# Patient Record
Sex: FEMALE | Race: WHITE | NOT HISPANIC OR LATINO | URBAN - METROPOLITAN AREA
[De-identification: names, ages, dates, MRNs, and addresses within clinical notes are randomized per-mention and may not be internally consistent; named-entity substitution may affect disease eponyms.]

---

## 2020-01-21 ENCOUNTER — INPATIENT (INPATIENT)
Facility: HOSPITAL | Age: 85
LOS: 4 days | Discharge: HOME | End: 2020-01-26
Attending: STUDENT IN AN ORGANIZED HEALTH CARE EDUCATION/TRAINING PROGRAM | Admitting: STUDENT IN AN ORGANIZED HEALTH CARE EDUCATION/TRAINING PROGRAM
Payer: COMMERCIAL

## 2020-01-21 VITALS
DIASTOLIC BLOOD PRESSURE: 67 MMHG | RESPIRATION RATE: 20 BRPM | OXYGEN SATURATION: 98 % | HEART RATE: 68 BPM | TEMPERATURE: 99 F | SYSTOLIC BLOOD PRESSURE: 122 MMHG

## 2020-01-21 DIAGNOSIS — Z29.9 ENCOUNTER FOR PROPHYLACTIC MEASURES, UNSPECIFIED: ICD-10-CM

## 2020-01-21 DIAGNOSIS — R41.82 ALTERED MENTAL STATUS, UNSPECIFIED: ICD-10-CM

## 2020-01-21 DIAGNOSIS — Z90.49 ACQUIRED ABSENCE OF OTHER SPECIFIED PARTS OF DIGESTIVE TRACT: Chronic | ICD-10-CM

## 2020-01-21 DIAGNOSIS — J18.9 PNEUMONIA, UNSPECIFIED ORGANISM: ICD-10-CM

## 2020-01-21 DIAGNOSIS — N30.90 CYSTITIS, UNSPECIFIED WITHOUT HEMATURIA: ICD-10-CM

## 2020-01-21 DIAGNOSIS — E87.1 HYPO-OSMOLALITY AND HYPONATREMIA: ICD-10-CM

## 2020-01-21 DIAGNOSIS — Z02.9 ENCOUNTER FOR ADMINISTRATIVE EXAMINATIONS, UNSPECIFIED: ICD-10-CM

## 2020-01-21 LAB
ALBUMIN SERPL ELPH-MCNC: 3.6 G/DL — SIGNIFICANT CHANGE UP (ref 3.3–5)
ALP SERPL-CCNC: 96 U/L — SIGNIFICANT CHANGE UP (ref 40–120)
ALT FLD-CCNC: 20 U/L — SIGNIFICANT CHANGE UP (ref 4–33)
ANION GAP SERPL CALC-SCNC: 13 MMO/L — SIGNIFICANT CHANGE UP (ref 7–14)
ANION GAP SERPL CALC-SCNC: 7 MMO/L — SIGNIFICANT CHANGE UP (ref 7–14)
APPEARANCE UR: CLEAR — SIGNIFICANT CHANGE UP
APTT BLD: 28.7 SEC — SIGNIFICANT CHANGE UP (ref 27.5–36.3)
AST SERPL-CCNC: 49 U/L — HIGH (ref 4–32)
B PERT DNA SPEC QL NAA+PROBE: NOT DETECTED — SIGNIFICANT CHANGE UP
BACTERIA # UR AUTO: SIGNIFICANT CHANGE UP
BASE EXCESS BLDV CALC-SCNC: 4.8 MMOL/L — SIGNIFICANT CHANGE UP
BASOPHILS # BLD AUTO: 0.03 K/UL — SIGNIFICANT CHANGE UP (ref 0–0.2)
BASOPHILS NFR BLD AUTO: 0.3 % — SIGNIFICANT CHANGE UP (ref 0–2)
BILIRUB SERPL-MCNC: 0.6 MG/DL — SIGNIFICANT CHANGE UP (ref 0.2–1.2)
BILIRUB UR-MCNC: NEGATIVE — SIGNIFICANT CHANGE UP
BLOOD GAS VENOUS - CREATININE: 0.62 MG/DL — SIGNIFICANT CHANGE UP (ref 0.5–1.3)
BLOOD UR QL VISUAL: NEGATIVE — SIGNIFICANT CHANGE UP
BUN SERPL-MCNC: 10 MG/DL — SIGNIFICANT CHANGE UP (ref 7–23)
BUN SERPL-MCNC: 11 MG/DL — SIGNIFICANT CHANGE UP (ref 7–23)
C PNEUM DNA SPEC QL NAA+PROBE: NOT DETECTED — SIGNIFICANT CHANGE UP
CALCIUM SERPL-MCNC: 8.8 MG/DL — SIGNIFICANT CHANGE UP (ref 8.4–10.5)
CALCIUM SERPL-MCNC: 9.2 MG/DL — SIGNIFICANT CHANGE UP (ref 8.4–10.5)
CHLORIDE BLDV-SCNC: 98 MMOL/L — SIGNIFICANT CHANGE UP (ref 96–108)
CHLORIDE SERPL-SCNC: 91 MMOL/L — LOW (ref 98–107)
CHLORIDE SERPL-SCNC: 91 MMOL/L — LOW (ref 98–107)
CO2 SERPL-SCNC: 23 MMOL/L — SIGNIFICANT CHANGE UP (ref 22–31)
CO2 SERPL-SCNC: 27 MMOL/L — SIGNIFICANT CHANGE UP (ref 22–31)
COLOR SPEC: YELLOW — SIGNIFICANT CHANGE UP
CREAT SERPL-MCNC: 0.58 MG/DL — SIGNIFICANT CHANGE UP (ref 0.5–1.3)
CREAT SERPL-MCNC: 0.6 MG/DL — SIGNIFICANT CHANGE UP (ref 0.5–1.3)
EOSINOPHIL # BLD AUTO: 0.04 K/UL — SIGNIFICANT CHANGE UP (ref 0–0.5)
EOSINOPHIL NFR BLD AUTO: 0.4 % — SIGNIFICANT CHANGE UP (ref 0–6)
FLUAV H1 2009 PAND RNA SPEC QL NAA+PROBE: NOT DETECTED — SIGNIFICANT CHANGE UP
FLUAV H1 RNA SPEC QL NAA+PROBE: NOT DETECTED — SIGNIFICANT CHANGE UP
FLUAV H3 RNA SPEC QL NAA+PROBE: NOT DETECTED — SIGNIFICANT CHANGE UP
FLUAV SUBTYP SPEC NAA+PROBE: NOT DETECTED — SIGNIFICANT CHANGE UP
FLUBV RNA SPEC QL NAA+PROBE: NOT DETECTED — SIGNIFICANT CHANGE UP
GAS PNL BLDV: 126 MMOL/L — LOW (ref 136–146)
GLUCOSE BLDV-MCNC: 97 MG/DL — SIGNIFICANT CHANGE UP (ref 70–99)
GLUCOSE SERPL-MCNC: 163 MG/DL — HIGH (ref 70–99)
GLUCOSE SERPL-MCNC: 96 MG/DL — SIGNIFICANT CHANGE UP (ref 70–99)
GLUCOSE UR-MCNC: NEGATIVE — SIGNIFICANT CHANGE UP
HADV DNA SPEC QL NAA+PROBE: NOT DETECTED — SIGNIFICANT CHANGE UP
HBA1C BLD-MCNC: 5.4 % — SIGNIFICANT CHANGE UP (ref 4–5.6)
HCO3 BLDV-SCNC: 28 MMOL/L — HIGH (ref 20–27)
HCOV PNL SPEC NAA+PROBE: SIGNIFICANT CHANGE UP
HCT VFR BLD CALC: 41.2 % — SIGNIFICANT CHANGE UP (ref 34.5–45)
HCT VFR BLDV CALC: 42.7 % — SIGNIFICANT CHANGE UP (ref 34.5–45)
HGB BLD-MCNC: 13.8 G/DL — SIGNIFICANT CHANGE UP (ref 11.5–15.5)
HGB BLDV-MCNC: 13.9 G/DL — SIGNIFICANT CHANGE UP (ref 11.5–15.5)
HMPV RNA SPEC QL NAA+PROBE: NOT DETECTED — SIGNIFICANT CHANGE UP
HPIV1 RNA SPEC QL NAA+PROBE: NOT DETECTED — SIGNIFICANT CHANGE UP
HPIV2 RNA SPEC QL NAA+PROBE: NOT DETECTED — SIGNIFICANT CHANGE UP
HPIV3 RNA SPEC QL NAA+PROBE: NOT DETECTED — SIGNIFICANT CHANGE UP
HPIV4 RNA SPEC QL NAA+PROBE: NOT DETECTED — SIGNIFICANT CHANGE UP
HYALINE CASTS # UR AUTO: SIGNIFICANT CHANGE UP
IMM GRANULOCYTES NFR BLD AUTO: 0.4 % — SIGNIFICANT CHANGE UP (ref 0–1.5)
INR BLD: 0.97 — SIGNIFICANT CHANGE UP (ref 0.88–1.17)
KETONES UR-MCNC: NEGATIVE — SIGNIFICANT CHANGE UP
LACTATE BLDV-MCNC: 1 MMOL/L — SIGNIFICANT CHANGE UP (ref 0.5–2)
LEUKOCYTE ESTERASE UR-ACNC: SIGNIFICANT CHANGE UP
LYMPHOCYTES # BLD AUTO: 1.37 K/UL — SIGNIFICANT CHANGE UP (ref 1–3.3)
LYMPHOCYTES # BLD AUTO: 12.5 % — LOW (ref 13–44)
MCHC RBC-ENTMCNC: 28.9 PG — SIGNIFICANT CHANGE UP (ref 27–34)
MCHC RBC-ENTMCNC: 33.5 % — SIGNIFICANT CHANGE UP (ref 32–36)
MCV RBC AUTO: 86.2 FL — SIGNIFICANT CHANGE UP (ref 80–100)
MONOCYTES # BLD AUTO: 0.71 K/UL — SIGNIFICANT CHANGE UP (ref 0–0.9)
MONOCYTES NFR BLD AUTO: 6.5 % — SIGNIFICANT CHANGE UP (ref 2–14)
NEUTROPHILS # BLD AUTO: 8.77 K/UL — HIGH (ref 1.8–7.4)
NEUTROPHILS NFR BLD AUTO: 79.9 % — HIGH (ref 43–77)
NITRITE UR-MCNC: NEGATIVE — SIGNIFICANT CHANGE UP
NRBC # FLD: 0 K/UL — SIGNIFICANT CHANGE UP (ref 0–0)
OSMOLALITY SERPL: 277 MOSMO/KG — SIGNIFICANT CHANGE UP (ref 275–295)
OSMOLALITY UR: 381 MOSMO/KG — SIGNIFICANT CHANGE UP (ref 50–1200)
PCO2 BLDV: 48 MMHG — SIGNIFICANT CHANGE UP (ref 41–51)
PH BLDV: 7.41 PH — SIGNIFICANT CHANGE UP (ref 7.32–7.43)
PH UR: 7 — SIGNIFICANT CHANGE UP (ref 5–8)
PLATELET # BLD AUTO: 247 K/UL — SIGNIFICANT CHANGE UP (ref 150–400)
PMV BLD: 10.5 FL — SIGNIFICANT CHANGE UP (ref 7–13)
PO2 BLDV: 52 MMHG — HIGH (ref 35–40)
POTASSIUM BLDV-SCNC: 4.3 MMOL/L — SIGNIFICANT CHANGE UP (ref 3.4–4.5)
POTASSIUM SERPL-MCNC: 4.4 MMOL/L — SIGNIFICANT CHANGE UP (ref 3.5–5.3)
POTASSIUM SERPL-MCNC: SIGNIFICANT CHANGE UP MMOL/L (ref 3.5–5.3)
POTASSIUM SERPL-SCNC: 4.4 MMOL/L — SIGNIFICANT CHANGE UP (ref 3.5–5.3)
POTASSIUM SERPL-SCNC: SIGNIFICANT CHANGE UP MMOL/L (ref 3.5–5.3)
PROT SERPL-MCNC: 7.4 G/DL — SIGNIFICANT CHANGE UP (ref 6–8.3)
PROT UR-MCNC: 70 — SIGNIFICANT CHANGE UP
PROTHROM AB SERPL-ACNC: 11.1 SEC — SIGNIFICANT CHANGE UP (ref 9.8–13.1)
RBC # BLD: 4.78 M/UL — SIGNIFICANT CHANGE UP (ref 3.8–5.2)
RBC # FLD: 14.7 % — HIGH (ref 10.3–14.5)
RBC CASTS # UR COMP ASSIST: SIGNIFICANT CHANGE UP (ref 0–?)
RSV RNA SPEC QL NAA+PROBE: NOT DETECTED — SIGNIFICANT CHANGE UP
RV+EV RNA SPEC QL NAA+PROBE: NOT DETECTED — SIGNIFICANT CHANGE UP
SAO2 % BLDV: 85.2 % — HIGH (ref 60–85)
SODIUM SERPL-SCNC: 125 MMOL/L — LOW (ref 135–145)
SODIUM SERPL-SCNC: 127 MMOL/L — LOW (ref 135–145)
SODIUM UR-SCNC: 34 MMOL/L — SIGNIFICANT CHANGE UP
SP GR SPEC: 1.04 — SIGNIFICANT CHANGE UP (ref 1–1.04)
SQUAMOUS # UR AUTO: SIGNIFICANT CHANGE UP
TSH SERPL-MCNC: 1.46 UIU/ML — SIGNIFICANT CHANGE UP (ref 0.27–4.2)
UROBILINOGEN FLD QL: NORMAL — SIGNIFICANT CHANGE UP
WBC # BLD: 10.96 K/UL — HIGH (ref 3.8–10.5)
WBC # FLD AUTO: 10.96 K/UL — HIGH (ref 3.8–10.5)
WBC UR QL: >50 — HIGH (ref 0–?)

## 2020-01-21 PROCEDURE — 99223 1ST HOSP IP/OBS HIGH 75: CPT | Mod: GC

## 2020-01-21 PROCEDURE — 71045 X-RAY EXAM CHEST 1 VIEW: CPT | Mod: 26

## 2020-01-21 PROCEDURE — 70496 CT ANGIOGRAPHY HEAD: CPT | Mod: 26

## 2020-01-21 PROCEDURE — 70498 CT ANGIOGRAPHY NECK: CPT | Mod: 26

## 2020-01-21 RX ORDER — IPRATROPIUM/ALBUTEROL SULFATE 18-103MCG
3 AEROSOL WITH ADAPTER (GRAM) INHALATION EVERY 4 HOURS
Refills: 0 | Status: DISCONTINUED | OUTPATIENT
Start: 2020-01-21 | End: 2020-01-26

## 2020-01-21 RX ORDER — SODIUM CHLORIDE 9 MG/ML
1000 INJECTION, SOLUTION INTRAVENOUS
Refills: 0 | Status: DISCONTINUED | OUTPATIENT
Start: 2020-01-21 | End: 2020-01-21

## 2020-01-21 RX ORDER — ACETAMINOPHEN 500 MG
650 TABLET ORAL EVERY 6 HOURS
Refills: 0 | Status: DISCONTINUED | OUTPATIENT
Start: 2020-01-21 | End: 2020-01-26

## 2020-01-21 RX ORDER — AZITHROMYCIN 500 MG/1
500 TABLET, FILM COATED ORAL ONCE
Refills: 0 | Status: COMPLETED | OUTPATIENT
Start: 2020-01-21 | End: 2020-01-21

## 2020-01-21 RX ORDER — SODIUM CHLORIDE 9 MG/ML
1000 INJECTION INTRAMUSCULAR; INTRAVENOUS; SUBCUTANEOUS ONCE
Refills: 0 | Status: COMPLETED | OUTPATIENT
Start: 2020-01-21 | End: 2020-01-21

## 2020-01-21 RX ORDER — SODIUM CHLORIDE 9 MG/ML
1000 INJECTION INTRAMUSCULAR; INTRAVENOUS; SUBCUTANEOUS
Refills: 0 | Status: DISCONTINUED | OUTPATIENT
Start: 2020-01-21 | End: 2020-01-22

## 2020-01-21 RX ORDER — CEFTRIAXONE 500 MG/1
1000 INJECTION, POWDER, FOR SOLUTION INTRAMUSCULAR; INTRAVENOUS ONCE
Refills: 0 | Status: COMPLETED | OUTPATIENT
Start: 2020-01-21 | End: 2020-01-21

## 2020-01-21 RX ORDER — HEPARIN SODIUM 5000 [USP'U]/ML
5000 INJECTION INTRAVENOUS; SUBCUTANEOUS EVERY 8 HOURS
Refills: 0 | Status: DISCONTINUED | OUTPATIENT
Start: 2020-01-21 | End: 2020-01-26

## 2020-01-21 RX ADMIN — SODIUM CHLORIDE 1000 MILLILITER(S): 9 INJECTION INTRAMUSCULAR; INTRAVENOUS; SUBCUTANEOUS at 17:41

## 2020-01-21 RX ADMIN — CEFTRIAXONE 100 MILLIGRAM(S): 500 INJECTION, POWDER, FOR SOLUTION INTRAMUSCULAR; INTRAVENOUS at 19:58

## 2020-01-21 RX ADMIN — AZITHROMYCIN 255 MILLIGRAM(S): 500 TABLET, FILM COATED ORAL at 20:06

## 2020-01-21 NOTE — ED PROVIDER NOTE - PROGRESS NOTE DETAILS
phone contact number for daughter in law (allison) and son (chris) as they are traveling out of state/ returning [facility]: [phone] Davey Arenas PGY2  Spoke to son Jarred (who is HCP), who confirms that she is DNR/DNI Davey Arenas PGY2  cxr shows pna, sodium 125. home aide endorses patient drinking a lot of water yesterday. admitted to medicine

## 2020-01-21 NOTE — H&P ADULT - PROBLEM/PLAN-2
Telephone Encounter by Cheryl Pacheco RN at 06/06/17 10:25 AM     Author:  Cheryl Pacheco RN Service:  (none) Author Type:  Registered Nurse     Filed:  06/06/17 10:32 AM Encounter Date:  6/2/2017 Status:  Addendum     :  Cheryl Pacheco RN (Registered Nurse)            Patient was seen in the ER on 06/01 and also saw Dr. Watson on 06/02/17.  Patient states, \"I'm still dizzy, nauseated and weak and on my last day of antibiotics.\"  \"I'm only feeling slightly better\"  Dr. Watson increased her Amlodipine 5 mg one in the am and one in the pm and Metoprolol 25 mg because my blood pressure was 179/92 in the ER.\"  Advised patient to keep her appointment today with Dr. Abel.[VP1.1M]    3-way repeat back was completed on the information provided by the patient for verification and accuracy. Patient was in agreement and denied further questions or concerns.[VP1.2T]      Revision History        User Key Date/Time User Provider Type Action    > [N/A] 06/06/17 10:32 AM Cheryl Pacheco RN Registered Nurse Addend     VP1.2 06/06/17 10:31 AM Cheryl Pacheco RN Registered Nurse Sign     VP1.1 06/06/17 10:25 AM Cheryl Pacheco RN Registered Nurse     M - Manual, T - Template             DISPLAY PLAN FREE TEXT

## 2020-01-21 NOTE — H&P ADULT - NSHPLABSRESULTS_GEN_ALL_CORE
13.8   10.96 )-----------( 247      ( 2020 16:46 )             41.2         125<L>  |  91<L>  |  11  ----------------------------<  96  Test not performed SPECIMEN SEVERELY HEMOLYZED   |  27  |  0.58    Ca    9.2      2020 16:46    TPro  7.4  /  Alb  3.6  /  TBili  0.6  /  DBili  x   /  AST  49<H>  /  ALT  20  /  AlkPhos  96      PT/INR - ( 2020 16:46 )   PT: 11.1 SEC;   INR: 0.97          PTT - ( 2020 16:46 )  PTT:28.7 SEC      Urinalysis Basic - ( 2020 17:36 )    Color: YELLOW / Appearance: CLEAR / S.040 / pH: 7.0  Gluc: NEGATIVE / Ketone: NEGATIVE  / Bili: NEGATIVE / Urobili: NORMAL   Blood: NEGATIVE / Protein: 70 / Nitrite: NEGATIVE   Leuk Esterase: LARGE / RBC: 0-2 / WBC >50   Sq Epi: OCC / Non Sq Epi: x / Bacteria: FEW    < from: CT Angio Neck w/ IV Cont (20 @ 17:20) >      COMPARISON:  Brain CT 3/23/2016    FINDINGS: The exam is significantly degraded by motion artifact.  Neck CTA:    The visualized aorta and great vessels are unremarkable. The common carotid arteries appear patent.   The internal carotid arteries and external carotid arteries appear patent.   The visualized portions of the vertebral arteries are patent.    Brain CTA:    The distal internal carotid arteries are not completely visualized  The [place] of Blood is notwell seen.   The cerebral arteries are not well seen.   The vertebrobasilar junction and basilar artery appear patent.    All measurements are performed using standard NASCET criteria.    CT of the head demonstrates the ventricles and sulci to be prominent in size compatible with age-appropriate volume loss. Patchy areas of white matter hypodensity are compatible with moderate microvascular-type changes. No mass effect or hemorrhage is seen. No extra-axial collection is seen.    IMPRESSION: Limited exam due to motion artifact.    No significant stenosis of the extracranial carotid arteries or vertebral arteries identified.    Evaluation of the intracranial circulation is significantly limited.    < end of copied text >    < from: Xray Chest 1 View- PORTABLE-Urgent (20 @ 16:52) >    INTERPRETATION:  Hazyright mid/upper lung opacity compatible with pneumonia    < end of copied text >          RADIOLOGY & ADDITIONAL TESTS:  Results Reviewed:   Imaging Personally Reviewed:  Electrocardiogram Personally Reviewed:    COORDINATION OF CARE:  Care Discussed with Consultants/Other Providers [Y/N]:  Prior or Outpatient Records Reviewed [Y/N]: 13.8   10.96 )-----------( 247      ( 2020 16:46 )             41.2         125<L>  |  91<L>  |  11  ----------------------------<  96  Test not performed SPECIMEN SEVERELY HEMOLYZED   |  27  |  0.58    Ca    9.2      2020 16:46    TPro  7.4  /  Alb  3.6  /  TBili  0.6  /  DBili  x   /  AST  49<H>  /  ALT  20  /  AlkPhos  96      PT/INR - ( 2020 16:46 )   PT: 11.1 SEC;   INR: 0.97          PTT - ( 2020 16:46 )  PTT:28.7 SEC      Urinalysis Basic - ( 2020 17:36 )    Color: YELLOW / Appearance: CLEAR / S.040 / pH: 7.0  Gluc: NEGATIVE / Ketone: NEGATIVE  / Bili: NEGATIVE / Urobili: NORMAL   Blood: NEGATIVE / Protein: 70 / Nitrite: NEGATIVE   Leuk Esterase: LARGE / RBC: 0-2 / WBC >50   Sq Epi: OCC / Non Sq Epi: x / Bacteria: FEW    < from: CT Angio Neck w/ IV Cont (20 @ 17:20) >      COMPARISON:  Brain CT 3/23/2016    FINDINGS: The exam is significantly degraded by motion artifact.  Neck CTA:    The visualized aorta and great vessels are unremarkable. The common carotid arteries appear patent.   The internal carotid arteries and external carotid arteries appear patent.   The visualized portions of the vertebral arteries are patent.    Brain CTA:    The distal internal carotid arteries are not completely visualized  The [place] of Blood is notwell seen.   The cerebral arteries are not well seen.   The vertebrobasilar junction and basilar artery appear patent.    All measurements are performed using standard NASCET criteria.    CT of the head demonstrates the ventricles and sulci to be prominent in size compatible with age-appropriate volume loss. Patchy areas of white matter hypodensity are compatible with moderate microvascular-type changes. No mass effect or hemorrhage is seen. No extra-axial collection is seen.    IMPRESSION: Limited exam due to motion artifact.    No significant stenosis of the extracranial carotid arteries or vertebral arteries identified.    Evaluation of the intracranial circulation is significantly limited.    < end of copied text >    < from: Xray Chest 1 View- PORTABLE-Urgent (20 @ 16:52) >    INTERPRETATION:  Hazyright mid/upper lung opacity compatible with pneumonia    < end of copied text >          RADIOLOGY & ADDITIONAL TESTS:  Results Reviewed: yes  Imaging Personally Reviewed: yes   Electrocardiogram Personally Reviewed: yes; normal EKG, normal rate, normal intervals, no SHELLIE/STD or TWI     COORDINATION OF CARE:  Care Discussed with Consultants/Other Providers [ Y/N]:  Prior or Outpatient Records Reviewed [Y/N]: Y; no PCP

## 2020-01-21 NOTE — ED PROVIDER NOTE - PHYSICAL EXAMINATION
Get adequate rest  Increase fluid intake  Gargle with warm water and salt solution several times a day as needed for sore throat or postnasal drainage  May take Children's Tylenol every 6 hours as needed for pain or fever           VS:  unremarkable except LG temp, tachypnea    GEN - malaise, confused, slurred speech.  No resp distress  HEAD - NC/AT     ENT - PEERL, EOMI, mucous membranes   dry, no discharge      NECK: Neck supple, non-tender without lymphadenopathy, no masses, no JVD  PULM - CTA b/l,  symmetric breath sounds  COR -  normal heart sounds    ABD - , obese, NT, soft,  BACK - no CVA tenderness, nontender spine     EXTREMS - no edema, no deformity, warm and well perfused.  bilat calves atrophic   SKIN - no rash    or bruising    Mild redness bilat lower legs, no tenderness (h/o recurrent cellulitis)  NEUROLOGIC - alert, face symmetric, speech slurred, sensation nl, motor bilat UE normal strength, bilat LE can move toes, can not lift either leg off bed (chronic to some degree but worse).

## 2020-01-21 NOTE — H&P ADULT - NSHPSOCIALHISTORY_GEN_ALL_CORE
never smoke, drank or did illicit drugs  wheelchair bound after a fall 4 years ago  Lives w/ son and his family and has a HHA   Son Jarred is the HCP   DNR/DNI

## 2020-01-21 NOTE — ED PROVIDER NOTE - ATTENDING CONTRIBUTION TO CARE
89F pmhx none except wheelchair bound at baseline, AMS x 1 day, usu AAO x 3, today unable to make words, unable to interact.  Able to move arms and legs, nonsensical; no fever at home or cough; Family out of country, no collateral availabe, pt with an aide, opening eyes.  Legs appear atrophic distally c/w nonweight bearing, no edema/rash, throat looks OK.  LG temp here check Rectal temp, check FS.  Eval for ICH/stroke/mass, rx fluids, check str cath UA/Cx; afebrile rectally, concern for stroke given dysarthria and aphasia.  Moving both arms, both legs don't move well but this is chronic they don't appear infected (chronic problem).  Plan check labs, CT, neuro consult, admit 89F pmhx none except wheelchair bound at baseline, AMS x 1 day, usu AAO x 3, today unable to make words, unable to interact.  Able to move arms and legs, nonsensical; no fever at home or cough; Family out of country, no collateral availabe, pt with an aide, opening eyes.  Legs appear atrophic distally c/w nonweight bearing, no edema/rash, throat looks OK.  LG temp here check Rectal temp, check FS.  Eval for ICH/stroke/mass, rx fluids, check str cath UA/Cx; afebrile rectally, concern for stroke given dysarthria and aphasia.  Moving both arms, both legs don't move well but this is chronic they don't appear infected (chronic problem).  Plan check labs, CT, neuro consult, admit.  VS:  unremarkable except LG temp, tachypnea    GEN - malaise, confused, slurred speech.  No resp distress  HEAD - NC/AT     ENT - PEERL, EOMI, mucous membranes   dry, no discharge      NECK: Neck supple, non-tender without lymphadenopathy, no masses, no JVD  PULM - CTA b/l,  symmetric breath sounds  COR -  normal heart sounds    ABD - , obese, NT, soft,  BACK - no CVA tenderness, nontender spine     EXTREMS - no edema, no deformity, warm and well perfused.  bilat calves atrophic   SKIN - no rash    or bruising    Mild redness bilat lower legs, no tenderness (h/o recurrent cellulitis)  NEUROLOGIC - alert, face symmetric, speech slurred, sensation nl, motor bilat UE normal strength, bilat LE can move toes, can not lift either leg off bed (chronic to some degree but worse).

## 2020-01-21 NOTE — H&P ADULT - NSHPPHYSICALEXAM_GEN_ALL_CORE
Vital Signs Last 24 Hrs  T(C): 37.3 (21 Jan 2020 15:30), Max: 37.3 (21 Jan 2020 13:53)  T(F): 99.1 (21 Jan 2020 15:30), Max: 99.1 (21 Jan 2020 13:53)  HR: 72 (21 Jan 2020 15:30) (68 - 72)  BP: 131/66 (21 Jan 2020 15:30) (122/67 - 131/66)  BP(mean): --  RR: 20 (21 Jan 2020 15:30) (20 - 20)  SpO2: 98% (21 Jan 2020 15:30) (98% - 98%)    PHYSICAL EXAM:  GENERAL: NAD, well-groomed, well-developed  HEAD:  Atraumatic, Normocephalic  EYES: EOMI, PERRLA, conjunctiva and sclera clear  ENMT: No tonsillar erythema, exudates, or enlargement; Moist mucous membranes, Good dentition, No lesions  NECK: Supple, No JVD, Normal thyroid  CHEST/LUNG: Clear to ausculation bilaterally; No rales, rhonchi, wheezing, or rubs  HEART: Regular rate and rhythm; No murmurs, rubs, or gallops  ABDOMEN: Soft, Nontender, Nondistended; Bowel sounds present  EXTREMITIES:  2+ Peripheral Pulses, No clubbing, cyanosis, or edema  LYMPH: No lymphadenopathy noted  SKIN: No rashes or lesions  NERVOUS SYSTEM:  Alert & Oriented X3, Good concentration; Motor Strength 5/5 B/L upper and lower extremities; DTRs 2+ intact and symmetric Vital Signs Last 24 Hrs  T(C): 37.3 (21 Jan 2020 15:30), Max: 37.3 (21 Jan 2020 13:53)  T(F): 99.1 (21 Jan 2020 15:30), Max: 99.1 (21 Jan 2020 13:53)  HR: 72 (21 Jan 2020 15:30) (68 - 72)  BP: 131/66 (21 Jan 2020 15:30) (122/67 - 131/66)  BP(mean): --  RR: 20 (21 Jan 2020 15:30) (20 - 20)  SpO2: 98% (21 Jan 2020 15:30) (98% - 98%)    PHYSICAL EXAM:  GENERAL: altered and moving thrashing around   HEAD:  Atraumatic, Normocephalic  EYES: EOMI, PERRLA, conjunctiva and sclera clear  ENMT: had venti-mask on   CHEST/LUNG: Clear to ausculation anteriorly; no rhonchi or wheezing  HEART: Regular rate and rhythm; No murmurs, rubs, or gallops  ABDOMEN: Soft, possible ventral hernia?; no bowel sounds present  EXTREMITIES:  2+ Peripheral Pulses, No clubbing, cyanosis, or edema.  Atrophied lower legs   LYMPH: unable to have patient sit still for exam   SKIN: No rashes or lesions  NERVOUS SYSTEM:  Alert & Oriented X 0; unable to participate in neuro exam

## 2020-01-21 NOTE — H&P ADULT - PROBLEM SELECTOR PLAN 6
Transitions of Care Status:  1.  Name of PCP: no PCP  2.  PCP Contacted on Admission: [ ] Y    [ x] N    3.  PCP contacted at Discharge: [ ] Y    [ ] N    [ ] N/A  4.  Post-Discharge Appointment Date and Location:  5.  Summary of Handoff given to PCP: Transitions of Care Status:  1.  Name of PCP: no PCP  2.  PCP Contacted on Admission: [ ] Y    [ x] N    3.  PCP contacted at Discharge: [ ] Y    [ ] N    [ ] N/A  4.  Post-Discharge Appointment Date and Location:  5.  Summary of Handoff given to PCP:      Dia Rich MD  Internal Medicine, PGY-2  [phone]

## 2020-01-21 NOTE — ED PROVIDER NOTE - OBJECTIVE STATEMENT
89 yo female with unremarkable pmhx presenting with mental status changes over the past day. Per home aide, patient was noted to be communicating less since this morning, not fully responding verbally to commands, (her baseline is AOx3). Aide and family states that this is the first occurrence of these symptoms. Brought to ED for further evaluation. Patient is wheelchair bound at baseline.    Aide denies fevers, sick contacts, recent travel, cough, N/V      phone contact number for daughter in law (allison) and son (jarred) as they are traveling out of state/ returning tonight: [phone]  Son Jarred the HCP confirms patient is DNR DNI

## 2020-01-21 NOTE — H&P ADULT - PROBLEM SELECTOR PLAN 4
- likely in setting of poor PO intake   - received 1L NS bolus on arrival   - f/u repeat BMP  - f/u hyponatremia studies

## 2020-01-21 NOTE — H&P ADULT - PROBLEM SELECTOR PLAN 5
- DVT ppx: subq heparin  - Diet: NPO until mental status clears w/ maintenance fluids at 75cc/hr - DVT ppx: subq heparin  - Diet: NPO until mental status clears.  Ordered official S&S consult.

## 2020-01-21 NOTE — H&P ADULT - HISTORY OF PRESENT ILLNESS
89 yo F who hasn't seen a doctor in years and is on only baby ASA at the recommendation of her daughter in law is presenting from home w/ AMS x 1 day.  At baseline pt is wheelchair bound and AAO x 3; 4 years ago she had a fall and has been wheelchair bound since and has been living w/ her son and his family.  They have a HHA that noticed that pt was communicating less since this morning and not fully responding verbally to commands. Aide and family states that this is the first occurrence of these symptoms. Brought to ED for further evaluation.

## 2020-01-21 NOTE — ED ADULT NURSE NOTE - OBJECTIVE STATEMENT
Break Shift RN: Pt received to spot 20 presents with altered mental status that began last night. Pt a&ox3 at baseline, however currently is a&ox0. Pt skin intact, respirations even and unlabored, abd soft and non-distended, nontender to palpation, pt bedbound at baseline. Pt aide at bedside acting as historian. Pt arrives with 20g in rt arm placed by EMS. 2nd IV placed in left arm, labs drawn and sent, pt currently at CT scan. Will continue to monitor.

## 2020-01-21 NOTE — H&P ADULT - NSHPREVIEWOFSYSTEMS_GEN_ALL_CORE
CONSTITUTIONAL: No weakness, fevers or chills  EYES: No visual changes; No blurry vision  ENT:  No pain or stiffness; No vertigo or throat pain  RESPIRATORY: No cough, wheezing, hemoptysis; No shortness of breath  CARDIOVASCULAR: No chest pain or palpitations  GASTROINTESTINAL: No abdominal or epigastric pain. No nausea, vomiting, or hematemesis; No diarrhea or constipation. No melena or hematochezia.  GENITOURINARY: No dysuria, frequency or hematuria  NEUROLOGICAL: No numbness, No HA  SKIN: No itching, rashes  MSK: no joint pain, no muscle pain unable to obtain 2/2 mental status

## 2020-01-21 NOTE — H&P ADULT - PROBLEM SELECTOR PLAN 1
- likely 2/2 metabolic encephalopathy from infectious etiology; CXR w/ hazy RUL and RML opacity and U/A positive for UTI.  F/U urine legionella, urine culture and blood cultures; if possible can try to obtain a sputum culture when pt more stable   - f/u TSH, b12, folate, RPR screen   - CTA head and neck showed patent vessels but was limited from motion artifact

## 2020-01-21 NOTE — ED PROVIDER NOTE - CARE PLAN
Principal Discharge DX:	Altered mental status  Secondary Diagnosis:	Pneumonia  Secondary Diagnosis:	Hyponatremia

## 2020-01-21 NOTE — H&P ADULT - PROBLEM SELECTOR PLAN 2
- CXR w/ RUL and RML opacity likely a PNA.  No hospitalizations in the last 3 months  - c/w 500 IV azithro x 3 days; can d/c if urine legionella is negative  - c/w ceftriaxone x 5 - 7 days   - RVP negative

## 2020-01-21 NOTE — ED PROVIDER NOTE - CLINICAL SUMMARY MEDICAL DECISION MAKING FREE TEXT BOX
89 yo female with unremarkable pmhx presenting with AMS. will evaluate for neuro, infectious, metabolic etiology with cbc cmp urine studies, ct head, cxr, will give ivf and will reassess. likely admission for mental status changes

## 2020-01-21 NOTE — ED ADULT NURSE NOTE - NSIMPLEMENTINTERV_GEN_ALL_ED
Implemented All Fall Risk Interventions:  [place] to call system. Call bell, personal items and telephone within reach. Instruct patient to call for assistance. Room bathroom lighting operational. Non-slip footwear when patient is off stretcher. Physically safe environment: no spills, clutter or unnecessary equipment. Stretcher in lowest position, wheels locked, appropriate side rails in place. Provide visual cue, wrist band, yellow gown, etc. Monitor gait and stability. Monitor for mental status changes and reorient to person, place, and time. Review medications for side effects contributing to fall risk. Reinforce activity limits and safety measures with patient and family.

## 2020-01-21 NOTE — H&P ADULT - ASSESSMENT
89 yo F who hasn't seen a doctor in years and is on only baby ASA at the recommendation of her daughter in law presenting w/ AMS 2/2 metabolic encephalopathy likely from CAP and UTI

## 2020-01-21 NOTE — ED ADULT TRIAGE NOTE - CHIEF COMPLAINT QUOTE
Pt A+OX0 since last night.  Pt bedbound, normally A+OX3.  Refusing temp to EMS.  O2 2L NC in use.  O2 sat 92% RA.  #20g R FA.  Pt not following directions

## 2020-01-21 NOTE — H&P ADULT - ATTENDING COMMENTS
89 y/o female, NO PCP, on ASA at home for primary prevention, HX of Fall 4 years ago, wheelchair Bound, pt is A+O x 3 at baseline, pt lives with family and HHA, pt is admitted for Altered mental status x one day, No Fever, + UA, R/O UTI, Chest X ray possible Pneumonia, Hyponatremia , Na 125 improved to 127 with IVF NS x one Lit.  given by ER, RVP Neg., on O2 NC 2 Lit., TSH 1.46, pt is DNR/DNI, Lethargic , + Dehydration, NPO, DVT Prophylaxis: SQ Heparin , IV Zithromax, IV Ceftriaxone  Cultures, Vit B12, Folate, , F/U CBC, CMP, PT, PTT, INR, Cultures, Vit B12, Folate, Ferritin, Iron Studies, Speech and Swallow consult, Fall/aspiration precaution, IVF NS @ 75 cc/hr x 10 HR, FS Q 6 HR    , HgbA1c,   FS Q  6 HR,  IVF NS @ 75 cc/hr x 10  HR, Duoneb Q 4 HR PRN wheezing,     Case D/W HS, and Nursing,     Pt was seen by me    , Dr. JUANCARLOS Osorio on 1/21/2020. 89 y/o female, NO PCP, on ASA at home for primary prevention, HX of Fall 4 years ago, wheelchair Bound, pt is A+O x 3 at baseline, pt lives with family and HHA, pt is admitted for Altered mental status x one day, No Fever, + UA, R/O UTI, Chest X ray possible Pneumonia, Hyponatremia , Na 125 improved to 127 with IVF NS x one Lit.  given by ER, RVP Neg., on O2 NC 2 Lit., TSH 1.46, pt is DNR/DNI, Lethargic , + Dehydration, NPO, DVT Prophylaxis: SQ Heparin , IV Zithromax, IV Ceftriaxone  , F/U CBC, CMP, PT, PTT, INR, Cultures, Vit B12, Folate, Ferritin, Iron Studies, Speech and Swallow consult, Fall/aspiration precaution, IVF NS @ 75 cc/hr x 10 HR, FS Q 6 HR    , HgbA1c,   Duoneb Q 4 HR PRN wheezing,     Case D/W HS, and Nursing,     Pt was seen by me , Dr. JUANCARLOS Osorio on 1/21/2020.

## 2020-01-22 LAB
ALBUMIN SERPL ELPH-MCNC: 3 G/DL — LOW (ref 3.3–5)
ALP SERPL-CCNC: 90 U/L — SIGNIFICANT CHANGE UP (ref 40–120)
ALT FLD-CCNC: 14 U/L — SIGNIFICANT CHANGE UP (ref 4–33)
ANION GAP SERPL CALC-SCNC: 11 MMO/L — SIGNIFICANT CHANGE UP (ref 7–14)
AST SERPL-CCNC: 21 U/L — SIGNIFICANT CHANGE UP (ref 4–32)
BASOPHILS # BLD AUTO: 0.06 K/UL — SIGNIFICANT CHANGE UP (ref 0–0.2)
BASOPHILS NFR BLD AUTO: 0.8 % — SIGNIFICANT CHANGE UP (ref 0–2)
BILIRUB SERPL-MCNC: 0.5 MG/DL — SIGNIFICANT CHANGE UP (ref 0.2–1.2)
BUN SERPL-MCNC: 10 MG/DL — SIGNIFICANT CHANGE UP (ref 7–23)
CALCIUM SERPL-MCNC: 7.4 MG/DL — LOW (ref 8.4–10.5)
CHLORIDE SERPL-SCNC: 97 MMOL/L — LOW (ref 98–107)
CO2 SERPL-SCNC: 24 MMOL/L — SIGNIFICANT CHANGE UP (ref 22–31)
CREAT SERPL-MCNC: 0.71 MG/DL — SIGNIFICANT CHANGE UP (ref 0.5–1.3)
EOSINOPHIL # BLD AUTO: 0.12 K/UL — SIGNIFICANT CHANGE UP (ref 0–0.5)
EOSINOPHIL NFR BLD AUTO: 1.5 % — SIGNIFICANT CHANGE UP (ref 0–6)
FERRITIN SERPL-MCNC: 142 NG/ML — SIGNIFICANT CHANGE UP (ref 15–150)
GLUCOSE SERPL-MCNC: 84 MG/DL — SIGNIFICANT CHANGE UP (ref 70–99)
HCT VFR BLD CALC: 38.6 % — SIGNIFICANT CHANGE UP (ref 34.5–45)
HGB BLD-MCNC: 12.5 G/DL — SIGNIFICANT CHANGE UP (ref 11.5–15.5)
IMM GRANULOCYTES NFR BLD AUTO: 0.4 % — SIGNIFICANT CHANGE UP (ref 0–1.5)
IRON SATN MFR SERPL: 257 UG/DL — SIGNIFICANT CHANGE UP (ref 140–530)
IRON SATN MFR SERPL: 39 UG/DL — SIGNIFICANT CHANGE UP (ref 30–160)
L PNEUMO AG UR QL: NEGATIVE — SIGNIFICANT CHANGE UP
LYMPHOCYTES # BLD AUTO: 1.77 K/UL — SIGNIFICANT CHANGE UP (ref 1–3.3)
LYMPHOCYTES # BLD AUTO: 22.2 % — SIGNIFICANT CHANGE UP (ref 13–44)
MAGNESIUM SERPL-MCNC: 1.7 MG/DL — SIGNIFICANT CHANGE UP (ref 1.6–2.6)
MCHC RBC-ENTMCNC: 28.7 PG — SIGNIFICANT CHANGE UP (ref 27–34)
MCHC RBC-ENTMCNC: 32.4 % — SIGNIFICANT CHANGE UP (ref 32–36)
MCV RBC AUTO: 88.5 FL — SIGNIFICANT CHANGE UP (ref 80–100)
MONOCYTES # BLD AUTO: 0.84 K/UL — SIGNIFICANT CHANGE UP (ref 0–0.9)
MONOCYTES NFR BLD AUTO: 10.6 % — SIGNIFICANT CHANGE UP (ref 2–14)
NEUTROPHILS # BLD AUTO: 5.14 K/UL — SIGNIFICANT CHANGE UP (ref 1.8–7.4)
NEUTROPHILS NFR BLD AUTO: 64.5 % — SIGNIFICANT CHANGE UP (ref 43–77)
NRBC # FLD: 0 K/UL — SIGNIFICANT CHANGE UP (ref 0–0)
PHOSPHATE SERPL-MCNC: 4 MG/DL — SIGNIFICANT CHANGE UP (ref 2.5–4.5)
PLATELET # BLD AUTO: 202 K/UL — SIGNIFICANT CHANGE UP (ref 150–400)
PMV BLD: 10.4 FL — SIGNIFICANT CHANGE UP (ref 7–13)
POTASSIUM SERPL-MCNC: 4.6 MMOL/L — SIGNIFICANT CHANGE UP (ref 3.5–5.3)
POTASSIUM SERPL-SCNC: 4.6 MMOL/L — SIGNIFICANT CHANGE UP (ref 3.5–5.3)
PROT SERPL-MCNC: 6.1 G/DL — SIGNIFICANT CHANGE UP (ref 6–8.3)
RBC # BLD: 4.36 M/UL — SIGNIFICANT CHANGE UP (ref 3.8–5.2)
RBC # FLD: 15 % — HIGH (ref 10.3–14.5)
SODIUM SERPL-SCNC: 132 MMOL/L — LOW (ref 135–145)
SPECIMEN SOURCE: SIGNIFICANT CHANGE UP
UIBC SERPL-MCNC: 217.8 UG/DL — SIGNIFICANT CHANGE UP (ref 110–370)
WBC # BLD: 7.96 K/UL — SIGNIFICANT CHANGE UP (ref 3.8–10.5)
WBC # FLD AUTO: 7.96 K/UL — SIGNIFICANT CHANGE UP (ref 3.8–10.5)

## 2020-01-22 PROCEDURE — 99233 SBSQ HOSP IP/OBS HIGH 50: CPT | Mod: GC

## 2020-01-22 RX ORDER — SODIUM CHLORIDE 9 MG/ML
1000 INJECTION, SOLUTION INTRAVENOUS
Refills: 0 | Status: DISCONTINUED | OUTPATIENT
Start: 2020-01-22 | End: 2020-01-23

## 2020-01-22 RX ORDER — SODIUM CHLORIDE 9 MG/ML
1000 INJECTION INTRAMUSCULAR; INTRAVENOUS; SUBCUTANEOUS
Refills: 0 | Status: DISCONTINUED | OUTPATIENT
Start: 2020-01-22 | End: 2020-01-22

## 2020-01-22 RX ORDER — DEXTROSE 50 % IN WATER 50 %
12.5 SYRINGE (ML) INTRAVENOUS ONCE
Refills: 0 | Status: DISCONTINUED | OUTPATIENT
Start: 2020-01-22 | End: 2020-01-24

## 2020-01-22 RX ORDER — SODIUM CHLORIDE 9 MG/ML
1000 INJECTION, SOLUTION INTRAVENOUS
Refills: 0 | Status: DISCONTINUED | OUTPATIENT
Start: 2020-01-22 | End: 2020-01-24

## 2020-01-22 RX ORDER — CEFTRIAXONE 500 MG/1
1000 INJECTION, POWDER, FOR SOLUTION INTRAMUSCULAR; INTRAVENOUS EVERY 24 HOURS
Refills: 0 | Status: DISCONTINUED | OUTPATIENT
Start: 2020-01-22 | End: 2020-01-26

## 2020-01-22 RX ORDER — SODIUM CHLORIDE 9 MG/ML
1000 INJECTION, SOLUTION INTRAVENOUS
Refills: 0 | Status: DISCONTINUED | OUTPATIENT
Start: 2020-01-22 | End: 2020-01-22

## 2020-01-22 RX ORDER — GLUCAGON INJECTION, SOLUTION 0.5 MG/.1ML
1 INJECTION, SOLUTION SUBCUTANEOUS ONCE
Refills: 0 | Status: DISCONTINUED | OUTPATIENT
Start: 2020-01-22 | End: 2020-01-24

## 2020-01-22 RX ORDER — DEXTROSE 50 % IN WATER 50 %
50 SYRINGE (ML) INTRAVENOUS ONCE
Refills: 0 | Status: DISCONTINUED | OUTPATIENT
Start: 2020-01-22 | End: 2020-01-22

## 2020-01-22 RX ORDER — DEXTROSE 50 % IN WATER 50 %
15 SYRINGE (ML) INTRAVENOUS ONCE
Refills: 0 | Status: DISCONTINUED | OUTPATIENT
Start: 2020-01-22 | End: 2020-01-24

## 2020-01-22 RX ORDER — DEXTROSE 50 % IN WATER 50 %
25 SYRINGE (ML) INTRAVENOUS ONCE
Refills: 0 | Status: DISCONTINUED | OUTPATIENT
Start: 2020-01-22 | End: 2020-01-24

## 2020-01-22 RX ORDER — AZITHROMYCIN 500 MG/1
500 TABLET, FILM COATED ORAL EVERY 24 HOURS
Refills: 0 | Status: DISCONTINUED | OUTPATIENT
Start: 2020-01-22 | End: 2020-01-23

## 2020-01-22 RX ADMIN — SODIUM CHLORIDE 75 MILLILITER(S): 9 INJECTION INTRAMUSCULAR; INTRAVENOUS; SUBCUTANEOUS at 00:59

## 2020-01-22 RX ADMIN — HEPARIN SODIUM 5000 UNIT(S): 5000 INJECTION INTRAVENOUS; SUBCUTANEOUS at 06:44

## 2020-01-22 RX ADMIN — HEPARIN SODIUM 5000 UNIT(S): 5000 INJECTION INTRAVENOUS; SUBCUTANEOUS at 00:45

## 2020-01-22 RX ADMIN — HEPARIN SODIUM 5000 UNIT(S): 5000 INJECTION INTRAVENOUS; SUBCUTANEOUS at 13:18

## 2020-01-22 RX ADMIN — AZITHROMYCIN 255 MILLIGRAM(S): 500 TABLET, FILM COATED ORAL at 19:04

## 2020-01-22 RX ADMIN — CEFTRIAXONE 100 MILLIGRAM(S): 500 INJECTION, POWDER, FOR SOLUTION INTRAMUSCULAR; INTRAVENOUS at 19:03

## 2020-01-22 RX ADMIN — SODIUM CHLORIDE 75 MILLILITER(S): 9 INJECTION, SOLUTION INTRAVENOUS at 11:52

## 2020-01-22 RX ADMIN — HEPARIN SODIUM 5000 UNIT(S): 5000 INJECTION INTRAVENOUS; SUBCUTANEOUS at 21:47

## 2020-01-22 NOTE — PROGRESS NOTE ADULT - PROBLEM SELECTOR PLAN 6
Transitions of Care Status:  1.  Name of PCP: no PCP  2.  PCP Contacted on Admission: [ ] Y    [ x] N    3.  PCP contacted at Discharge: [ ] Y    [ ] N    [ ] N/A  4.  Post-Discharge Appointment Date and Location:  5.  Summary of Handoff given to PCP:

## 2020-01-22 NOTE — PROGRESS NOTE ADULT - ATTENDING COMMENTS
89 y/o female, NO PCP, on ASA at home for primary prevention (no clear indication), HX of Fall 4 years ago, wheelchair Bound, pt is A+O x 3 at baseline, lives with family and HHA, admitted for metabolic encephalopathy 2/2 CAP, ? UTI, and hyponatremia   Not responding appropriately on exam, baseline A&Ox4  Will consult ST when patient's mental status improves, for now resume IV D5NS  DNR/DNI, will need GOC  Continue IV Ceftriaxone and azithromycin, urine cx proteus 50-99K CFU, blood cx and legionella pending  Fall/aspiration precaution, PT consulted

## 2020-01-22 NOTE — PROGRESS NOTE ADULT - PROBLEM SELECTOR PLAN 1
- likely 2/2 metabolic encephalopathy from infectious etiology; CXR w/ hazy RUL and RML opacity and U/A positive for UTI  - CTA head and neck showed patent vessels but was limited from motion artifact  - f/u TSH, b12, folate, RPR screen   - f/u blood cx   - continue to monitor mental status

## 2020-01-22 NOTE — PROGRESS NOTE ADULT - PROBLEM SELECTOR PLAN 2
- CXR w/ RUL and RML opacity likely a PNA.  No hospitalizations in the last 3 months  - RVP negative   - c/w 500 IV azithro x 3 days; can d/c if urine legionella is negative  - c/w ceftriaxone x 5 - 7 days

## 2020-01-22 NOTE — PROGRESS NOTE ADULT - SUBJECTIVE AND OBJECTIVE BOX
Donna mSith MD   [facility] Medicine  Pager [phone]      PROGRESS NOTE:     Patient is a 89y old  Female who presents with a chief complaint of AMS (2020 19:46)      SUBJECTIVE / OVERNIGHT EVENTS:    No acute events overnight. Patient remains lethargic. Unable to perform ROS 2/2 to patient's mental status.    MEDICATIONS  (STANDING):  azithromycin  IVPB 500 milliGRAM(s) IV Intermittent every 24 hours  cefTRIAXone   IVPB 1000 milliGRAM(s) IV Intermittent every 24 hours  dextrose 5% + sodium chloride 0.9%. 1000 milliLiter(s) (75 mL/Hr) IV Continuous <Continuous>  dextrose 5%. 1000 milliLiter(s) (50 mL/Hr) IV Continuous <Continuous>  dextrose 50% Injectable 12.5 Gram(s) IV Push once  dextrose 50% Injectable 25 Gram(s) IV Push once  dextrose 50% Injectable 25 Gram(s) IV Push once  heparin  Injectable 5000 Unit(s) SubCutaneous every 8 hours    MEDICATIONS  (PRN):  acetaminophen  Suppository .. 650 milliGRAM(s) Rectal every 6 hours PRN Temp greater or equal to 38C (100.4F)  albuterol/ipratropium for Nebulization. 3 milliLiter(s) Nebulizer every 4 hours PRN Shortness of Breath and/or Wheezing  dextrose 40% Gel 15 Gram(s) Oral once PRN Blood Glucose LESS THAN 70 milliGRAM(s)/deciliter  glucagon  Injectable 1 milliGRAM(s) IntraMuscular once PRN Glucose LESS THAN 70 milligrams/deciliter      CAPILLARY BLOOD GLUCOSE      POCT Blood Glucose.: 84 mg/dL (2020 12:12)  POCT Blood Glucose.: 91 mg/dL (2020 06:33)  POCT Blood Glucose.: 69 mg/dL (2020 06:20)  POCT Blood Glucose.: 92 mg/dL (2020 01:46)  POCT Blood Glucose.: 95 mg/dL (2020 16:32)    I&O's Summary      PHYSICAL EXAM:  Vital Signs Last 24 Hrs  T(C): 36.6 (2020 13:43), Max: 37.9 (2020 17:10)  T(F): 97.8 (2020 13:43), Max: 100.2 (2020 17:10)  HR: 62 (2020 13:43) (61 - 84)  BP: 102/81 (2020 13:43) (102/81 - 153/76)  BP(mean): --  RR: 20 (2020 13:43) (18 - 22)  SpO2: 98% (2020 13:43) (98% - 100%)    CONSTITUTIONAL: acute distress, thrashing in bed   RESPIRATORY: Normal respiratory effort; lungs are clear to auscultation bilaterally  CARDIOVASCULAR: Regular rate and rhythm, normal S1 and S2, no murmur/rub/gallop; No lower extremity edema; Peripheral pulses are 2+ bilaterally  ABDOMEN: Nontender to palpation, normoactive bowel sounds, no rebound/guarding; No hepatosplenomegaly  MUSCLOSKELETAL: no clubbing or cyanosis of digits; no joint swelling or tenderness to palpation  PSYCH: lethargic, A+Ox0     LABS:                        12.5   7.96  )-----------( 202      ( 2020 06:20 )             38.6     22    132<L>  |  97<L>  |  10  ----------------------------<  84  4.6   |  24  |  0.71    Ca    7.4<L>      2020 06:20  Phos  4.0       Mg     1.7         TPro  6.1  /  Alb  3.0<L>  /  TBili  0.5  /  DBili  x   /  AST  21  /  ALT  14  /  AlkPhos  90  01-22    PT/INR - ( 2020 16:46 )   PT: 11.1 SEC;   INR: 0.97          PTT - ( 2020 16:46 )  PTT:28.7 SEC      Urinalysis Basic - ( 2020 17:36 )    Color: YELLOW / Appearance: CLEAR / S.040 / pH: 7.0  Gluc: NEGATIVE / Ketone: NEGATIVE  / Bili: NEGATIVE / Urobili: NORMAL   Blood: NEGATIVE / Protein: 70 / Nitrite: NEGATIVE   Leuk Esterase: LARGE / RBC: 0-2 / WBC >50   Sq Epi: OCC / Non Sq Epi: x / Bacteria: FEW        Culture - Urine (collected 2020 17:46)  Source: URINE MIDSTREAM  Preliminary Report (2020 11:39):    PM^Proteus mirabilis    COLONY COUNT: 50,000-99,000 CFU/mL        RADIOLOGY & ADDITIONAL TESTS:  Results Reviewed:   Imaging Personally Reviewed:  Electrocardiogram Personally Reviewed:    COORDINATION OF CARE:  Care Discussed with Consultants/Other Providers [Y/N]:  Prior or Outpatient Records Reviewed [Y/N]:

## 2020-01-22 NOTE — PROGRESS NOTE ADULT - ASSESSMENT
89 yo F with no significant PMHx admitted for AMS 2/2 metabolic encephalopathy likely from CAP and UTI.

## 2020-01-22 NOTE — PROGRESS NOTE ADULT - PROBLEM SELECTOR PLAN 4
- likely in setting of poor PO intake   - received 1L NS bolus on arrival   - urine osm 381, serum osm 277  - NA uptrending  - f/u repeat BMP

## 2020-01-23 ENCOUNTER — TRANSCRIPTION ENCOUNTER (OUTPATIENT)
Age: 85
End: 2020-01-23

## 2020-01-23 DIAGNOSIS — R47.1 DYSARTHRIA AND ANARTHRIA: ICD-10-CM

## 2020-01-23 LAB
-  AMIKACIN: SIGNIFICANT CHANGE UP
-  AMPICILLIN/SULBACTAM: SIGNIFICANT CHANGE UP
-  AMPICILLIN: SIGNIFICANT CHANGE UP
-  AZTREONAM: SIGNIFICANT CHANGE UP
-  CEFAZOLIN: SIGNIFICANT CHANGE UP
-  CEFEPIME: SIGNIFICANT CHANGE UP
-  CEFOXITIN: SIGNIFICANT CHANGE UP
-  CEFTAZIDIME: SIGNIFICANT CHANGE UP
-  CEFTRIAXONE: SIGNIFICANT CHANGE UP
-  ERTAPENEM: SIGNIFICANT CHANGE UP
-  GENTAMICIN: SIGNIFICANT CHANGE UP
-  LEVOFLOXACIN: SIGNIFICANT CHANGE UP
-  MEROPENEM: SIGNIFICANT CHANGE UP
-  NITROFURANTOIN: SIGNIFICANT CHANGE UP
-  PIPERACILLIN/TAZOBACTAM: SIGNIFICANT CHANGE UP
-  TOBRAMYCIN: SIGNIFICANT CHANGE UP
-  TRIMETHOPRIM/SULFAMETHOXAZOLE: SIGNIFICANT CHANGE UP
ANION GAP SERPL CALC-SCNC: 8 MMO/L — SIGNIFICANT CHANGE UP (ref 7–14)
BACTERIA UR CULT: SIGNIFICANT CHANGE UP
BASOPHILS # BLD AUTO: 0.09 K/UL — SIGNIFICANT CHANGE UP (ref 0–0.2)
BASOPHILS NFR BLD AUTO: 1.2 % — SIGNIFICANT CHANGE UP (ref 0–2)
BUN SERPL-MCNC: 11 MG/DL — SIGNIFICANT CHANGE UP (ref 7–23)
CALCIUM SERPL-MCNC: 9.1 MG/DL — SIGNIFICANT CHANGE UP (ref 8.4–10.5)
CHLORIDE SERPL-SCNC: 95 MMOL/L — LOW (ref 98–107)
CO2 SERPL-SCNC: 27 MMOL/L — SIGNIFICANT CHANGE UP (ref 22–31)
CREAT SERPL-MCNC: 0.76 MG/DL — SIGNIFICANT CHANGE UP (ref 0.5–1.3)
EOSINOPHIL # BLD AUTO: 0.41 K/UL — SIGNIFICANT CHANGE UP (ref 0–0.5)
EOSINOPHIL NFR BLD AUTO: 5.3 % — SIGNIFICANT CHANGE UP (ref 0–6)
GLUCOSE SERPL-MCNC: 97 MG/DL — SIGNIFICANT CHANGE UP (ref 70–99)
HCT VFR BLD CALC: 41.4 % — SIGNIFICANT CHANGE UP (ref 34.5–45)
HGB BLD-MCNC: 12.9 G/DL — SIGNIFICANT CHANGE UP (ref 11.5–15.5)
IMM GRANULOCYTES NFR BLD AUTO: 0.3 % — SIGNIFICANT CHANGE UP (ref 0–1.5)
LYMPHOCYTES # BLD AUTO: 2.12 K/UL — SIGNIFICANT CHANGE UP (ref 1–3.3)
LYMPHOCYTES # BLD AUTO: 27.5 % — SIGNIFICANT CHANGE UP (ref 13–44)
MAGNESIUM SERPL-MCNC: 1.9 MG/DL — SIGNIFICANT CHANGE UP (ref 1.6–2.6)
MCHC RBC-ENTMCNC: 28.4 PG — SIGNIFICANT CHANGE UP (ref 27–34)
MCHC RBC-ENTMCNC: 31.2 % — LOW (ref 32–36)
MCV RBC AUTO: 91.2 FL — SIGNIFICANT CHANGE UP (ref 80–100)
METHOD TYPE: SIGNIFICANT CHANGE UP
MONOCYTES # BLD AUTO: 0.74 K/UL — SIGNIFICANT CHANGE UP (ref 0–0.9)
MONOCYTES NFR BLD AUTO: 9.6 % — SIGNIFICANT CHANGE UP (ref 2–14)
NEUTROPHILS # BLD AUTO: 4.33 K/UL — SIGNIFICANT CHANGE UP (ref 1.8–7.4)
NEUTROPHILS NFR BLD AUTO: 56.1 % — SIGNIFICANT CHANGE UP (ref 43–77)
NRBC # FLD: 0 K/UL — SIGNIFICANT CHANGE UP (ref 0–0)
ORGANISM # SPEC MICROSCOPIC CNT: SIGNIFICANT CHANGE UP
ORGANISM # SPEC MICROSCOPIC CNT: SIGNIFICANT CHANGE UP
PHOSPHATE SERPL-MCNC: 3 MG/DL — SIGNIFICANT CHANGE UP (ref 2.5–4.5)
PLATELET # BLD AUTO: 211 K/UL — SIGNIFICANT CHANGE UP (ref 150–400)
PMV BLD: 10.7 FL — SIGNIFICANT CHANGE UP (ref 7–13)
POTASSIUM SERPL-MCNC: 4 MMOL/L — SIGNIFICANT CHANGE UP (ref 3.5–5.3)
POTASSIUM SERPL-SCNC: 4 MMOL/L — SIGNIFICANT CHANGE UP (ref 3.5–5.3)
RBC # BLD: 4.54 M/UL — SIGNIFICANT CHANGE UP (ref 3.8–5.2)
RBC # FLD: 15.1 % — HIGH (ref 10.3–14.5)
SODIUM SERPL-SCNC: 130 MMOL/L — LOW (ref 135–145)
WBC # BLD: 7.71 K/UL — SIGNIFICANT CHANGE UP (ref 3.8–10.5)
WBC # FLD AUTO: 7.71 K/UL — SIGNIFICANT CHANGE UP (ref 3.8–10.5)

## 2020-01-23 PROCEDURE — 99233 SBSQ HOSP IP/OBS HIGH 50: CPT | Mod: GC

## 2020-01-23 PROCEDURE — 71045 X-RAY EXAM CHEST 1 VIEW: CPT | Mod: 26

## 2020-01-23 RX ORDER — FUROSEMIDE 40 MG
20 TABLET ORAL ONCE
Refills: 0 | Status: COMPLETED | OUTPATIENT
Start: 2020-01-23 | End: 2020-01-23

## 2020-01-23 RX ORDER — HYDRALAZINE HCL 50 MG
10 TABLET ORAL ONCE
Refills: 0 | Status: COMPLETED | OUTPATIENT
Start: 2020-01-23 | End: 2020-01-23

## 2020-01-23 RX ADMIN — Medication 10 MILLIGRAM(S): at 22:34

## 2020-01-23 RX ADMIN — HEPARIN SODIUM 5000 UNIT(S): 5000 INJECTION INTRAVENOUS; SUBCUTANEOUS at 06:41

## 2020-01-23 RX ADMIN — HEPARIN SODIUM 5000 UNIT(S): 5000 INJECTION INTRAVENOUS; SUBCUTANEOUS at 13:08

## 2020-01-23 RX ADMIN — Medication 20 MILLIGRAM(S): at 18:06

## 2020-01-23 RX ADMIN — HEPARIN SODIUM 5000 UNIT(S): 5000 INJECTION INTRAVENOUS; SUBCUTANEOUS at 22:34

## 2020-01-23 RX ADMIN — CEFTRIAXONE 100 MILLIGRAM(S): 500 INJECTION, POWDER, FOR SOLUTION INTRAMUSCULAR; INTRAVENOUS at 19:01

## 2020-01-23 RX ADMIN — SODIUM CHLORIDE 75 MILLILITER(S): 9 INJECTION, SOLUTION INTRAVENOUS at 04:41

## 2020-01-23 NOTE — SWALLOW BEDSIDE ASSESSMENT ADULT - SWALLOW EVAL: DIAGNOSIS
1. Mild oral phase dysphagia for puree consistency, honey thick liquids and nectar thick liquids marked by reduced bolus manipulation, reduced anterior to posterior transport and delay in oral transit time 2. Moderate oral phase dysphagia for thin liquids marked by reduced labial seal with labial spillage, reduced bolus manipulation, and suspected posterior loss of bolus 3. Severe pharyngeal phase dysphagia across all consistencies presented marked by reduced laryngeal elevation upon digital palpation with evidence of cough response and throat clear subsequent deglutition indicative of laryngeal penetration/aspiration.

## 2020-01-23 NOTE — DISCHARGE NOTE PROVIDER - CARE PROVIDER_API CALL
Savanah Mercado)  Internal Medicine  [address] [place] [address]  Phone: [phone]  Fax: [phone]  Follow Up Time: 1 week

## 2020-01-23 NOTE — PROGRESS NOTE ADULT - PROBLEM SELECTOR PLAN 4
- likely in setting of poor PO intake   - received 1L NS bolus on arrival   - urine osm 381, serum osm 277  - NA uptrending  - f/u repeat BMP - likely in setting of poor PO intake   - received 1L NS bolus on arrival   - urine osm 381, serum osm 277 consistent with SIADH picture   - NA uptrending  - trend NA -pt displaying signs of CVA - including dysarthria and facial droop   -will undergo GOC conversation with patient/family to discuss role of further work-up (MRI) as well as medical management

## 2020-01-23 NOTE — PROGRESS NOTE ADULT - PROBLEM SELECTOR PLAN 5
- DVT ppx: subq heparin  - Diet: NPO until mental status improves, c/w D5+NS 75 cc/hr while pt NPO - DVT ppx: subq heparin  - Diet: NPO - pt failed S+S, will need to undergo GOC conversation - likely in setting of poor PO intake   - received 1L NS bolus on arrival   - urine osm 381, serum osm 277 consistent with SIADH picture   - NA uptrending  - trend NA

## 2020-01-23 NOTE — DISCHARGE NOTE PROVIDER - HOSPITAL COURSE
HPI    89 yo F who hasn't seen a doctor in years and is on only baby ASA at the recommendation of her daughter in law is presenting from home w/ AMS x 1 day.  At baseline pt is wheelchair bound and AAO x 3; 4 years ago she had a fall and has been wheelchair bound since and has been living w/ her son and his family.  They have a HHA that noticed that pt was communicating less since this morning and not fully responding verbally to commands. Aide and family states that this is the first occurrence of these symptoms. Brought to ED for further evaluation.         HOSPITAL COURSE    On presentation, the patient was hemodynamically stable. Labs were significant for hyponatremia and positive urinalysis. Imaging was significant for a RUL and RML opacity consistent with pneumonia. RVP and urine legionella were negative. CTA head and neck showed patent vessels but was limited from motion artifact Patient was started on ceftriaxone in order to provide coverage for both the pneumonia and UTI. Blood cultures remained negative. On arrival patient was A+Ox0 but after receiving antibiotics patient's mental status improved back to her baseline of A+Ox3. Patient's sodium improved. Patient displayed drooped mouth and dysarthric speech concerning for possible stroke. Patient failed her  speech and swallow evaluation. For which, goals of care conversation was initiated and it was decided that _____.                         Discharge plan discussed with patient. Patient expresses understanding and agreement. Patient is hemodynamically stable at the time of discharge. Patient should follow-up with their PCP within 1 week of discharge for further monitoring. HPI    89 yo F who hasn't seen a doctor in years and is on only baby ASA at the recommendation of her daughter in law is presenting from home w/ AMS x 1 day.  At baseline pt is wheelchair bound and AAO x 3; 4 years ago she had a fall and has been wheelchair bound since and has been living w/ her son and his family.  They have a HHA that noticed that pt was communicating less since this morning and not fully responding verbally to commands. Aide and family states that this is the first occurrence of these symptoms. Brought to ED for further evaluation.         HOSPITAL COURSE    On presentation, the patient was hemodynamically stable. Labs were significant for hyponatremia and positive urinalysis. Imaging was significant for a RUL and RML opacity consistent with pneumonia. RVP and urine legionella were negative. CTA head and neck showed patent vessels but was limited from motion artifact Patient was started on ceftriaxone in order to provide coverage for both the pneumonia and UTI. Blood cultures remained negative. On arrival patient was A+Ox0 but after receiving antibiotics patient's mental status improved back to her baseline of A+Ox3. Patient completed antibiotics for pneumonia and UTI. Patient's sodium improved. Of note, patient displayed drooped mouth and dysarthric speech concerning for possible stroke. It was recommended that the patient get an MRI brain, but based on discussion with patient/family, it was decided to defer further imaging at this time.             Discharge plan discussed with patient. Patient expresses understanding and agreement. Patient is hemodynamically stable at the time of discharge. Patient should follow-up with their PCP within 1 week of discharge for further monitoring.

## 2020-01-23 NOTE — PROGRESS NOTE ADULT - SUBJECTIVE AND OBJECTIVE BOX
Donna Smith MD   [facility] Medicine  Pager [phone]      PROGRESS NOTE:     Patient is a 89y old  Female who presents with a chief complaint of AMS (2020 15:14)      SUBJECTIVE / OVERNIGHT EVENTS:    No acute events overnight. Patient examined at bedside and reports feeling well. Currently A+Ox2. Patient with gargled speech, but denies pain.     Denies SOB, chest pain, nausea, vomiting, diarrhea, constipation.    MEDICATIONS  (STANDING):  azithromycin  IVPB 500 milliGRAM(s) IV Intermittent every 24 hours  cefTRIAXone   IVPB 1000 milliGRAM(s) IV Intermittent every 24 hours  dextrose 5% + sodium chloride 0.9%. 1000 milliLiter(s) (75 mL/Hr) IV Continuous <Continuous>  dextrose 5%. 1000 milliLiter(s) (50 mL/Hr) IV Continuous <Continuous>  dextrose 50% Injectable 12.5 Gram(s) IV Push once  dextrose 50% Injectable 25 Gram(s) IV Push once  dextrose 50% Injectable 25 Gram(s) IV Push once  heparin  Injectable 5000 Unit(s) SubCutaneous every 8 hours    MEDICATIONS  (PRN):  acetaminophen  Suppository .. 650 milliGRAM(s) Rectal every 6 hours PRN Temp greater or equal to 38C (100.4F)  albuterol/ipratropium for Nebulization. 3 milliLiter(s) Nebulizer every 4 hours PRN Shortness of Breath and/or Wheezing  dextrose 40% Gel 15 Gram(s) Oral once PRN Blood Glucose LESS THAN 70 milliGRAM(s)/deciliter  glucagon  Injectable 1 milliGRAM(s) IntraMuscular once PRN Glucose LESS THAN 70 milligrams/deciliter      CAPILLARY BLOOD GLUCOSE      POCT Blood Glucose.: 103 mg/dL (2020 07:02)  POCT Blood Glucose.: 109 mg/dL (2020 22:24)  POCT Blood Glucose.: 102 mg/dL (2020 18:07)  POCT Blood Glucose.: 84 mg/dL (2020 12:12)    I&O's Summary    2020 07:01  -  2020 07:00  --------------------------------------------------------  IN: 1200 mL / OUT: 0 mL / NET: 1200 mL        PHYSICAL EXAM:  Vital Signs Last 24 Hrs  T(C): 36.7 (2020 06:32), Max: 37.2 (2020 01:15)  T(F): 98 (2020 06:32), Max: 99 (2020 01:15)  HR: 67 (2020 06:32) (61 - 79)  BP: 162/67 (2020 06:32) (102/81 - 165/82)  BP(mean): --  RR: 20 (2020 06:32) (18 - 20)  SpO2: 97% (2020 06:32) (90% - 99%)    CONSTITUTIONAL: NAD, disheveled elderly female  RESPIRATORY: Normal respiratory effort; lungs are clear to auscultation bilaterally  CARDIOVASCULAR: Regular rate and rhythm, normal S1 and S2, no murmur/rub/gallop; No lower extremity edema; Peripheral pulses are 2+ bilaterally  ABDOMEN: Nontender to palpation, normoactive bowel sounds, no rebound/guarding; No hepatosplenomegaly  MUSCLOSKELETAL: no clubbing or cyanosis of digits; no joint swelling or tenderness to palpation  PSYCH: A+O x2, with gargled speech     LABS:                        12.5   7.96  )-----------( 202      ( 2020 06:20 )             38.6     22    132<L>  |  97<L>  |  10  ----------------------------<  84  4.6   |  24  |  0.71    Ca    7.4<L>      2020 06:20  Phos  4.0       Mg     1.7         TPro  6.1  /  Alb  3.0<L>  /  TBili  0.5  /  DBili  x   /  AST  21  /  ALT  14  /  AlkPhos  90  -22    PT/INR - ( 2020 16:46 )   PT: 11.1 SEC;   INR: 0.97          PTT - ( 2020 16:46 )  PTT:28.7 SEC      Urinalysis Basic - ( 2020 17:36 )    Color: YELLOW / Appearance: CLEAR / S.040 / pH: 7.0  Gluc: NEGATIVE / Ketone: NEGATIVE  / Bili: NEGATIVE / Urobili: NORMAL   Blood: NEGATIVE / Protein: 70 / Nitrite: NEGATIVE   Leuk Esterase: LARGE / RBC: 0-2 / WBC >50   Sq Epi: OCC / Non Sq Epi: x / Bacteria: FEW        Culture - Urine (collected 2020 17:46)  Source: URINE MIDSTREAM  Preliminary Report (2020 11:39):    PM^Proteus mirabilis    COLONY COUNT: 50,000-99,000 CFU/mL    Culture - Blood (collected 2020 16:59)  Source: BLOOD VENOUS  Preliminary Report (2020 16:59):    NO ORGANISMS ISOLATED    NO ORGANISMS ISOLATED AT 24 HOURS    Culture - Blood (collected 2020 16:59)  Source: BLOOD PERIPHERAL  Preliminary Report (2020 16:59):    NO ORGANISMS ISOLATED    NO ORGANISMS ISOLATED AT 24 HOURS        RADIOLOGY & ADDITIONAL TESTS:  Results Reviewed:   Imaging Personally Reviewed:  Electrocardiogram Personally Reviewed:    COORDINATION OF CARE:  Care Discussed with Consultants/Other Providers [Y/N]:  Prior or Outpatient Records Reviewed [Y/N]: Donna Smith MD   [facility] Medicine  Pager [phone]      PROGRESS NOTE:     Patient is a 89y old  Female who presents with a chief complaint of AMS (2020 15:14)      SUBJECTIVE / OVERNIGHT EVENTS:    No acute events overnight. Patient examined at bedside and reports feeling well. Currently A+Ox3. Patient with gargled speech, but denies pain.     Denies SOB, chest pain, nausea, vomiting, diarrhea, constipation.    MEDICATIONS  (STANDING):  azithromycin  IVPB 500 milliGRAM(s) IV Intermittent every 24 hours  cefTRIAXone   IVPB 1000 milliGRAM(s) IV Intermittent every 24 hours  dextrose 5% + sodium chloride 0.9%. 1000 milliLiter(s) (75 mL/Hr) IV Continuous <Continuous>  dextrose 5%. 1000 milliLiter(s) (50 mL/Hr) IV Continuous <Continuous>  dextrose 50% Injectable 12.5 Gram(s) IV Push once  dextrose 50% Injectable 25 Gram(s) IV Push once  dextrose 50% Injectable 25 Gram(s) IV Push once  heparin  Injectable 5000 Unit(s) SubCutaneous every 8 hours    MEDICATIONS  (PRN):  acetaminophen  Suppository .. 650 milliGRAM(s) Rectal every 6 hours PRN Temp greater or equal to 38C (100.4F)  albuterol/ipratropium for Nebulization. 3 milliLiter(s) Nebulizer every 4 hours PRN Shortness of Breath and/or Wheezing  dextrose 40% Gel 15 Gram(s) Oral once PRN Blood Glucose LESS THAN 70 milliGRAM(s)/deciliter  glucagon  Injectable 1 milliGRAM(s) IntraMuscular once PRN Glucose LESS THAN 70 milligrams/deciliter      CAPILLARY BLOOD GLUCOSE      POCT Blood Glucose.: 103 mg/dL (2020 07:02)  POCT Blood Glucose.: 109 mg/dL (2020 22:24)  POCT Blood Glucose.: 102 mg/dL (2020 18:07)  POCT Blood Glucose.: 84 mg/dL (2020 12:12)    I&O's Summary    2020 07:01  -  2020 07:00  --------------------------------------------------------  IN: 1200 mL / OUT: 0 mL / NET: 1200 mL        PHYSICAL EXAM:  Vital Signs Last 24 Hrs  T(C): 36.7 (2020 06:32), Max: 37.2 (2020 01:15)  T(F): 98 (2020 06:32), Max: 99 (2020 01:15)  HR: 67 (2020 06:32) (61 - 79)  BP: 162/67 (2020 06:32) (102/81 - 165/82)  BP(mean): --  RR: 20 (2020 06:32) (18 - 20)  SpO2: 97% (2020 06:32) (90% - 99%)    CONSTITUTIONAL: NAD, disheveled elderly female  RESPIRATORY: Normal respiratory effort; lungs are clear to auscultation bilaterally  CARDIOVASCULAR: Regular rate and rhythm, normal S1 and S2, no murmur/rub/gallop; No lower extremity edema; Peripheral pulses are 2+ bilaterally  ABDOMEN: Nontender to palpation, normoactive bowel sounds, no rebound/guarding; No hepatosplenomegaly  MUSCLOSKELETAL: no clubbing or cyanosis of digits; no joint swelling or tenderness to palpation  PSYCH: A+O x3, with dysarthric speech    LABS:                        12.5   7.96  )-----------( 202      ( 2020 06:20 )             38.6     22    132<L>  |  97<L>  |  10  ----------------------------<  84  4.6   |  24  |  0.71    Ca    7.4<L>      2020 06:20  Phos  4.0       Mg     1.7         TPro  6.1  /  Alb  3.0<L>  /  TBili  0.5  /  DBili  x   /  AST  21  /  ALT  14  /  AlkPhos  90  -22    PT/INR - ( 2020 16:46 )   PT: 11.1 SEC;   INR: 0.97          PTT - ( 2020 16:46 )  PTT:28.7 SEC      Urinalysis Basic - ( 2020 17:36 )    Color: YELLOW / Appearance: CLEAR / S.040 / pH: 7.0  Gluc: NEGATIVE / Ketone: NEGATIVE  / Bili: NEGATIVE / Urobili: NORMAL   Blood: NEGATIVE / Protein: 70 / Nitrite: NEGATIVE   Leuk Esterase: LARGE / RBC: 0-2 / WBC >50   Sq Epi: OCC / Non Sq Epi: x / Bacteria: FEW        Culture - Urine (collected 2020 17:46)  Source: URINE MIDSTREAM  Preliminary Report (2020 11:39):    PM^Proteus mirabilis    COLONY COUNT: 50,000-99,000 CFU/mL    Culture - Blood (collected 2020 16:59)  Source: BLOOD VENOUS  Preliminary Report (2020 16:59):    NO ORGANISMS ISOLATED    NO ORGANISMS ISOLATED AT 24 HOURS    Culture - Blood (collected 2020 16:59)  Source: BLOOD PERIPHERAL  Preliminary Report (2020 16:59):    NO ORGANISMS ISOLATED    NO ORGANISMS ISOLATED AT 24 HOURS        RADIOLOGY & ADDITIONAL TESTS:  Results Reviewed:   Imaging Personally Reviewed:  Electrocardiogram Personally Reviewed:    COORDINATION OF CARE:  Care Discussed with Consultants/Other Providers [Y/N]:  Prior or Outpatient Records Reviewed [Y/N]:

## 2020-01-23 NOTE — PROGRESS NOTE ADULT - PROBLEM SELECTOR PLAN 6
Transitions of Care Status:  1.  Name of PCP: no PCP  2.  PCP Contacted on Admission: [ ] Y    [ x] N    3.  PCP contacted at Discharge: [ ] Y    [ ] N    [ ] N/A  4.  Post-Discharge Appointment Date and Location:  5.  Summary of Handoff given to PCP: - DVT ppx: subq heparin  - Diet: NPO - pt failed S+S, will need to undergo GOC conversation

## 2020-01-23 NOTE — PROGRESS NOTE ADULT - PROBLEM SELECTOR PLAN 2
- CXR w/ RUL and RML opacity likely a PNA.  No hospitalizations in the last 3 months  - RVP negative   - c/w 500 IV azithro x 3 days; can d/c if urine legionella is negative  - c/w ceftriaxone x 5 - 7 days - CXR w/ RUL and RML opacity likely a PNA.  No hospitalizations in the last 3 months  - RVP negative   - urine legionella - neg  - c/w ceftriaxone for 5 days (1/21 -->)

## 2020-01-23 NOTE — PROGRESS NOTE ADULT - PROBLEM SELECTOR PLAN 3
- U/A positive for UTI  - urine culture: GNR  - c/w ceftriaxone  - f/u urine culture sensitivities - U/A positive for UTI  - urine culture: proteus   - s/p ceftriaxone for 3 day course

## 2020-01-23 NOTE — SWALLOW BEDSIDE ASSESSMENT ADULT - SWALLOW EVAL: RECOMMENDED DIET
1. Oral means of nutrition/hydration/medication contraindicated at this time 2. Consider short term non oral means of nutrition/ hydration/ medication at MD's discretion 3. Reconsult this service to re-assess for PO candidacy when patient's overall medical status improves

## 2020-01-23 NOTE — PROGRESS NOTE ADULT - ATTENDING COMMENTS
89 y/o female, NO PCP, on ASA at home for primary prevention (no clear indication), HX of Fall 4 years ago, wheelchair Bound, pt is A+O x 3 at baseline, lives with family and HHA, admitted for metabolic encephalopathy 2/2 CAP, UTI, hyponatremia and high suspicion for CVA  Baseline A&Ox4, today more alert, responding appropriately but slurred speech and facial droop, wheezing on exam, denies any pulmonary disease/smoking hx, previously with diastolic dysfunction, hyperdynamic LV EF 80-85%  Failed swallow evaluation, DC IVF for concern of pulmonary edema, CXR pending, TTE ordered, will give IV 20 lasix  DNR/DNI, will need GOC, planning to discuss with family this afternoon about obtaining MRI/stroke w/u/nutritional status  Continue IV Ceftriaxone Day 3/5, azithromycin DC, urine cx proteus 50-99K CFU, sensitivities reviewed, blood cx NTD  Fall/aspiration precaution, PT consulted 89 y/o female, NO PCP, on ASA at home for primary prevention (no clear indication), HX of Fall 4 years ago, wheelchair Bound, pt is A+O x 3 at baseline, lives with family and HHA, admitted for metabolic encephalopathy 2/2 CAP, UTI, hyponatremia and high suspicion for CVA  Baseline A&Ox4, today more alert, responding appropriately but slurred speech and facial droop, wheezing on exam, denies any pulmonary disease/smoking hx, previously with diastolic dysfunction, hyperdynamic LV EF 80-85%  Failed swallow evaluation, will discuss with family about pleasure feeds, palliative measures, DC IVF for concern of pulmonary edema, CXR pending, TTE ordered, will give IV 20 lasix  DNR/DNI, will need GOC, planning to discuss with family this afternoon about obtaining MRI/stroke w/u, CT angio of head/neck was limited exam due to motion artifact, no significant stenosis of the extracranial carotid arteries or vertebral arteries identified.  Continue IV Ceftriaxone Day 3/5, azithromycin DC, urine cx proteus 50-99K CFU, sensitivities reviewed, blood cx NTD  Fall/aspiration precaution, PT consulted

## 2020-01-23 NOTE — DISCHARGE NOTE PROVIDER - NSDCCPCAREPLAN_GEN_ALL_CORE_FT
PRINCIPAL DISCHARGE DIAGNOSIS  Diagnosis: Altered mental status  Assessment and Plan of Treatment: You were admitted to the hospital due to a change in your mental status. You were found to have a urinary tract infection as well as pneumonia that was likely causing your change in mental status. You were treated with antibiotics and your mental status improved.      SECONDARY DISCHARGE DIAGNOSES  Diagnosis: Hyponatremia  Assessment and Plan of Treatment:     Diagnosis: Pneumonia  Assessment and Plan of Treatment: PRINCIPAL DISCHARGE DIAGNOSIS  Diagnosis: Altered mental status  Assessment and Plan of Treatment: You were admitted to the hospital due to a change in your mental status. You were found to have a urinary tract infection as well as pneumonia that was likely causing your change in mental status. You were treated with antibiotics and your mental status improved.      SECONDARY DISCHARGE DIAGNOSES  Diagnosis: Pneumonia  Assessment and Plan of Treatment: PRINCIPAL DISCHARGE DIAGNOSIS  Diagnosis: Altered mental status  Assessment and Plan of Treatment: You were admitted to the hospital due to a change in your mental status. You were found to have a urinary tract infection as well as pneumonia that was likely causing your change in mental status. You were treated with antibiotics and your mental status improved. There was concern that you had a stroke given new onset facial droop and you were having difficulty speaking. However, after discussion with you and your family it was decided to defer a brain MRI at this time.      SECONDARY DISCHARGE DIAGNOSES  Diagnosis: Pneumonia  Assessment and Plan of Treatment: You had a chest x-ray which showed pneumonia for which you were treated with antibiotics and your mental status improved.    Diagnosis: Cystitis  Assessment and Plan of Treatment: You were found to have a urinary tract infection for which you were treated with antibiotics and your mental status improved.

## 2020-01-23 NOTE — PROGRESS NOTE ADULT - PROBLEM SELECTOR PLAN 1
- likely 2/2 metabolic encephalopathy from infectious etiology; CXR w/ hazy RUL and RML opacity and U/A positive for UTI  - CTA head and neck showed patent vessels but was limited from motion artifact  - f/u TSH, b12, folate, RPR screen   - f/u blood cx   - continue to monitor mental status - likely 2/2 metabolic encephalopathy from infectious etiology; CXR w/ hazy RUL and RML opacity and U/A positive for UTI  - CTA head and neck showed patent vessels but was limited from motion artifact  - blood cx: NGTD   - mental status now improved

## 2020-01-24 DIAGNOSIS — I63.9 CEREBRAL INFARCTION, UNSPECIFIED: ICD-10-CM

## 2020-01-24 DIAGNOSIS — Z51.5 ENCOUNTER FOR PALLIATIVE CARE: ICD-10-CM

## 2020-01-24 DIAGNOSIS — Z71.89 OTHER SPECIFIED COUNSELING: ICD-10-CM

## 2020-01-24 DIAGNOSIS — R41.82 ALTERED MENTAL STATUS, UNSPECIFIED: ICD-10-CM

## 2020-01-24 DIAGNOSIS — J69.0 PNEUMONITIS DUE TO INHALATION OF FOOD AND VOMIT: ICD-10-CM

## 2020-01-24 DIAGNOSIS — R13.10 DYSPHAGIA, UNSPECIFIED: ICD-10-CM

## 2020-01-24 DIAGNOSIS — R13.19 OTHER DYSPHAGIA: ICD-10-CM

## 2020-01-24 LAB
ANION GAP SERPL CALC-SCNC: 13 MMO/L — SIGNIFICANT CHANGE UP (ref 7–14)
BUN SERPL-MCNC: 10 MG/DL — SIGNIFICANT CHANGE UP (ref 7–23)
CALCIUM SERPL-MCNC: 9.4 MG/DL — SIGNIFICANT CHANGE UP (ref 8.4–10.5)
CHLORIDE SERPL-SCNC: 97 MMOL/L — LOW (ref 98–107)
CO2 SERPL-SCNC: 28 MMOL/L — SIGNIFICANT CHANGE UP (ref 22–31)
CREAT SERPL-MCNC: 0.65 MG/DL — SIGNIFICANT CHANGE UP (ref 0.5–1.3)
GLUCOSE SERPL-MCNC: 101 MG/DL — HIGH (ref 70–99)
HCT VFR BLD CALC: 44 % — SIGNIFICANT CHANGE UP (ref 34.5–45)
HGB BLD-MCNC: 14.2 G/DL — SIGNIFICANT CHANGE UP (ref 11.5–15.5)
MAGNESIUM SERPL-MCNC: 1.7 MG/DL — SIGNIFICANT CHANGE UP (ref 1.6–2.6)
MCHC RBC-ENTMCNC: 28.2 PG — SIGNIFICANT CHANGE UP (ref 27–34)
MCHC RBC-ENTMCNC: 32.3 % — SIGNIFICANT CHANGE UP (ref 32–36)
MCV RBC AUTO: 87.3 FL — SIGNIFICANT CHANGE UP (ref 80–100)
NRBC # FLD: 0 K/UL — SIGNIFICANT CHANGE UP (ref 0–0)
PHOSPHATE SERPL-MCNC: 2.9 MG/DL — SIGNIFICANT CHANGE UP (ref 2.5–4.5)
PLATELET # BLD AUTO: 226 K/UL — SIGNIFICANT CHANGE UP (ref 150–400)
PMV BLD: 10.2 FL — SIGNIFICANT CHANGE UP (ref 7–13)
POTASSIUM SERPL-MCNC: 4 MMOL/L — SIGNIFICANT CHANGE UP (ref 3.5–5.3)
POTASSIUM SERPL-SCNC: 4 MMOL/L — SIGNIFICANT CHANGE UP (ref 3.5–5.3)
RBC # BLD: 5.04 M/UL — SIGNIFICANT CHANGE UP (ref 3.8–5.2)
RBC # FLD: 15 % — HIGH (ref 10.3–14.5)
SODIUM SERPL-SCNC: 138 MMOL/L — SIGNIFICANT CHANGE UP (ref 135–145)
WBC # BLD: 8.39 K/UL — SIGNIFICANT CHANGE UP (ref 3.8–10.5)
WBC # FLD AUTO: 8.39 K/UL — SIGNIFICANT CHANGE UP (ref 3.8–10.5)

## 2020-01-24 PROCEDURE — 99233 SBSQ HOSP IP/OBS HIGH 50: CPT | Mod: GC

## 2020-01-24 PROCEDURE — 99223 1ST HOSP IP/OBS HIGH 75: CPT

## 2020-01-24 RX ADMIN — HEPARIN SODIUM 5000 UNIT(S): 5000 INJECTION INTRAVENOUS; SUBCUTANEOUS at 06:00

## 2020-01-24 RX ADMIN — CEFTRIAXONE 100 MILLIGRAM(S): 500 INJECTION, POWDER, FOR SOLUTION INTRAMUSCULAR; INTRAVENOUS at 19:05

## 2020-01-24 RX ADMIN — HEPARIN SODIUM 5000 UNIT(S): 5000 INJECTION INTRAVENOUS; SUBCUTANEOUS at 13:02

## 2020-01-24 RX ADMIN — HEPARIN SODIUM 5000 UNIT(S): 5000 INJECTION INTRAVENOUS; SUBCUTANEOUS at 22:40

## 2020-01-24 NOTE — PROGRESS NOTE ADULT - PROBLEM SELECTOR PLAN 2
- CXR w/ RUL and RML opacity likely a PNA.  No hospitalizations in the last 3 months  - RVP negative   - urine legionella - neg  - c/w ceftriaxone for 5 days (1/21 -->) likely 2/2 to CVA vs underlying dementia  -failed S+S eval   -palliative c/s to assist with GOC discussion - f/u recommendations  -pending repeat S+S eval - pt displaying signs of CVA - including dysarthria and facial droop   - CTA head and neck showed patent vessels but was limited from motion artifact  - MRI pending for CVA work-up

## 2020-01-24 NOTE — PROGRESS NOTE ADULT - PROBLEM SELECTOR PLAN 7
Transitions of Care Status:  1.  Name of PCP: no PCP  2.  PCP Contacted on Admission: [ ] Y    [ x] N    3.  PCP contacted at Discharge: [ ] Y    [ ] N    [ ] N/A  4.  Post-Discharge Appointment Date and Location:  5.  Summary of Handoff given to PCP: - DVT ppx: subq heparin  - Diet: NPO - pt failed S+S, will need to undergo GOC conversation - DVT ppx: subq heparin  - Diet: Mechanical soft   -GOC: palliative c/s to assist with GOC discussion - f/u recommendations

## 2020-01-24 NOTE — CONSULT NOTE ADULT - SUBJECTIVE AND OBJECTIVE BOX
HPI:  89yo F with poor medical follow-up, who came in for AMS. On evaluation, patient found to have dysarthria and dysphagia. She is being evaluated and managed for a stroke. The patient however has been non-compliant with workup in the hospital.    Palliative consulted for GOC discussion.    =================================================================================================  1/24/2020    - Chart reviewed. Patient seen and examined. The patient is dysarthric, but is awake, alert, and oriented x4  - I asked the patient permission to meet with her which she gave. I introduced myself and introduced Palliative Care. I asked her what she knew about her condition. She shrugged her shoulders. I asked if she had heard anything about a "stroke". She admitted that "they" have been talking about a stroke. I asked her what she knew about it, but she said she didn't know. I tried to share information w/ her about the workup but she said she did not want to talk about it ("I'm not in the mood")  - I also brought up that her Sp/Sw eval recs to advance her diet. She seemed indifferent.   - I asked her if she had any friends or family. She said she has 3 children and lives with one of them (Jarred Sanchez). I asked her who Rolanda Costello is (listed as emergency contact) and she said that Rolanda is Jarred's wife. The patient wants Jarred to be the primary point person/ surrogate. I told the patient I was going to reach out to Jarred.  - The patient did not want to talk to me further, so I asked what is important to her right now and what would she want us to do. She said she just wanted a couple of days to rest. I asked if there was anything else I could do and she said "no"  - I tried calling Rolanda and Jarred using the numbers listed, but there was no response.     =================================================================================================  ----- PERTINENT PMH/ SXH/ FHX  No pertinent past medical history    S/P cholecystectomy  No significant past surgical history    FAMILY HISTORY:  No pertinent family history in first degree relatives      ----- SOCIAL HISTORY:   Significant other/partner: Yes [ ]  No [X ] Children:  Yes [X ]  No [ ] Holiness/Spirituality:  Substance hx: Yes[ ]  No [ X]   Tobacco hx:  Yes [ ] No [X ]   Alcohol hx: Yes [ ] No [X ]   Home Opioid hx:  [ ] I-Stop Reference No:  Living Situation: [ X]Home  [ ]Long term care  [ ]Rehab [ ]Other    ----- ADVANCE DIRECTIVES:    DNR  Yes  MOLST  [ ]  Living Will  [ ]   DECISION MAKER(s):  [ ] Health Care Proxy(s)  [X ] Surrogate(s)  [ ] Guardian           Name(s): Phone Number(s):  JARRED SANCHEZ @ [phone], [phone]      ----- BASELINE (I)ADL(s) (prior to admission):  Imperial: [ ]Total  [ ] Moderate [ ]Dependent  Palliative Performance Status Version 2:               =================================================================================================  -----MEDICATIONS AND ALLERGIES:  Allergies    No Known Allergies    Intolerances    MEDICATIONS  (STANDING):  cefTRIAXone   IVPB 1000 milliGRAM(s) IV Intermittent every 24 hours  dextrose 5%. 1000 milliLiter(s) (50 mL/Hr) IV Continuous <Continuous>  dextrose 50% Injectable 12.5 Gram(s) IV Push once  dextrose 50% Injectable 25 Gram(s) IV Push once  dextrose 50% Injectable 25 Gram(s) IV Push once  heparin  Injectable 5000 Unit(s) SubCutaneous every 8 hours    MEDICATIONS  (PRN):  acetaminophen  Suppository .. 650 milliGRAM(s) Rectal every 6 hours PRN Temp greater or equal to 38C (100.4F)  albuterol/ipratropium for Nebulization. 3 milliLiter(s) Nebulizer every 4 hours PRN Shortness of Breath and/or Wheezing  dextrose 40% Gel 15 Gram(s) Oral once PRN Blood Glucose LESS THAN 70 milliGRAM(s)/deciliter  glucagon  Injectable 1 milliGRAM(s) IntraMuscular once PRN Glucose LESS THAN 70 milligrams/deciliter        =================================================================================================  ----- SYMPTOM ASSESSMENT: [ ]Unable to obtain due to poor mentation   Source if other than patient:  [ ]Family   [ ]Team     Pain (Impact on QOL):    Location -   Severity -        Minimal acceptable level (0-10 scale):  Quality:   Onset:   Duration:                 Aggravating factors -  Relieving factors -  Radiation -    PAIN AD Score:     Dyspnea:  Yes [ ] No [ ] - [ ]Mild [ ]Moderate [ ]Severe  Anxiety:    Yes [ ] No [ ] - [ ]Mild [ ]Moderate [ ]Severe  Fatigue:    Yes [ ] No [ ] - [ ]Mild [ ]Moderate [ ]Severe  Nausea:    Yes [ ] No [ ] - [ ]Mild [ ]Moderate [ ]Severe                         Loss of appetite: Yes [ ] No [ ] - [ ]Mild [ ]Moderate [ ]Severe             Constipation:  Yes [ ] No [ ] - [ ]Mild [ ]Moderate [ ]Severe  Grief:  Yes [ ] No [ ]     Other Symptoms:  [X ]All other review of systems negative       =================================================================================================  ----- PHYSICAL EXAM:  Vital Signs Last 24 Hrs  T(C): 36.8 (24 Jan 2020 09:52), Max: 36.9 (24 Jan 2020 05:57)  T(F): 98.3 (24 Jan 2020 09:52), Max: 98.5 (24 Jan 2020 05:57)  HR: 73 (24 Jan 2020 09:52) (67 - 84)  BP: 173/85 (24 Jan 2020 09:52) (168/74 - 195/70)  BP(mean): --  RR: 16 (24 Jan 2020 09:52) (16 - 20)  SpO2: 94% (24 Jan 2020 09:52) (90% - 96%) I&O's Summary    23 Jan 2020 07:01  -  24 Jan 2020 07:00  --------------------------------------------------------  IN: 350 mL / OUT: 0 mL / NET: 350 mL    GEN: Awake, alert, NAD  HEENT: +Dysarthric speech  PULM: Comfortable work of breathing, clear BS  CV: RRR, normal S1 and S2, no murmurs, Regular pulse  ABD: Soft, non-tender, +BS  EXT: No edema  PSYCH: Appropriate mood and affect  NEURO: +Dysarthria, +Word finding, No tremors  SKIN: No rashes or lesions on exposed skin, No jaundice      =================================================================================================  ----- LABS:                        14.2   8.39  )-----------( 226      ( 24 Jan 2020 13:05 )             44.0   01-24    138  |  97<L>  |  10  ----------------------------<  101<H>  4.0   |  28  |  0.65    Ca    9.4      24 Jan 2020 13:05  Phos  2.9     01-24  Mg     1.7     01-24          -----RADIOLOGY & ADDITIONAL STUDIES:    PROTEIN CALORIE MALNUTRITION PRESENT: [ ] Yes [ ] No  [ ] PPSV2 < or = to 30% [ ] significant weight loss  [ ] poor nutritional intake [ ] catabolic state [ ] anasarca     Albumin, Serum: 3.0 g/dL (01-22-20 @ 06:20)      REFERRALS:   [ ]Chaplaincy  [ ] Hospice  [ ]Child Life  [ ]Social Work  [ ]Case management [ ]Holistic Therapy   Goals of Care Discussion Document:

## 2020-01-24 NOTE — PROGRESS NOTE ADULT - PROBLEM SELECTOR PLAN 1
- likely 2/2 metabolic encephalopathy from infectious etiology; CXR w/ hazy RUL and RML opacity and U/A positive for UTI  - CTA head and neck showed patent vessels but was limited from motion artifact  - blood cx: NGTD   - mental status now improved but pt displaying signs of possible CVA - likely 2/2 metabolic encephalopathy from infectious etiology vs underlying dementia  - CXR w/ hazy RUL and RML opacity and U/A positive for UTI  - CTA head and neck showed patent vessels but was limited from motion artifact  - blood cx: NGTD   - mental status now improved but pt displaying signs of possible CVA

## 2020-01-24 NOTE — PROGRESS NOTE ADULT - PROBLEM SELECTOR PLAN 3
- U/A positive for UTI  - urine culture: proteus   - s/p ceftriaxone for 3 day course - pt displaying signs of CVA - including dysarthria and facial droop   - CTA head and neck showed patent vessels but was limited from motion artifact  - MRI pending for CVA work-up - CXR w/ RUL and RML opacity likely a PNA.  No hospitalizations in the last 3 months  - RVP negative   - urine legionella - neg  - c/w ceftriaxone for 5 days (1/21 -->)

## 2020-01-24 NOTE — PROGRESS NOTE ADULT - ATTENDING COMMENTS
89 y/o female, NO PCP, on ASA at home for primary prevention (no clear indication), HX of Fall 4 years ago, wheelchair Bound, pt is A+O x 3 at baseline, lives with family and HHA, admitted for metabolic encephalopathy 2/2 CAP, UTI, hyponatremia and ? CVA  Baseline A&Ox4, improved pulmonary exam  Passed swallow evaluation, palliative consulted  DNR/DNI, will need GOC, planning to discuss with family this afternoon about obtaining MRI/stroke w/u, CT angio of head/neck was limited exam due to motion artifact, no significant stenosis of the extracranial carotid arteries or vertebral arteries identified.  Continue IV Ceftriaxone Day 4/5, urine cx proteus 50-99K CFU, sensitivities reviewed, blood cx NTD  s/p 20 IV lasix, euvolemic on my exam today, F/U TTE, TTE 3/2016 with diastolic dysfunction, hyperdynamic LV EF 80-85%  Fall/aspiration precaution, PT consulted

## 2020-01-24 NOTE — SWALLOW BEDSIDE ASSESSMENT ADULT - COMMENTS
Patient is a "89 yo F with no significant PMHx admitted for AMS 2/2 metabolic encephalopathy likely from CAP and UTI". CT Angio of Brain reported "Limited exam due to motion artifact. No significant stenosis of the extracranial carotid arteries or vertebral arteries identified. Evaluation of the intracranial circulation is significantly limited".     Patient was seen upright at bedside with HHA present. Patient was alert/awake with some vocalizations/verbalizations noted. Patient unable to follow simple directions. HHA reports patient consuming chopped/ regular solids and thin liquids prior to hospitalization.
Medicine Note 1/24/2020 - 89 yo F with no significant PMHx admitted for AMS 2/2 metabolic encephalopathy likely from CAP and UTI, now pending CVA work-up.    Of Note: Patient was seen for a Clinical Swallow Eval on 1/23/2020 (See Consult).     Clinical Swallow Re-Evaluation ordered. Patient seen at bedside, alert and oriented x2. Patient is able to follow simple commands and express simple needs. Patient's speech is dysarthric/garble speech output impacting on speech intelligibility. Patient's HHA is present and has been caring for patient for four years. Patient's baseline diet at home is Regular with Thin Liquids per HHA report.  Patient does not wear dentures as per HHA.

## 2020-01-24 NOTE — CONSULT NOTE ADULT - PROBLEM SELECTOR RECOMMENDATION 6
.  # Code: DNR, DNI  # HCP: Jarred Gill is surrogate @ [phone], [phone]    > Patient is DNR, DNI. Surrogate named as Jarred Gill (son)  > Patient is aware workup is to r/o a stroke. Workup pending. Consider repeat CT if patient does not want to lie still for MRI.   > Tried to reach out to Jarred Gill, w/ no response  > At this point, the patient seems to be at her baseline mental status. She is wheelchair-bound at baseline. As per Sp/Sw, patient is on [facility] soft diet + aspiration precautions. She is DNR, DNI.   > Aside from speaking with patient and family about an MRI, let us know how Palliative Medicine can be helpful to this patient's care.

## 2020-01-24 NOTE — PROGRESS NOTE ADULT - SUBJECTIVE AND OBJECTIVE BOX
Donna Smith MD   [facility] Medicine  Pager [phone]      PROGRESS NOTE:     Patient is a 89y old  Female who presents with a chief complaint of AMS (23 Jan 2020 14:12)      SUBJECTIVE / OVERNIGHT EVENTS:    Pt received 10 mg IV hydralazine overnight for BP elevated to 195/70. Patient examined at bedside with no acute complaints. Pt reports feeling well.     Denies SOB, chest pain, nausea, vomiting, diarrhea, constipation.    MEDICATIONS  (STANDING):  cefTRIAXone   IVPB 1000 milliGRAM(s) IV Intermittent every 24 hours  dextrose 5%. 1000 milliLiter(s) (50 mL/Hr) IV Continuous <Continuous>  dextrose 50% Injectable 12.5 Gram(s) IV Push once  dextrose 50% Injectable 25 Gram(s) IV Push once  dextrose 50% Injectable 25 Gram(s) IV Push once  heparin  Injectable 5000 Unit(s) SubCutaneous every 8 hours    MEDICATIONS  (PRN):  acetaminophen  Suppository .. 650 milliGRAM(s) Rectal every 6 hours PRN Temp greater or equal to 38C (100.4F)  albuterol/ipratropium for Nebulization. 3 milliLiter(s) Nebulizer every 4 hours PRN Shortness of Breath and/or Wheezing  dextrose 40% Gel 15 Gram(s) Oral once PRN Blood Glucose LESS THAN 70 milliGRAM(s)/deciliter  glucagon  Injectable 1 milliGRAM(s) IntraMuscular once PRN Glucose LESS THAN 70 milligrams/deciliter      CAPILLARY BLOOD GLUCOSE      POCT Blood Glucose.: 98 mg/dL (24 Jan 2020 05:50)  POCT Blood Glucose.: 95 mg/dL (23 Jan 2020 23:33)  POCT Blood Glucose.: 99 mg/dL (23 Jan 2020 17:27)  POCT Blood Glucose.: 113 mg/dL (23 Jan 2020 12:19)    I&O's Summary    23 Jan 2020 07:01  -  24 Jan 2020 07:00  --------------------------------------------------------  IN: 350 mL / OUT: 0 mL / NET: 350 mL        PHYSICAL EXAM:  Vital Signs Last 24 Hrs  T(C): 36.9 (24 Jan 2020 05:57), Max: 36.9 (24 Jan 2020 05:57)  T(F): 98.5 (24 Jan 2020 05:57), Max: 98.5 (24 Jan 2020 05:57)  HR: 84 (24 Jan 2020 05:57) (64 - 84)  BP: 179/80 (24 Jan 2020 05:57) (162/72 - 195/70)  BP(mean): --  RR: 16 (24 Jan 2020 05:57) (16 - 20)  SpO2: 93% (24 Jan 2020 05:57) (90% - 99%)    CONSTITUTIONAL: NAD, disheveled elderly female  RESPIRATORY: Normal respiratory effort; lungs are clear to auscultation bilaterally  CARDIOVASCULAR: Regular rate and rhythm, normal S1 and S2, no murmur/rub/gallop; No lower extremity edema; Peripheral pulses are 2+ bilaterally  ABDOMEN: Nontender to palpation, normoactive bowel sounds, no rebound/guarding; No hepatosplenomegaly  MUSCLOSKELETAL: no clubbing or cyanosis of digits; no joint swelling or tenderness to palpation  PSYCH: A+O x3, with dysarthric speech    LABS:                        12.9   7.71  )-----------( 211      ( 23 Jan 2020 07:30 )             41.4     01-23    130<L>  |  95<L>  |  11  ----------------------------<  97  4.0   |  27  |  0.76    Ca    9.1      23 Jan 2020 07:30  Phos  3.0     01-23  Mg     1.9     01-23                Culture - Urine (collected 21 Jan 2020 17:46)  Source: URINE MIDSTREAM  Final Report (23 Jan 2020 10:53):    COLONY COUNT: 50,000-99,000 CFU/mL  Organism: Proteus mirabilis (23 Jan 2020 10:53)  Organism: Proteus mirabilis (23 Jan 2020 10:53)    Culture - Blood (collected 21 Jan 2020 16:59)  Source: BLOOD VENOUS  Preliminary Report (23 Jan 2020 16:59):    NO ORGANISMS ISOLATED    NO ORGANISMS ISOLATED AT 48 HRS.    Culture - Blood (collected 21 Jan 2020 16:59)  Source: BLOOD PERIPHERAL  Preliminary Report (23 Jan 2020 16:59):    NO ORGANISMS ISOLATED    NO ORGANISMS ISOLATED AT 48 HRS.        RADIOLOGY & ADDITIONAL TESTS:  Results Reviewed:   Imaging Personally Reviewed:  Electrocardiogram Personally Reviewed:    COORDINATION OF CARE:  Care Discussed with Consultants/Other Providers [Y/N]:  Prior or Outpatient Records Reviewed [Y/N]:

## 2020-01-24 NOTE — CONSULT NOTE ADULT - ASSESSMENT
89yo F with poor medical follow-up, who came in for AMS. On evaluation, patient found to have dysarthria and dysphagia. She is being evaluated and managed for a stroke. The patient however has been non-compliant with workup in the hospital.    Palliative consulted for GOC discussion.

## 2020-01-24 NOTE — PROGRESS NOTE ADULT - ASSESSMENT
89 yo F with no significant PMHx admitted for AMS 2/2 metabolic encephalopathy likely from CAP and UTI, now pending CVA work-up.

## 2020-01-24 NOTE — PROGRESS NOTE ADULT - PROBLEM SELECTOR PLAN 4
- pt displaying signs of CVA - including dysarthria and facial droop   - CTA head and neck showed patent vessels but was limited from motion artifact  - MRI pending for CVA work-up - CXR w/ RUL and RML opacity likely a PNA.  No hospitalizations in the last 3 months  - RVP negative   - urine legionella - neg  - c/w ceftriaxone for 5 days (1/21 -->) - U/A positive for UTI  - urine culture: proteus   - s/p ceftriaxone for 3 day course

## 2020-01-24 NOTE — CONSULT NOTE ADULT - PROBLEM SELECTOR RECOMMENDATION 7
Thank you for allowing us to participate in your patient's care. We will follow up next week. Please page [phone] for any q's or c's.     Gagan Lara  Palliative Medicine

## 2020-01-24 NOTE — PROGRESS NOTE ADULT - PROBLEM SELECTOR PLAN 6
- DVT ppx: subq heparin  - Diet: NPO - pt failed S+S, will need to undergo GOC conversation - likely in setting of poor PO intake   - received 1L NS bolus on arrival   - urine osm 381, serum osm 277 consistent with SIADH picture   - NA uptrending  - trend NA

## 2020-01-24 NOTE — SWALLOW BEDSIDE ASSESSMENT ADULT - SWALLOW EVAL: DIAGNOSIS
Patient present with OroPharyngeal Stage Dysphagia characterized by adequate oral containment, slow/prolonged mashing/gumming for solid consistency, slow bolus manipulation and slow transfer for solid consistency; adequate bolus manipulation and transfer for puree. There is laryngeal elevation upon palpation, initiation of the pharyngeal swallow,  Patient exhibited coughing post swallow for solid consistency only which may have been exacerbated by the texture/consistency and oral stage deficit component. There were no overt signs of impaired airway protection for puree/nectar thick liquids/thin liquids via cup sip self administered. Patient present with OroPharyngeal Stage Dysphagia characterized by adequate oral containment with no anterior spillage via cup sip self administered, slow/prolonged mashing/gumming for solid consistency, slow bolus manipulation and slow transfer for solid consistency; adequate bolus manipulation and transfer for puree. There is laryngeal elevation upon palpation, initiation of the pharyngeal swallow,  Patient exhibited coughing post swallow for solid consistency only which may have been exacerbated by the texture/consistency and oral stage deficit component. There were no overt signs of impaired airway protection for puree/nectar thick liquids/thin liquids via cup sip self administered.

## 2020-01-24 NOTE — SWALLOW BEDSIDE ASSESSMENT ADULT - ADDITIONAL RECOMMENDATIONS
Oral Care to reduce colonization of bacteria in oral cavity
Monitor for PO tolerance/intake. Monitor for any evolving/progressive neurological changes given stroke work-up.

## 2020-01-24 NOTE — PROGRESS NOTE ADULT - PROBLEM SELECTOR PLAN 5
- likely in setting of poor PO intake   - received 1L NS bolus on arrival   - urine osm 381, serum osm 277 consistent with SIADH picture   - NA uptrending  - trend NA - U/A positive for UTI  - urine culture: proteus   - s/p ceftriaxone for 3 day course likely 2/2 to CVA vs underlying dementia  -failed S+S eval   -pt passed repeat S+S eval 1/24 - c/w mechanical soft diet

## 2020-01-25 PROCEDURE — 99232 SBSQ HOSP IP/OBS MODERATE 35: CPT | Mod: GC

## 2020-01-25 RX ADMIN — CEFTRIAXONE 100 MILLIGRAM(S): 500 INJECTION, POWDER, FOR SOLUTION INTRAMUSCULAR; INTRAVENOUS at 19:58

## 2020-01-25 RX ADMIN — HEPARIN SODIUM 5000 UNIT(S): 5000 INJECTION INTRAVENOUS; SUBCUTANEOUS at 14:26

## 2020-01-25 RX ADMIN — HEPARIN SODIUM 5000 UNIT(S): 5000 INJECTION INTRAVENOUS; SUBCUTANEOUS at 21:51

## 2020-01-25 NOTE — PROGRESS NOTE ADULT - PROBLEM SELECTOR PLAN 1
Likely 2/2 metabolic encephalopathy from infectious etiology vs neurological event, CVA. Now improved back to baseline AAOx3.  - CXR w/ hazy RUL and RML opacity and U/A positive for UTI  - CTA head and neck showed patent vessels but was limited from motion artifact  - blood cx: NGTD

## 2020-01-25 NOTE — PROGRESS NOTE ADULT - ATTENDING COMMENTS
89 y/o female, NO PCP, on ASA at home for primary prevention (no clear indication), HX of Fall 4 years ago, wheelchair Bound, pt is A+O x 3 at baseline, lives with family and HHA, admitted for metabolic encephalopathy 2/2 CAP, UTI, hyponatremia and ? CVA  Baseline A&Ox4, mental status improved.  Passed swallow evaluation, palliative consult appreciated.  DNR/DNI, patient refusing MRI, CT angio of head/neck was limited exam due to motion artifact, no significant stenosis of the extracranial carotid arteries or vertebral arteries identified  Continue IV Ceftriaxone Day 5/5, urine cx proteus 50-99K CFU, sensitivities reviewed, blood cx NTD  s/p 20 IV lasix, euvolemic on my exam today, F/U TTE, TTE 3/2016 with diastolic dysfunction, hyperdynamic LV EF 80-85%  Fall/aspiration precaution, no restorative function, bed-bound at baseline.     I spent 40 minutes coordinating this patient's discharge, reviewed medications.

## 2020-01-25 NOTE — PROGRESS NOTE ADULT - PROBLEM SELECTOR PLAN 2
Pt displaying signs of CVA including dysarthria and facial droop with some improvement of dysarthria. CTA head and neck showed patent vessels but was limited from motion artifact  - Pt refused MRI for CVA workup.

## 2020-01-25 NOTE — PROGRESS NOTE ADULT - PROBLEM SELECTOR PLAN 6
Likely in setting of poor PO intake. Urine osm 381, serum osm 277 consistent with SIADH picture. Na uptrending  - trend Na

## 2020-01-25 NOTE — PROGRESS NOTE ADULT - SUBJECTIVE AND OBJECTIVE BOX
Kelsey Haro MD  PGY3, Internal Medicine  Care Model B  [id number], [place] [id number]    Patient is a 89y old  Female who presents with a chief complaint of AMS (24 Jan 2020 14:49)     INTERVAL HPI/OVERNIGHT EVENTS:  Pt refusing AM labs and VS. No other events overnight. No complaints.    MEDICATIONS  (STANDING):  cefTRIAXone   IVPB 1000 milliGRAM(s) IV Intermittent every 24 hours  heparin  Injectable 5000 Unit(s) SubCutaneous every 8 hours    MEDICATIONS  (PRN):  acetaminophen  Suppository .. 650 milliGRAM(s) Rectal every 6 hours PRN Temp greater or equal to 38C (100.4F)  albuterol/ipratropium for Nebulization. 3 milliLiter(s) Nebulizer every 4 hours PRN Shortness of Breath and/or Wheezing    Allergies:  No Known Allergies    Intolerances:      REVIEW OF SYSTEMS:  Constitutional: Denies fever, weight loss  Resp: Denies SOB, cough  CV: Denies chest pain, palpitations  GI: Denies abdominal pain, N/V/D/C  : Denies dysuria, increased frequency  Neuro: Denies HA, weakness, numbness  Skin: Denies rashes, lesions  Lymph Nodes: Denies enlarged glands  MSK: Denies joint pain, swelling    VITAL SIGNS:  T(C): 36.3 (01-25-20 @ 01:03), Max: 36.8 (01-24-20 @ 15:29)  HR: 70 (01-25-20 @ 01:03) (70 - 94)  BP: 176/69 (01-25-20 @ 01:03) (149/65 - 176/69)  RR: 18 (01-25-20 @ 01:03) (17 - 18)  SpO2: 94% (01-25-20 @ 01:03) (94% - 95%)    PHYSICAL EXAM:  General: NAD, well-groomed, well-developed  Eyes: Conjunctiva and sclera clear  ENMT: Moist mucous membranes  Neck: Supple  Chest: Clear to auscultation bilaterally; no rales, rhonchi, or wheezing  Heart: Regular rate and rhythm; no murmurs, rubs, or gallops  Abd: +BS, soft, nontender, nondistended  Nervous System: AAOX3, some dysarthria  Psych: Appropriate affect and mood  Ext:  no LE edema    LABS:                        14.2   8.39  )-----------( 226      ( 24 Jan 2020 13:05 )             44.0     24 Jan 2020 13:05    138    |  97     |  10     ----------------------------<  101    4.0     |  28     |  0.65     Ca    9.4        24 Jan 2020 13:05  Phos  2.9       24 Jan 2020 13:05  Mg     1.7       24 Jan 2020 13:05      CAPILLARY BLOOD GLUCOSE  POCT Blood Glucose.: 119 mg/dL (24 Jan 2020 17:58)  POCT Blood Glucose.: 103 mg/dL (24 Jan 2020 12:35)    BLOOD CULTURE  01-21 @ 17:46 --  --  Proteus mirabilis  01-21 @ 16:59 --    NO ORGANISMS ISOLATED  NO ORGANISMS ISOLATED AT 72 HRS.  --    RADIOLOGY & ADDITIONAL TESTS:    Imaging Personally Reviewed:  [ ] YES     Consultant(s) Notes Reviewed:  Speech and Swallow, Palliative    Care Discussed with Consultants/Other Providers:

## 2020-01-25 NOTE — PROGRESS NOTE ADULT - PROBLEM SELECTOR PLAN 5
Likely 2/2 to CVA vs underlying dementia. Failed initial S+S eval but passed repeat eval.   - C/w mechanical soft diet

## 2020-01-25 NOTE — PROGRESS NOTE ADULT - PROBLEM SELECTOR PLAN 3
- CXR w/ RUL and RML opacity likely a PNA.  No hospitalizations in the last 3 months. RVP, urine legionella negative  - c/w ceftriaxone for 5 days (1/21 -->)

## 2020-01-26 ENCOUNTER — TRANSCRIPTION ENCOUNTER (OUTPATIENT)
Age: 85
End: 2020-01-26

## 2020-01-26 VITALS — OXYGEN SATURATION: 95 % | RESPIRATION RATE: 18 BRPM | HEART RATE: 68 BPM | TEMPERATURE: 98 F

## 2020-01-26 LAB
BACTERIA BLD CULT: SIGNIFICANT CHANGE UP
BACTERIA BLD CULT: SIGNIFICANT CHANGE UP

## 2020-01-26 PROCEDURE — 99239 HOSP IP/OBS DSCHRG MGMT >30: CPT | Mod: GC

## 2020-01-26 RX ADMIN — HEPARIN SODIUM 5000 UNIT(S): 5000 INJECTION INTRAVENOUS; SUBCUTANEOUS at 05:26

## 2020-01-26 RX ADMIN — HEPARIN SODIUM 5000 UNIT(S): 5000 INJECTION INTRAVENOUS; SUBCUTANEOUS at 14:35

## 2020-01-26 NOTE — PROGRESS NOTE ADULT - SUBJECTIVE AND OBJECTIVE BOX
Donna Smith MD   [facility] Medicine  Pager [phone]      PROGRESS NOTE:     Patient is a 89y old  Female who presents with a chief complaint of AMS (25 Jan 2020 10:49)      SUBJECTIVE / OVERNIGHT EVENTS:    No acute events overnight. Patient examined at bedside with no acute complaints.     Denies SOB, chest pain, nausea, vomiting, diarrhea, constipation.    MEDICATIONS  (STANDING):  cefTRIAXone   IVPB 1000 milliGRAM(s) IV Intermittent every 24 hours  heparin  Injectable 5000 Unit(s) SubCutaneous every 8 hours    MEDICATIONS  (PRN):  acetaminophen  Suppository .. 650 milliGRAM(s) Rectal every 6 hours PRN Temp greater or equal to 38C (100.4F)  albuterol/ipratropium for Nebulization. 3 milliLiter(s) Nebulizer every 4 hours PRN Shortness of Breath and/or Wheezing      CAPILLARY BLOOD GLUCOSE        I&O's Summary    25 Jan 2020 07:01  -  26 Jan 2020 07:00  --------------------------------------------------------  IN: 450 mL / OUT: 0 mL / NET: 450 mL        PHYSICAL EXAM:  Vital Signs Last 24 Hrs  T(C): 36.9 (26 Jan 2020 05:18), Max: 37.5 (25 Jan 2020 16:00)  T(F): 98.5 (26 Jan 2020 05:18), Max: 99.5 (25 Jan 2020 16:00)  HR: 68 (26 Jan 2020 05:18) (68 - 72)  BP: 159/78 (25 Jan 2020 16:00) (159/78 - 159/78)  BP(mean): --  RR: 18 (26 Jan 2020 05:18) (18 - 18)  SpO2: 95% (26 Jan 2020 05:18) (94% - 95%)    CONSTITUTIONAL: NAD, disheveled elderly female  RESPIRATORY: Normal respiratory effort; lungs are clear to auscultation bilaterally  CARDIOVASCULAR: Regular rate and rhythm, normal S1 and S2, no murmur/rub/gallop; No lower extremity edema; Peripheral pulses are 2+ bilaterally  ABDOMEN: Nontender to palpation, normoactive bowel sounds, no rebound/guarding; No hepatosplenomegaly  MUSCLOSKELETAL: no clubbing or cyanosis of digits; no joint swelling or tenderness to palpation  PSYCH: A+O x3, with dysarthric speech, R facial droop    LABS:                        14.2   8.39  )-----------( 226      ( 24 Jan 2020 13:05 )             44.0     01-24    138  |  97<L>  |  10  ----------------------------<  101<H>  4.0   |  28  |  0.65    Ca    9.4      24 Jan 2020 13:05  Phos  2.9     01-24  Mg     1.7     01-24                  RADIOLOGY & ADDITIONAL TESTS:  Results Reviewed:   Imaging Personally Reviewed:  Electrocardiogram Personally Reviewed:    COORDINATION OF CARE:  Care Discussed with Consultants/Other Providers [Y/N]:  Prior or Outpatient Records Reviewed [Y/N]:

## 2020-01-26 NOTE — DISCHARGE NOTE NURSING/CASE MANAGEMENT/SOCIAL WORK - NSDCPNINST_GEN_ALL_CORE
pt Aox3, uncooperative at times, refusing vitals to be taken. pt denies pain. No signs of acute distress noted. pt is complete care, set up with meals. incontinent of urine/ stool basa ointment applied. IV removed. copy of discharge instruction provided. private aide with patient

## 2020-01-26 NOTE — PROGRESS NOTE ADULT - ATTENDING COMMENTS
89 y/o female, NO PCP, on ASA at home for primary prevention (no clear indication), HX of Fall 4 years ago, wheelchair Bound, pt is A+O x 3 at baseline, lives with family and HHA, admitted for metabolic encephalopathy 2/2 CAP, UTI, hyponatremia and ? CVA  Baseline A&Ox4, mental status improved.  Passed swallow evaluation, palliative consult appreciated.  DNR/DNI, patient refusing MRI, CT angio of head/neck was limited exam due to motion artifact, no significant stenosis of the extracranial carotid arteries or vertebral arteries identified  s/p 5 days of IV Ceftriaxone, urine cx proteus 50-99K CFU, sensitivities reviewed, blood cx NTD  s/p 20 IV lasix, euvolemic  today, F/U TTE, can be performed as OP, TTE 3/2016 with diastolic dysfunction, hyperdynamic LV EF 80-85%  Fall/aspiration precaution, no restorative function, bed-bound at baseline.     I spent 40 minutes coordinating this patient's discharge, reviewed medications.

## 2020-01-26 NOTE — PROGRESS NOTE ADULT - ASSESSMENT
89 yo F with no significant PMHx admitted for AMS 2/2 metabolic encephalopathy likely from CAP and UTI, now pending CVA work-up.

## 2020-01-26 NOTE — DISCHARGE NOTE NURSING/CASE MANAGEMENT/SOCIAL WORK - PATIENT PORTAL LINK FT
You can access the FollowMyHealth Patient Portal offered by [facility] by registering at the following website: [email or web address]. By joining [facility]’s FollowMyHealth portal, you will also be able to view your health information using other applications (apps) compatible with our system.

## 2020-02-06 ENCOUNTER — APPOINTMENT (OUTPATIENT)
Dept: CARE COORDINATION | Facility: HOME HEALTH | Age: 85
End: 2020-02-06
Payer: COMMERCIAL

## 2020-02-06 VITALS
HEART RATE: 54 BPM | TEMPERATURE: 96 F | OXYGEN SATURATION: 95 % | SYSTOLIC BLOOD PRESSURE: 140 MMHG | RESPIRATION RATE: 18 BRPM | DIASTOLIC BLOOD PRESSURE: 60 MMHG

## 2020-02-06 DIAGNOSIS — R32 UNSPECIFIED URINARY INCONTINENCE: ICD-10-CM

## 2020-02-06 DIAGNOSIS — Z71.89 OTHER SPECIFIED COUNSELING: ICD-10-CM

## 2020-02-06 DIAGNOSIS — K59.00 CONSTIPATION, UNSPECIFIED: ICD-10-CM

## 2020-02-06 DIAGNOSIS — H91.90 UNSPECIFIED HEARING LOSS, UNSPECIFIED EAR: ICD-10-CM

## 2020-02-06 DIAGNOSIS — K59.00 CONSTIPATION, UNSPECIFIED: Chronic | ICD-10-CM

## 2020-02-06 PROCEDURE — 99349 HOME/RES VST EST MOD MDM 40: CPT

## 2020-02-07 PROBLEM — H91.90 HEARING LOSS: Status: ACTIVE | Noted: 2020-02-07

## 2020-02-07 PROBLEM — R32 URINARY INCONTINENCE: Status: ACTIVE | Noted: 2020-02-06

## 2020-02-07 PROBLEM — K59.00 CONSTIPATION: Status: ACTIVE | Noted: 2020-02-06

## 2020-02-07 PROBLEM — Z71.89 GOALS OF CARE, COUNSELING/DISCUSSION: Status: ACTIVE | Noted: 2020-02-06

## 2020-02-07 NOTE — HISTORY OF PRESENT ILLNESS
[Family Member] : family member [FreeTextEntry1] : "Follow up hospitalzation fro UTI, CVA/PNA" Pt is A&O x 2-3, homebound ^ limited endurance and weakness. c/o constipation since d/c from [facility] approx 10 days ago. Pt denies pain, N/V/D, reported with good appetite, + flatus, is more sedentary. wheelchair bound.  Pt is incontinent of bladder, recently treated for UTI during hospital admission. Denies dysuria, maloder, change in urine color/ discharge.  Also c/o worsening hearing loss with hx impacted cerumen, denies ringing, burning, drainage, pain.  [de-identified] : 89 yo F who hasn't seen a doctor in years and is on only baby ASA at the recommendation of her daughter in law is presenting from home w/ AMS x 1 day. At baseline pt is wheelchair bound and AAO x 3; 4 years ago she had a fall and has been wheelchair bound since and has been living w/ her son and his family. They have a HHA that noticed that pt was communicating less since this morning and not fully responding verbally to commands. Aide and family states that this is the first occurrence of these symptoms. Brought to ED for further evaluation. \par HOSPITAL COURSE\par On presentation, the patient was hemodynamically stable. Labs were significant for hyponatremia and positive urinalysis. Imaging was significant for a RUL and RML opacity consistent with pneumonia. RVP and urine legionella were negative. CTA head and neck showed patent vessels but was limited from motion artifact Patient was started on ceftriaxone in order to provide coverage for both the pneumonia and UTI. Blood cultures remained negative. On arrival patient was A+Ox0 but after receiving antibiotics patient's mental status improved back to her baseline of A+Ox3. Patient completed antibiotics for pneumonia and UTI. Patient's sodium improved. Of note, patient displayed drooped mouth and dysarthric speech concerning for possible stroke. It was recommended that the patient get an MRI brain, but based on discussion with patient/family, it was decided to defer further imaging at this time. \par Discharge plan discussed with patient. Patient expresses understanding and agreement. Patient is hemodynamically stable at the time of discharge. Patient should follow-up with their PCP within 1 week of discharge for further monitoring.\par

## 2020-02-07 NOTE — PHYSICAL EXAM
[No Acute Distress] : no acute distress [PERRL] : pupils equal round and reactive to light [No Respiratory Distress] : no respiratory distress  [Supple] : supple [Clear to Auscultation] : lungs were clear to auscultation bilaterally [No Accessory Muscle Use] : no accessory muscle use [Normal S1, S2] : normal S1 and S2 [Regular Rhythm] : with a regular rhythm [Non Tender] : non-tender [Soft] : abdomen soft [Non-distended] : non-distended [Normal Bowel Sounds] : normal bowel sounds [No Focal Deficits] : no focal deficits [de-identified] : 2/6 murmur [de-identified] : A&O x 2

## 2020-02-13 ENCOUNTER — NON-APPOINTMENT (OUTPATIENT)
Age: 85
End: 2020-02-13

## 2020-02-28 ENCOUNTER — NON-APPOINTMENT (OUTPATIENT)
Age: 85
End: 2020-02-28

## 2020-10-23 ENCOUNTER — INPATIENT (INPATIENT)
Facility: HOSPITAL | Age: 85
LOS: 7 days | Discharge: HOSPICE | End: 2020-10-31
Attending: HOSPITALIST | Admitting: HOSPITALIST
Payer: COMMERCIAL

## 2020-10-23 VITALS — HEIGHT: 65 IN

## 2020-10-23 DIAGNOSIS — Z90.49 ACQUIRED ABSENCE OF OTHER SPECIFIED PARTS OF DIGESTIVE TRACT: Chronic | ICD-10-CM

## 2020-10-23 LAB
ALBUMIN SERPL ELPH-MCNC: 3.5 G/DL — SIGNIFICANT CHANGE UP (ref 3.3–5)
ALP SERPL-CCNC: 105 U/L — SIGNIFICANT CHANGE UP (ref 40–120)
ALT FLD-CCNC: 18 U/L — SIGNIFICANT CHANGE UP (ref 4–33)
ANION GAP SERPL CALC-SCNC: 5 MMO/L — LOW (ref 7–14)
ANION GAP SERPL CALC-SCNC: 7 MMO/L — SIGNIFICANT CHANGE UP (ref 7–14)
APPEARANCE UR: CLEAR — SIGNIFICANT CHANGE UP
APTT BLD: 30 SEC — SIGNIFICANT CHANGE UP (ref 27–36.3)
AST SERPL-CCNC: 21 U/L — SIGNIFICANT CHANGE UP (ref 4–32)
BACTERIA # UR AUTO: HIGH
BASE EXCESS BLDA CALC-SCNC: 4.9 MMOL/L — SIGNIFICANT CHANGE UP
BASE EXCESS BLDA CALC-SCNC: 5.2 MMOL/L — SIGNIFICANT CHANGE UP
BASE EXCESS BLDV CALC-SCNC: 4.7 MMOL/L — SIGNIFICANT CHANGE UP
BASOPHILS # BLD AUTO: 0.03 K/UL — SIGNIFICANT CHANGE UP (ref 0–0.2)
BASOPHILS NFR BLD AUTO: 0.2 % — SIGNIFICANT CHANGE UP (ref 0–2)
BILIRUB SERPL-MCNC: 0.6 MG/DL — SIGNIFICANT CHANGE UP (ref 0.2–1.2)
BILIRUB UR-MCNC: NEGATIVE — SIGNIFICANT CHANGE UP
BLOOD GAS ARTERIAL - FIO2: 21 — SIGNIFICANT CHANGE UP
BLOOD GAS ARTERIAL - FIO2: 40 — SIGNIFICANT CHANGE UP
BLOOD GAS VENOUS - CREATININE: 0.81 MG/DL — SIGNIFICANT CHANGE UP (ref 0.5–1.3)
BLOOD GAS VENOUS - FIO2: 21 — SIGNIFICANT CHANGE UP
BLOOD UR QL VISUAL: HIGH
BUN SERPL-MCNC: 21 MG/DL — SIGNIFICANT CHANGE UP (ref 7–23)
BUN SERPL-MCNC: 21 MG/DL — SIGNIFICANT CHANGE UP (ref 7–23)
CALCIUM SERPL-MCNC: 8.8 MG/DL — SIGNIFICANT CHANGE UP (ref 8.4–10.5)
CALCIUM SERPL-MCNC: 9.5 MG/DL — SIGNIFICANT CHANGE UP (ref 8.4–10.5)
CHLORIDE BLDA-SCNC: 86 MMOL/L — LOW (ref 96–108)
CHLORIDE BLDV-SCNC: 85 MMOL/L — LOW (ref 96–108)
CHLORIDE SERPL-SCNC: 85 MMOL/L — LOW (ref 98–107)
CHLORIDE SERPL-SCNC: 85 MMOL/L — LOW (ref 98–107)
CHLORIDE UR-SCNC: 78 MMOL/L — SIGNIFICANT CHANGE UP
CO2 SERPL-SCNC: 30 MMOL/L — SIGNIFICANT CHANGE UP (ref 22–31)
CO2 SERPL-SCNC: 30 MMOL/L — SIGNIFICANT CHANGE UP (ref 22–31)
COLOR SPEC: SIGNIFICANT CHANGE UP
CREAT ?TM UR-MCNC: 18.7 MG/DL — SIGNIFICANT CHANGE UP
CREAT SERPL-MCNC: 0.74 MG/DL — SIGNIFICANT CHANGE UP (ref 0.5–1.3)
CREAT SERPL-MCNC: 0.75 MG/DL — SIGNIFICANT CHANGE UP (ref 0.5–1.3)
EOSINOPHIL # BLD AUTO: 0.01 K/UL — SIGNIFICANT CHANGE UP (ref 0–0.5)
EOSINOPHIL NFR BLD AUTO: 0.1 % — SIGNIFICANT CHANGE UP (ref 0–6)
GAS PNL BLDV: 120 MMOL/L — CRITICAL LOW (ref 136–146)
GLUCOSE BLDA-MCNC: 93 MG/DL — SIGNIFICANT CHANGE UP (ref 70–99)
GLUCOSE BLDA-MCNC: 95 MG/DL — SIGNIFICANT CHANGE UP (ref 70–99)
GLUCOSE BLDV-MCNC: 90 MG/DL — SIGNIFICANT CHANGE UP (ref 70–99)
GLUCOSE SERPL-MCNC: 88 MG/DL — SIGNIFICANT CHANGE UP (ref 70–99)
GLUCOSE SERPL-MCNC: 92 MG/DL — SIGNIFICANT CHANGE UP (ref 70–99)
GLUCOSE UR-MCNC: 30 — SIGNIFICANT CHANGE UP
HCO3 BLDA-SCNC: 27 MMOL/L — HIGH (ref 22–26)
HCO3 BLDA-SCNC: 28 MMOL/L — HIGH (ref 22–26)
HCO3 BLDV-SCNC: 26 MMOL/L — SIGNIFICANT CHANGE UP (ref 20–27)
HCT VFR BLD CALC: 37.6 % — SIGNIFICANT CHANGE UP (ref 34.5–45)
HCT VFR BLDA CALC: 39 % — SIGNIFICANT CHANGE UP (ref 34.5–46.5)
HCT VFR BLDA CALC: 42.5 % — SIGNIFICANT CHANGE UP (ref 34.5–46.5)
HCT VFR BLDV CALC: 40.1 % — SIGNIFICANT CHANGE UP (ref 34.5–45)
HGB BLD-MCNC: 12.4 G/DL — SIGNIFICANT CHANGE UP (ref 11.5–15.5)
HGB BLDA-MCNC: 12.7 G/DL — SIGNIFICANT CHANGE UP (ref 11.5–15.5)
HGB BLDA-MCNC: 13.8 G/DL — SIGNIFICANT CHANGE UP (ref 11.5–15.5)
HGB BLDV-MCNC: 13 G/DL — SIGNIFICANT CHANGE UP (ref 11.5–15.5)
HYALINE CASTS # UR AUTO: NEGATIVE — SIGNIFICANT CHANGE UP
IMM GRANULOCYTES NFR BLD AUTO: 0.5 % — SIGNIFICANT CHANGE UP (ref 0–1.5)
INR BLD: 1.15 — SIGNIFICANT CHANGE UP (ref 0.88–1.16)
KETONES UR-MCNC: NEGATIVE — SIGNIFICANT CHANGE UP
LACTATE BLDA-SCNC: 0.9 MMOL/L — SIGNIFICANT CHANGE UP (ref 0.5–2)
LACTATE BLDV-MCNC: 1 MMOL/L — SIGNIFICANT CHANGE UP (ref 0.5–2)
LEUKOCYTE ESTERASE UR-ACNC: SIGNIFICANT CHANGE UP
LYMPHOCYTES # BLD AUTO: 0.55 K/UL — LOW (ref 1–3.3)
LYMPHOCYTES # BLD AUTO: 4.5 % — LOW (ref 13–44)
MCHC RBC-ENTMCNC: 28.9 PG — SIGNIFICANT CHANGE UP (ref 27–34)
MCHC RBC-ENTMCNC: 33 % — SIGNIFICANT CHANGE UP (ref 32–36)
MCV RBC AUTO: 87.6 FL — SIGNIFICANT CHANGE UP (ref 80–100)
MONOCYTES # BLD AUTO: 0.68 K/UL — SIGNIFICANT CHANGE UP (ref 0–0.9)
MONOCYTES NFR BLD AUTO: 5.6 % — SIGNIFICANT CHANGE UP (ref 2–14)
NEUTROPHILS # BLD AUTO: 10.82 K/UL — HIGH (ref 1.8–7.4)
NEUTROPHILS NFR BLD AUTO: 89.1 % — HIGH (ref 43–77)
NITRITE UR-MCNC: NEGATIVE — SIGNIFICANT CHANGE UP
NRBC # FLD: 0 K/UL — SIGNIFICANT CHANGE UP (ref 0–0)
NT-PROBNP SERPL-SCNC: SIGNIFICANT CHANGE UP PG/ML
OSMOLALITY SERPL: 264 MOSMO/KG — LOW (ref 275–295)
PCO2 BLDA: 66 MMHG — HIGH (ref 32–48)
PCO2 BLDA: 72 MMHG — CRITICAL HIGH (ref 32–48)
PCO2 BLDV: 70 MMHG — HIGH (ref 41–51)
PH BLDA: 7.26 PH — LOW (ref 7.35–7.45)
PH BLDA: 7.3 PH — LOW (ref 7.35–7.45)
PH BLDV: 7.27 PH — LOW (ref 7.32–7.43)
PH UR: 6.5 — SIGNIFICANT CHANGE UP (ref 5–8)
PLATELET # BLD AUTO: 227 K/UL — SIGNIFICANT CHANGE UP (ref 150–400)
PMV BLD: 8.9 FL — SIGNIFICANT CHANGE UP (ref 7–13)
PO2 BLDA: 107 MMHG — SIGNIFICANT CHANGE UP (ref 83–108)
PO2 BLDA: 116 MMHG — HIGH (ref 83–108)
PO2 BLDV: 49 MMHG — HIGH (ref 35–40)
POTASSIUM BLDA-SCNC: 4.7 MMOL/L — HIGH (ref 3.4–4.5)
POTASSIUM BLDA-SCNC: 4.8 MMOL/L — HIGH (ref 3.4–4.5)
POTASSIUM BLDV-SCNC: 5.6 MMOL/L — HIGH (ref 3.4–4.5)
POTASSIUM SERPL-MCNC: 5.5 MMOL/L — HIGH (ref 3.5–5.3)
POTASSIUM SERPL-MCNC: 5.9 MMOL/L — HIGH (ref 3.5–5.3)
POTASSIUM SERPL-SCNC: 5.5 MMOL/L — HIGH (ref 3.5–5.3)
POTASSIUM SERPL-SCNC: 5.9 MMOL/L — HIGH (ref 3.5–5.3)
POTASSIUM UR-SCNC: 21 MMOL/L — SIGNIFICANT CHANGE UP
PROT SERPL-MCNC: 6.6 G/DL — SIGNIFICANT CHANGE UP (ref 6–8.3)
PROT UR-MCNC: 50 — SIGNIFICANT CHANGE UP
PROTHROM AB SERPL-ACNC: 13 SEC — SIGNIFICANT CHANGE UP (ref 10.6–13.6)
RBC # BLD: 4.29 M/UL — SIGNIFICANT CHANGE UP (ref 3.8–5.2)
RBC # FLD: 14.3 % — SIGNIFICANT CHANGE UP (ref 10.3–14.5)
RBC CASTS # UR COMP ASSIST: HIGH (ref 0–?)
SAO2 % BLDA: 97.6 % — SIGNIFICANT CHANGE UP (ref 95–99)
SAO2 % BLDA: 97.8 % — SIGNIFICANT CHANGE UP (ref 95–99)
SAO2 % BLDV: 77.8 % — SIGNIFICANT CHANGE UP (ref 60–85)
SODIUM BLDA-SCNC: 121 MMOL/L — LOW (ref 136–146)
SODIUM BLDA-SCNC: 122 MMOL/L — LOW (ref 136–146)
SODIUM SERPL-SCNC: 120 MMOL/L — CRITICAL LOW (ref 135–145)
SODIUM SERPL-SCNC: 122 MMOL/L — LOW (ref 135–145)
SODIUM UR-SCNC: 74 MMOL/L — SIGNIFICANT CHANGE UP
SP GR SPEC: 1.01 — SIGNIFICANT CHANGE UP (ref 1–1.04)
SQUAMOUS # UR AUTO: SIGNIFICANT CHANGE UP
TROPONIN T, HIGH SENSITIVITY: 38 NG/L — SIGNIFICANT CHANGE UP (ref ?–14)
TROPONIN T, HIGH SENSITIVITY: 39 NG/L — SIGNIFICANT CHANGE UP (ref ?–14)
UROBILINOGEN FLD QL: NORMAL — SIGNIFICANT CHANGE UP
UUN UR-MCNC: 194 MG/DL — SIGNIFICANT CHANGE UP
WBC # BLD: 12.15 K/UL — HIGH (ref 3.8–10.5)
WBC # FLD AUTO: 12.15 K/UL — HIGH (ref 3.8–10.5)
WBC UR QL: HIGH (ref 0–?)

## 2020-10-23 PROCEDURE — 71045 X-RAY EXAM CHEST 1 VIEW: CPT | Mod: 26

## 2020-10-23 PROCEDURE — 93010 ELECTROCARDIOGRAM REPORT: CPT

## 2020-10-23 PROCEDURE — 99285 EMERGENCY DEPT VISIT HI MDM: CPT

## 2020-10-23 RX ORDER — CALCIUM GLUCONATE 100 MG/ML
2 VIAL (ML) INTRAVENOUS ONCE
Refills: 0 | Status: COMPLETED | OUTPATIENT
Start: 2020-10-23 | End: 2020-10-23

## 2020-10-23 RX ORDER — FUROSEMIDE 40 MG
20 TABLET ORAL ONCE
Refills: 0 | Status: COMPLETED | OUTPATIENT
Start: 2020-10-23 | End: 2020-10-23

## 2020-10-23 RX ORDER — NITROGLYCERIN 6.5 MG
1 CAPSULE, EXTENDED RELEASE ORAL ONCE
Refills: 0 | Status: COMPLETED | OUTPATIENT
Start: 2020-10-23 | End: 2020-10-23

## 2020-10-23 RX ORDER — DEXTROSE 50 % IN WATER 50 %
50 SYRINGE (ML) INTRAVENOUS ONCE
Refills: 0 | Status: COMPLETED | OUTPATIENT
Start: 2020-10-23 | End: 2020-10-23

## 2020-10-23 RX ORDER — INSULIN HUMAN 100 [IU]/ML
5 INJECTION, SOLUTION SUBCUTANEOUS ONCE
Refills: 0 | Status: COMPLETED | OUTPATIENT
Start: 2020-10-23 | End: 2020-10-23

## 2020-10-23 RX ORDER — PIPERACILLIN AND TAZOBACTAM 4; .5 G/20ML; G/20ML
3.38 INJECTION, POWDER, LYOPHILIZED, FOR SOLUTION INTRAVENOUS ONCE
Refills: 0 | Status: COMPLETED | OUTPATIENT
Start: 2020-10-23 | End: 2020-10-23

## 2020-10-23 RX ORDER — NITROGLYCERIN 6.5 MG
5 CAPSULE, EXTENDED RELEASE ORAL
Qty: 50 | Refills: 0 | Status: DISCONTINUED | OUTPATIENT
Start: 2020-10-23 | End: 2020-10-24

## 2020-10-23 RX ADMIN — Medication 20 MILLIGRAM(S): at 15:58

## 2020-10-23 RX ADMIN — Medication 1.5 MICROGRAM(S)/MIN: at 18:51

## 2020-10-23 RX ADMIN — INSULIN HUMAN 5 UNIT(S): 100 INJECTION, SOLUTION SUBCUTANEOUS at 15:53

## 2020-10-23 RX ADMIN — Medication 50 MILLILITER(S): at 15:56

## 2020-10-23 RX ADMIN — Medication 200 GRAM(S): at 16:04

## 2020-10-23 NOTE — ED ADULT TRIAGE NOTE - CHIEF COMPLAINT QUOTE
Pt BIBEMS from home for shortness of breath, as per EMS pt found to be 66% on room air, placed on CPAP en route. Pt taken straight to rm 8.

## 2020-10-23 NOTE — ED ADULT NURSE NOTE - OBJECTIVE STATEMENT
Arrives to room 8 with ems, bi pap in place. Patient is awake and alert, appears in no distress at this time, labs sent , will continue to monitor.

## 2020-10-23 NOTE — ED PROVIDER NOTE - ATTENDING CONTRIBUTION TO CARE
Dr. Lepe: 89 yo female with PMH CVA in past, currently LE cellulitis, otherwise unclear medical history, in ED with hypoxia 65% and SOB today.  As per EMS, pt had bilateral rales on their arrival, improved with CPAP and nitro SL x 2 en route.  Pt arrived ED awake/alert, trying to speak but difficult to hear due to CPAP.  On exam pt unwell appearing, in moderate respiratory distress, heart RRR, lungs diffuse rhonchi, abd NTND, both LEs with large amount of proximal extremity swelling but distally no swelling but +mild erythema both distal LEs, strength 5/5 in all extremities.

## 2020-10-23 NOTE — ED PROVIDER NOTE - PHYSICAL EXAMINATION
PHYSICAL EXAM:  GENERAL: non-toxic appearing; on CPAP  HEAD Atraumatic, Normocephalic  NECK: No JVD; FROM  EYES: PERRL, EOMs intact b/l w/out deficits  CHEST/LUNG: coarse BS; tachy-pneic   HEART: RRR no murmur/gallops/rubs  ABDOMEN: +BS, soft, NT, ND  EXTREMITIES: No LE edema, +2 radial pulses b/l, +2 DP/PT pulses b/l  MUSCULOSKELETAL: FROM of all 4 extremities  NERVOUS SYSTEM:  A&Ox3, No motor deficits or sensory deficits; CNII-XII intact; no focal neurologic deficits  SKIN:  No new rashes PHYSICAL EXAM:  GENERAL: non-toxic appearing; on CPAP  HEAD Atraumatic, Normocephalic  NECK: No JVD; FROM  EYES: PERRL, EOMs intact b/l w/out deficits  CHEST/LUNG: coarse BS; tachy-pneic   HEART: RRR no murmur/gallops/rubs  ABDOMEN: +BS, soft, NT, ND  EXTREMITIES: No LE edema, +2 radial pulses b/l, +2 DP/PT pulses b/l  NERVOUS SYSTEM:  A&Ox3, No motor deficits or sensory deficits; CNII-XII intact; no focal neurologic deficits  SKIN:  No new rashes

## 2020-10-23 NOTE — ED PROVIDER NOTE - OBJECTIVE STATEMENT
89 yo F with no known pmhx, admitted in Jan 2020 for AMS, CAP, UTI, presents to the ED via EMS for dyspnea and hypoxia to 65% on RA. Was placed on CPAP by EMS with improvement. Was also given sublingual nitro x2 for hypertension. History limited due to pt’s illness. 89 yo F with no known pmhx, admitted in Jan 2020 for AMS, CAP, UTI, presents to the ED via EMS for dyspnea and hypoxia to 65% on RA. Was placed on CPAP by EMS with improvement. Was also given sublingual nitro x2 for hypertension. History limited due to pt’s illness.    Attempted [phone] and [phone] to reach family. Both phone numbers are out of service. 89 yo F with PMHx CVA now w/ dysarthria, admitted in Jan 2020 for AMS, CAP, UTI, presents to the ED via EMS for dyspnea and hypoxia to 65% on RA. Was placed on CPAP by EMS with improvement. Was also given sublingual nitro x2 for hypertension. History limited due to pt’s illness.    Attempted [phone] and [phone] to reach family. Both phone numbers are out of service.

## 2020-10-23 NOTE — ED PROVIDER NOTE - PROGRESS NOTE DETAILS
Garth Kong MD. [facility] Garth Kong MD. made DNR/DNI. molst form filled out by attending Dr. Carson. Garth Kong MD. pt desated while on bipap 10/5, 40% to 90%, improved to >95%. nitro gtt started due to desating. currently not a MICU candidate at this time. Garth Kong MD. MICU consulted, will see patient Garth Kong MD. pt desated while on bipap 10/5, 40% to 90%, improved to >95%. nitro gtt started due to desating. currently not a MICU candidate at this time. will continue with close monitoring. Adam ARCHER MD PGY3: Elmo Stern [phone] cheryl: pt had high pco2 of 70, gave ntg iv in addition to lasix to try to improve chf care, pt improved to pco2 66 but very improved with mental status.  able to remove iv nitro, still on bipap.

## 2020-10-23 NOTE — CONSULT NOTE ADULT - SUBJECTIVE AND OBJECTIVE BOX
CHIEF COMPLAINT: SOB/hypoxia    HPI:    89 y/o F with PMHx CVA now w/ dysarthria on ASA 81mg daily, no other reported medical hx, admitted in Jan 2020 for AMS, CAP, UTI, hyponatremia, presents to the ED via EMS for dyspnea and hypoxia to 65% on RA. Was placed on CPAP by EMS with improvement. Was also given sublingual nitro x2 for hypertension. History limited due to pt’s illness.    Per ER attending at bedside, patient is known to her through family. She reports patient has history of CVA ~4-5 years ago leading to decreased speech and dysarthria, followed by a fall which has led her to be wheelchair bound. Pt has refused further follow-up for her stroke, and has not had consistent follow-up with a primary care doctor.    In the ED patient had stable O2 sat on CPAP on arrival, was transitioned to BiPAP 10/5 at 40% with good oxygenation and improved WOB. Afebrile. Labs significant for mild leukocytosis, hyponatremia 120, K 5.9, trop 39 VBG pH 7.27/pCO2 70, BNP 04389. EKG w/ sinus bradycardia, BP stable.    PAST MEDICAL & SURGICAL HISTORY:  No pertinent past medical history    S/P cholecystectomy        FAMILY HISTORY:  No pertinent family history in first degree relatives        SOCIAL HISTORY: Unable to obtain    Allergies    No Known Allergies    Intolerances        HOME MEDICATIONS:    REVIEW OF SYSTEMS:  Constitutional:   Eyes:  ENT:  CV:  Resp:  GI:  :  MSK:  Integumentary:  Neurological:  Psychiatric:  Endocrine:  Hematologic/Lymphatic:  Allergic/Immunologic:  [ ] All other systems negative  [x] Unable to assess ROS because not verbal    OBJECTIVE:  ICU Vital Signs Last 24 Hrs  T(C): 36.5 (23 Oct 2020 14:29), Max: 36.5 (23 Oct 2020 14:29)  T(F): 97.7 (23 Oct 2020 14:29), Max: 97.7 (23 Oct 2020 14:29)  HR: 56 (23 Oct 2020 16:06) (56 - 59)  BP: 168/70 (23 Oct 2020 16:06) (131/42 - 188/80)  BP(mean): --  ABP: --  ABP(mean): --  RR: 28 (23 Oct 2020 16:06) (23 - 30)  SpO2: 95% (23 Oct 2020 16:06) (95% - 97%)        CAPILLARY BLOOD GLUCOSE      POCT Blood Glucose.: 88 mg/dL (23 Oct 2020 15:51)      PHYSICAL EXAM:  GENERAL: Sitting comfortable in stretcher, on BiPAP, breathing comfortable, alert and tracks with eye but not responding to questions or commands  HENMT: Atraumatic, dry mucous membranes  EYES: Clear bilaterally, PERRL, EOMs intact b/l  HEART: RRR, nl S1/S2, no murmurs noted  RESPIRATORY: Diffuse rhonchi and rales /bl  ABDOMEN: +BS, soft, nontender, nondistended  EXTREMITIES: lipodermatosclerosis LE b/l, +2 radial pulses b/l  NEURO:  Alert, unable to assess orientation as non verbal at this time, no obvious focal motor or sensory deficits noted  Heme/LYMPH: No ecchymosis or bruising, no anterior/posterior cervical or supraclavicular LAD  SKIN:  Skin normal color for race, warm, dry and intact. No evidence of rash.      HOSPITAL MEDICATIONS:  MEDICATIONS  (STANDING):  nitroglycerin    2% Ointment 1 Inch(s) Transdermal Once  piperacillin/tazobactam IVPB... 3.375 Gram(s) IV Intermittent once    MEDICATIONS  (PRN):      LABS:                        12.4   12.15 )-----------( 227      ( 23 Oct 2020 13:30 )             37.6     10-23    120<LL>  |  85<L>  |  21  ----------------------------<  92  5.9<H>   |  30  |  0.74    Ca    8.8      23 Oct 2020 13:30    TPro  6.6  /  Alb  3.5  /  TBili  0.6  /  DBili  x   /  AST  21  /  ALT  18  /  AlkPhos  105  10-23    PT/INR - ( 23 Oct 2020 13:30 )   PT: 13.0 SEC;   INR: 1.15          PTT - ( 23 Oct 2020 13:30 )  PTT:30.0 SEC      Venous Blood Gas:  10-23 @ 13:30  7.27/70/49/26/77.8  VBG Lactate: 1.0      MICROBIOLOGY:     RADIOLOGY:  [x] Reviewed and interpreted by me    EKG:

## 2020-10-23 NOTE — ED PROVIDER NOTE - CLINICAL SUMMARY MEDICAL DECISION MAKING FREE TEXT BOX
Garth Kong MD. pt with hypoxia 65% per EMS, improved on CPAP placed by EMS; also hypertensive in field, given subL nitro x2 w/ improvement. coarse BS b/l. in respiraotry distress, but mentating well and feels better w/ BiPAP. pulm edema w/ unclear etiology; chf vs acs. will send labs, xray, likely admit

## 2020-10-23 NOTE — CONSULT NOTE ADULT - ASSESSMENT
89 y/o F with PMHx CVA now w/ dysarthria on ASA 81mg daily, wheelchair/bedbound, no other reported medical hx but poor follow-up, admitted in Jan 2020 for AMS, CAP, UTI, and hyponatremia, presenting to the ED via EMS for dyspnea and hypoxia to 65% on RA at home, now improved on BiPAP c/b hyponatremia and hyperkalemia s/p hyperkalemia cocktail.    #New BiPAP/electrolyte abnormalities - Given clinical presentation, exam and elevated BNP, suspicious for new onset HF, possibly exacerbated by viral and/or bacterial pneumonia. Patient alert, tracking with eyes, does not appear somnolent or obtunded, breathing comfortable on current BiPAP settings, O2 sat 96% on FiO2 40%.  - Agree with lasix for IV diuresis, monitor Is/Os for response, urine electrolytes ordered and communicated to RN to evaluate patient's hyponatremia (presumably hypervolemic hyponatremia in setting of volume overload)  - C/w BiPAP 10/5 at FiO2 40%, once diuresis received and patient net negative would attempt to wean to nasal cannula  - Urine legionella ordered given respiratory symptoms and hyponatremia  - Would cover patient for CAP w/ CTX/azithro  - repeat BMP to assess for improvement in potassium after lasix received, [facility]  - As pt with stable neurologic and respiratory status currently on BiPAP, transfer to ICU not indicated at this time.    Please reconsult or call with any questions or concerns.    Case d/w Dr. Malini Chong  EM/IM PGY3  MICU Consult Resident 89 y/o F with PMHx CVA now w/ dysarthria on ASA 81mg daily, wheelchair/bedbound, no other reported medical hx but poor follow-up, admitted in Jan 2020 for AMS, CAP, UTI, and hyponatremia, presenting to the ED via EMS for dyspnea and hypoxia to 65% on RA at home, now improved on BiPAP c/b hyponatremia and hyperkalemia s/p hyperkalemia cocktail.    #New BiPAP/electrolyte abnormalities - Given clinical presentation, exam and elevated BNP, suspicious for new onset HF, possibly exacerbated by viral and/or bacterial pneumonia. Patient alert, tracking with eyes, does not appear somnolent or obtunded, breathing comfortable on current BiPAP settings, O2 sat 96% on FiO2 40%.  - Agree with lasix for IV diuresis, monitor Is/Os for response, urine electrolytes ordered and communicated to RN to evaluate patient's hyponatremia (presumably hypervolemic hyponatremia in setting of volume overload), likely chronic would correct <6-8meq per 24h  - C/w BiPAP 10/5 at FiO2 40%, once diuresis received and patient net negative would attempt to wean to nasal cannula  - Urine legionella ordered given respiratory symptoms and hyponatremia  - Would cover patient for CAP w/ CTX/azithro  - repeat BMP to assess for improvement in potassium after lasix received, can consider lokelma x1  - As pt with stable neurologic and respiratory status currently on BiPAP, transfer to ICU not indicated at this time.    Please reconsult or call with any questions or concerns.    Case d/w Dr. Mailni Chong  EM/IM PGY3  MICU Consult Resident

## 2020-10-23 NOTE — ED PROCEDURE NOTE - ATTENDING CONTRIBUTION TO CARE
THIS SCAN WAS FOR EDUCATIONAL PURPOSES ONLY.    Dr. Lepe: I personally supervised this procedure performed by the resident and I agree with the above documentation.

## 2020-10-24 DIAGNOSIS — E87.5 HYPERKALEMIA: ICD-10-CM

## 2020-10-24 DIAGNOSIS — J96.01 ACUTE RESPIRATORY FAILURE WITH HYPOXIA: ICD-10-CM

## 2020-10-24 DIAGNOSIS — I50.9 HEART FAILURE, UNSPECIFIED: ICD-10-CM

## 2020-10-24 DIAGNOSIS — R32 UNSPECIFIED URINARY INCONTINENCE: ICD-10-CM

## 2020-10-24 DIAGNOSIS — E87.1 HYPO-OSMOLALITY AND HYPONATREMIA: ICD-10-CM

## 2020-10-24 DIAGNOSIS — Z29.9 ENCOUNTER FOR PROPHYLACTIC MEASURES, UNSPECIFIED: ICD-10-CM

## 2020-10-24 DIAGNOSIS — J69.0 PNEUMONITIS DUE TO INHALATION OF FOOD AND VOMIT: ICD-10-CM

## 2020-10-24 DIAGNOSIS — Z02.9 ENCOUNTER FOR ADMINISTRATIVE EXAMINATIONS, UNSPECIFIED: ICD-10-CM

## 2020-10-24 DIAGNOSIS — I63.9 CEREBRAL INFARCTION, UNSPECIFIED: ICD-10-CM

## 2020-10-24 DIAGNOSIS — G93.41 METABOLIC ENCEPHALOPATHY: ICD-10-CM

## 2020-10-24 LAB
-  COAGULASE NEGATIVE STAPHYLOCOCCUS: SIGNIFICANT CHANGE UP
ANION GAP SERPL CALC-SCNC: 11 MMO/L — SIGNIFICANT CHANGE UP (ref 7–14)
ANION GAP SERPL CALC-SCNC: 9 MMO/L — SIGNIFICANT CHANGE UP (ref 7–14)
BASE EXCESS BLDA CALC-SCNC: 5.7 MMOL/L — SIGNIFICANT CHANGE UP
BASE EXCESS BLDA CALC-SCNC: 6.3 MMOL/L — SIGNIFICANT CHANGE UP
BASE EXCESS BLDCOV CALC-SCNC: 3.8 MMOL/L — HIGH (ref -9.3–0.3)
BASE EXCESS BLDV CALC-SCNC: 4.4 MMOL/L — SIGNIFICANT CHANGE UP
BLOOD GAS ARTERIAL - FIO2: 50 — SIGNIFICANT CHANGE UP
BLOOD GAS ARTERIAL - FIO2: 60 — SIGNIFICANT CHANGE UP
BUN SERPL-MCNC: 21 MG/DL — SIGNIFICANT CHANGE UP (ref 7–23)
BUN SERPL-MCNC: 21 MG/DL — SIGNIFICANT CHANGE UP (ref 7–23)
CALCIUM SERPL-MCNC: 8.6 MG/DL — SIGNIFICANT CHANGE UP (ref 8.4–10.5)
CALCIUM SERPL-MCNC: 8.8 MG/DL — SIGNIFICANT CHANGE UP (ref 8.4–10.5)
CHLORIDE BLDA-SCNC: 84 MMOL/L — LOW (ref 96–108)
CHLORIDE BLDA-SCNC: 86 MMOL/L — LOW (ref 96–108)
CHLORIDE SERPL-SCNC: 86 MMOL/L — LOW (ref 98–107)
CHLORIDE SERPL-SCNC: 86 MMOL/L — LOW (ref 98–107)
CHOLEST SERPL-MCNC: 119 MG/DL — LOW (ref 120–199)
CO2 SERPL-SCNC: 26 MMOL/L — SIGNIFICANT CHANGE UP (ref 22–31)
CO2 SERPL-SCNC: 29 MMOL/L — SIGNIFICANT CHANGE UP (ref 22–31)
CREAT SERPL-MCNC: 0.69 MG/DL — SIGNIFICANT CHANGE UP (ref 0.5–1.3)
CREAT SERPL-MCNC: 0.74 MG/DL — SIGNIFICANT CHANGE UP (ref 0.5–1.3)
DIRECT LDL: 42 MG/DL — SIGNIFICANT CHANGE UP
GLUCOSE BLDA-MCNC: 68 MG/DL — LOW (ref 70–99)
GLUCOSE BLDA-MCNC: 88 MG/DL — SIGNIFICANT CHANGE UP (ref 70–99)
GLUCOSE BLDC GLUCOMTR-MCNC: 106 MG/DL — HIGH (ref 70–99)
GLUCOSE BLDC GLUCOMTR-MCNC: 108 MG/DL — HIGH (ref 70–99)
GLUCOSE BLDC GLUCOMTR-MCNC: 108 MG/DL — HIGH (ref 70–99)
GLUCOSE BLDC GLUCOMTR-MCNC: 125 MG/DL — HIGH (ref 70–99)
GLUCOSE BLDC GLUCOMTR-MCNC: 63 MG/DL — LOW (ref 70–99)
GLUCOSE BLDC GLUCOMTR-MCNC: 78 MG/DL — SIGNIFICANT CHANGE UP (ref 70–99)
GLUCOSE BLDC GLUCOMTR-MCNC: 80 MG/DL — SIGNIFICANT CHANGE UP (ref 70–99)
GLUCOSE BLDC GLUCOMTR-MCNC: 91 MG/DL — SIGNIFICANT CHANGE UP (ref 70–99)
GLUCOSE BLDC GLUCOMTR-MCNC: 96 MG/DL — SIGNIFICANT CHANGE UP (ref 70–99)
GLUCOSE SERPL-MCNC: 67 MG/DL — LOW (ref 70–99)
GLUCOSE SERPL-MCNC: 82 MG/DL — SIGNIFICANT CHANGE UP (ref 70–99)
GRAM STN FLD: SIGNIFICANT CHANGE UP
HBA1C BLD-MCNC: 5.6 % — SIGNIFICANT CHANGE UP (ref 4–5.6)
HCO3 BLDA-SCNC: 28 MMOL/L — HIGH (ref 22–26)
HCO3 BLDA-SCNC: 28 MMOL/L — HIGH (ref 22–26)
HCO3 BLDV-SCNC: 26 MMOL/L — SIGNIFICANT CHANGE UP (ref 20–27)
HCT VFR BLD CALC: 39.4 % — SIGNIFICANT CHANGE UP (ref 34.5–45)
HCT VFR BLDA CALC: 40 % — SIGNIFICANT CHANGE UP (ref 34.5–46.5)
HCT VFR BLDA CALC: 41.3 % — SIGNIFICANT CHANGE UP (ref 34.5–46.5)
HDLC SERPL-MCNC: 72 MG/DL — HIGH (ref 45–65)
HGB BLD-MCNC: 12.3 G/DL — SIGNIFICANT CHANGE UP (ref 11.5–15.5)
HGB BLDA-MCNC: 13 G/DL — SIGNIFICANT CHANGE UP (ref 11.5–15.5)
HGB BLDA-MCNC: 13.5 G/DL — SIGNIFICANT CHANGE UP (ref 11.5–15.5)
LACTATE BLDA-SCNC: 0.7 MMOL/L — SIGNIFICANT CHANGE UP (ref 0.5–2)
LACTATE BLDA-SCNC: 1.6 MMOL/L — SIGNIFICANT CHANGE UP (ref 0.5–2)
MAGNESIUM SERPL-MCNC: 1.9 MG/DL — SIGNIFICANT CHANGE UP (ref 1.6–2.6)
MCHC RBC-ENTMCNC: 28.3 PG — SIGNIFICANT CHANGE UP (ref 27–34)
MCHC RBC-ENTMCNC: 31.2 % — LOW (ref 32–36)
MCV RBC AUTO: 90.8 FL — SIGNIFICANT CHANGE UP (ref 80–100)
METHOD TYPE: SIGNIFICANT CHANGE UP
NRBC # FLD: 0 K/UL — SIGNIFICANT CHANGE UP (ref 0–0)
PCO2 BLDA: 74 MMHG — CRITICAL HIGH (ref 32–48)
PCO2 BLDA: 76 MMHG — CRITICAL HIGH (ref 32–48)
PCO2 BLDCOV: 83 MMHG — HIGH (ref 27–49)
PCO2 BLDV: 79 MMHG — HIGH (ref 41–51)
PH BLDA: 7.26 PH — LOW (ref 7.35–7.45)
PH BLDA: 7.27 PH — LOW (ref 7.35–7.45)
PH BLDCOV: 7.2 PH — LOW (ref 7.25–7.45)
PH BLDV: 7.23 PH — LOW (ref 7.32–7.43)
PHOSPHATE SERPL-MCNC: 4.8 MG/DL — HIGH (ref 2.5–4.5)
PLATELET # BLD AUTO: 198 K/UL — SIGNIFICANT CHANGE UP (ref 150–400)
PMV BLD: 9.9 FL — SIGNIFICANT CHANGE UP (ref 7–13)
PO2 BLDA: 163 MMHG — HIGH (ref 83–108)
PO2 BLDA: 165 MMHG — HIGH (ref 83–108)
PO2 BLDCOA: 63.6 MMHG — HIGH (ref 17–41)
PO2 BLDV: 73 MMHG — HIGH (ref 35–40)
POTASSIUM BLDA-SCNC: 3.8 MMOL/L — SIGNIFICANT CHANGE UP (ref 3.4–4.5)
POTASSIUM BLDA-SCNC: 4.2 MMOL/L — SIGNIFICANT CHANGE UP (ref 3.4–4.5)
POTASSIUM SERPL-MCNC: 4.8 MMOL/L — SIGNIFICANT CHANGE UP (ref 3.5–5.3)
POTASSIUM SERPL-MCNC: 5.5 MMOL/L — HIGH (ref 3.5–5.3)
POTASSIUM SERPL-SCNC: 4.8 MMOL/L — SIGNIFICANT CHANGE UP (ref 3.5–5.3)
POTASSIUM SERPL-SCNC: 5.5 MMOL/L — HIGH (ref 3.5–5.3)
RBC # BLD: 4.34 M/UL — SIGNIFICANT CHANGE UP (ref 3.8–5.2)
RBC # FLD: 14 % — SIGNIFICANT CHANGE UP (ref 10.3–14.5)
SAO2 % BLDA: 99.1 % — HIGH (ref 95–99)
SAO2 % BLDA: 99.5 % — HIGH (ref 95–99)
SAO2 % BLDV: 92.9 % — HIGH (ref 60–85)
SODIUM BLDA-SCNC: 124 MMOL/L — LOW (ref 136–146)
SODIUM BLDA-SCNC: 125 MMOL/L — LOW (ref 136–146)
SODIUM SERPL-SCNC: 123 MMOL/L — LOW (ref 135–145)
SODIUM SERPL-SCNC: 124 MMOL/L — LOW (ref 135–145)
SPECIMEN SOURCE: SIGNIFICANT CHANGE UP
SPECIMEN SOURCE: SIGNIFICANT CHANGE UP
TRIGL SERPL-MCNC: 40 MG/DL — SIGNIFICANT CHANGE UP (ref 10–149)
TROPONIN T, HIGH SENSITIVITY: 44 NG/L — SIGNIFICANT CHANGE UP (ref ?–14)
TSH SERPL-MCNC: 2.88 UIU/ML — SIGNIFICANT CHANGE UP (ref 0.27–4.2)
WBC # BLD: 9.72 K/UL — SIGNIFICANT CHANGE UP (ref 3.8–10.5)
WBC # FLD AUTO: 9.72 K/UL — SIGNIFICANT CHANGE UP (ref 3.8–10.5)

## 2020-10-24 PROCEDURE — 99223 1ST HOSP IP/OBS HIGH 75: CPT | Mod: GC

## 2020-10-24 PROCEDURE — 12345: CPT | Mod: NC,GC

## 2020-10-24 RX ORDER — ISOSORBIDE DINITRATE 5 MG/1
5 TABLET ORAL THREE TIMES A DAY
Refills: 0 | Status: DISCONTINUED | OUTPATIENT
Start: 2020-10-24 | End: 2020-10-26

## 2020-10-24 RX ORDER — DEXTROSE 50 % IN WATER 50 %
75 SYRINGE (ML) INTRAVENOUS ONCE
Refills: 0 | Status: DISCONTINUED | OUTPATIENT
Start: 2020-10-24 | End: 2020-10-24

## 2020-10-24 RX ORDER — DEXTROSE 50 % IN WATER 50 %
50 SYRINGE (ML) INTRAVENOUS ONCE
Refills: 0 | Status: COMPLETED | OUTPATIENT
Start: 2020-10-24 | End: 2020-10-24

## 2020-10-24 RX ORDER — GLUCAGON INJECTION, SOLUTION 0.5 MG/.1ML
1 INJECTION, SOLUTION SUBCUTANEOUS ONCE
Refills: 0 | Status: DISCONTINUED | OUTPATIENT
Start: 2020-10-24 | End: 2020-10-31

## 2020-10-24 RX ORDER — DEXTROSE 50 % IN WATER 50 %
25 SYRINGE (ML) INTRAVENOUS ONCE
Refills: 0 | Status: DISCONTINUED | OUTPATIENT
Start: 2020-10-24 | End: 2020-10-29

## 2020-10-24 RX ORDER — FUROSEMIDE 40 MG
40 TABLET ORAL DAILY
Refills: 0 | Status: DISCONTINUED | OUTPATIENT
Start: 2020-10-24 | End: 2020-10-27

## 2020-10-24 RX ORDER — SODIUM CHLORIDE 9 MG/ML
1000 INJECTION, SOLUTION INTRAVENOUS
Refills: 0 | Status: DISCONTINUED | OUTPATIENT
Start: 2020-10-24 | End: 2020-10-29

## 2020-10-24 RX ORDER — PIPERACILLIN AND TAZOBACTAM 4; .5 G/20ML; G/20ML
3.38 INJECTION, POWDER, LYOPHILIZED, FOR SOLUTION INTRAVENOUS EVERY 8 HOURS
Refills: 0 | Status: COMPLETED | OUTPATIENT
Start: 2020-10-24 | End: 2020-10-30

## 2020-10-24 RX ORDER — FUROSEMIDE 40 MG
40 TABLET ORAL DAILY
Refills: 0 | Status: DISCONTINUED | OUTPATIENT
Start: 2020-10-24 | End: 2020-10-24

## 2020-10-24 RX ORDER — ASPIRIN/CALCIUM CARB/MAGNESIUM 324 MG
81 TABLET ORAL DAILY
Refills: 0 | Status: DISCONTINUED | OUTPATIENT
Start: 2020-10-24 | End: 2020-10-31

## 2020-10-24 RX ORDER — POLYETHYLENE GLYCOL 3350 17 G/17G
17 POWDER, FOR SOLUTION ORAL
Refills: 0 | Status: DISCONTINUED | OUTPATIENT
Start: 2020-10-24 | End: 2020-10-31

## 2020-10-24 RX ORDER — DEXTROSE 50 % IN WATER 50 %
15 SYRINGE (ML) INTRAVENOUS ONCE
Refills: 0 | Status: DISCONTINUED | OUTPATIENT
Start: 2020-10-24 | End: 2020-10-29

## 2020-10-24 RX ORDER — ENOXAPARIN SODIUM 100 MG/ML
40 INJECTION SUBCUTANEOUS DAILY
Refills: 0 | Status: DISCONTINUED | OUTPATIENT
Start: 2020-10-24 | End: 2020-10-31

## 2020-10-24 RX ORDER — INSULIN HUMAN 100 [IU]/ML
10 INJECTION, SOLUTION SUBCUTANEOUS ONCE
Refills: 0 | Status: DISCONTINUED | OUTPATIENT
Start: 2020-10-24 | End: 2020-10-24

## 2020-10-24 RX ORDER — DEXTROSE 50 % IN WATER 50 %
12.5 SYRINGE (ML) INTRAVENOUS ONCE
Refills: 0 | Status: COMPLETED | OUTPATIENT
Start: 2020-10-24 | End: 2020-10-24

## 2020-10-24 RX ORDER — DEXTROSE 50 % IN WATER 50 %
12.5 SYRINGE (ML) INTRAVENOUS ONCE
Refills: 0 | Status: DISCONTINUED | OUTPATIENT
Start: 2020-10-24 | End: 2020-10-29

## 2020-10-24 RX ORDER — AZITHROMYCIN 500 MG/1
500 TABLET, FILM COATED ORAL EVERY 24 HOURS
Refills: 0 | Status: DISCONTINUED | OUTPATIENT
Start: 2020-10-24 | End: 2020-10-24

## 2020-10-24 RX ORDER — HYDRALAZINE HCL 50 MG
10 TABLET ORAL EVERY 8 HOURS
Refills: 0 | Status: DISCONTINUED | OUTPATIENT
Start: 2020-10-24 | End: 2020-10-26

## 2020-10-24 RX ORDER — INSULIN HUMAN 100 [IU]/ML
10 INJECTION, SOLUTION SUBCUTANEOUS ONCE
Refills: 0 | Status: COMPLETED | OUTPATIENT
Start: 2020-10-24 | End: 2020-10-24

## 2020-10-24 RX ADMIN — Medication 12.5 GRAM(S): at 17:49

## 2020-10-24 RX ADMIN — Medication 12.5 GRAM(S): at 18:48

## 2020-10-24 RX ADMIN — AZITHROMYCIN 255 MILLIGRAM(S): 500 TABLET, FILM COATED ORAL at 05:04

## 2020-10-24 RX ADMIN — INSULIN HUMAN 10 UNIT(S): 100 INJECTION, SOLUTION SUBCUTANEOUS at 07:41

## 2020-10-24 RX ADMIN — ENOXAPARIN SODIUM 40 MILLIGRAM(S): 100 INJECTION SUBCUTANEOUS at 15:22

## 2020-10-24 RX ADMIN — PIPERACILLIN AND TAZOBACTAM 25 GRAM(S): 4; .5 INJECTION, POWDER, LYOPHILIZED, FOR SOLUTION INTRAVENOUS at 15:21

## 2020-10-24 RX ADMIN — Medication 40 MILLIGRAM(S): at 05:10

## 2020-10-24 RX ADMIN — PIPERACILLIN AND TAZOBACTAM 25 GRAM(S): 4; .5 INJECTION, POWDER, LYOPHILIZED, FOR SOLUTION INTRAVENOUS at 23:39

## 2020-10-24 RX ADMIN — Medication 50 MILLILITER(S): at 07:41

## 2020-10-24 RX ADMIN — PIPERACILLIN AND TAZOBACTAM 25 GRAM(S): 4; .5 INJECTION, POWDER, LYOPHILIZED, FOR SOLUTION INTRAVENOUS at 06:40

## 2020-10-24 RX ADMIN — PIPERACILLIN AND TAZOBACTAM 200 GRAM(S): 4; .5 INJECTION, POWDER, LYOPHILIZED, FOR SOLUTION INTRAVENOUS at 00:28

## 2020-10-24 NOTE — ED ADULT NURSE REASSESSMENT NOTE - NS ED NURSE REASSESS COMMENT FT1
Patient remains in bi pap, positioned for comfort, skin noted intact, rectal temp obtained, = 97.7  will continue to monitor.
Patient repositioned in bed, bony prominences and extremities elevated with pillows. Patient hands elevated due to swelling and wrapped loosely for comfort.

## 2020-10-24 NOTE — H&P ADULT - PROBLEM SELECTOR PLAN 2
- pt in acute decompensated heart failure, appears hypervolemic on exam. Unclear etiology. ACS workup negative  - Also with inappriopriate bradycardia c/f SSS  - CXR w/ pulmonary edema  - Elevated BNP  - f/u TTE  - Strict IOs   - Daily weights  -c/w lasix 40 IVP qd (lasix naive) - pt in acute decompensated heart failure, appears hypervolemic on exam. Unclear etiology. ACS workup negative  - Also with inappropriate bradycardia c/f SSS  - CXR w/ pulmonary edema  - Elevated BNP  - f/u TTE  - Strict IOs   - Daily weights  -c/w lasix 40 IVP qd (lasix naive)

## 2020-10-24 NOTE — CHART NOTE - NSCHARTNOTEFT_GEN_A_CORE
MICU Follow up Note    Called by primary team d/t somnolence, low Vt on bipap and worsening respiratory acidosis. Seen by MICU consult team yesterday afternoon.     On my exam, patient is somnolent, on Bipap, 12/5 with Vt in 160s range.   Inadequate IPAP to facilitate ventilation and EPAP to maintain patent airway    Unclear if blood gas sent most recently is arterial or venous, it is reported as an umbilical vein gas.         Recommend-   Change to AVAPS mode Vt 400, MaxP 35, PEEP 8, MinP 11  On this mode, minute ventilation is approximately 9L and she has become more awake with stimulation.   Recommend maintain on AVAPS for time being, not Bipap    Agree with diuresis for heart failure.     Continue current care on medical floor, not accepted to ICU.   Please re-call with questions.

## 2020-10-24 NOTE — CHART NOTE - NSCHARTNOTEFT_GEN_A_CORE
Blood Gas Arterial Comprehensive (10.24.20 @ 13:20)    pH, Arterial: 7.27 pH    pCO2, Arterial: 74 mmHg    pO2, Arterial: 165 mmHg    HCO3, Arterial: 28 mmol/L    Blood Gas Arterial - FIO2: 60        FiO2 decreased to 40%.

## 2020-10-24 NOTE — PROGRESS NOTE ADULT - PROBLEM SELECTOR PLAN 3
- pt w/ hx dysarthria, CAP, now w/ focal consolidations in RML RLL  - f/u sputum cx  - f/u urine legionella  - cw zosyn (10/23 - )  - start azithro (10/23 - ) pt w/ hx dysarthria, CAP, now w/ focal consolidations in RML RLL    - f/u sputum cx  - f/u urine legionella  - c/w zosyn (10/23 - )  - c/w azithro (10/23 - ) pt w/ hx dysarthria, CAP, now w/ focal consolidations in RML RLL    - f/u sputum cx  - f/u urine legionella  - c/w zosyn (10/23 - )  - d/c lc (10/23-10/24)

## 2020-10-24 NOTE — H&P ADULT - NSICDXPASTMEDICALHX_GEN_ALL_CORE_FT
PAST MEDICAL HISTORY:  Attempted suicide     Constipation     Depression     History of rhabdomyolysis     No pertinent past medical history     Stroke     Urinary incontinence     Wheelchair bound

## 2020-10-24 NOTE — H&P ADULT - PROBLEM SELECTOR PLAN 4
- presumably hypervolemic hyponatremia in setting of volume overload, likely chronic would correct <6-8meq per 24h  - Urine studies not c/f SIADH  - Fluid restrict 1.5L +/- salt tabs. Correct 6-8meq/day  - Diuresis as above - presumably hypervolemic hyponatremia in setting of volume overload, likely chronic would correct <6-8meq per 24h  - Urine studies c/w SIADH as well  - Fluid restrict 1.5L +/- salt tabs. Correct 6-8meq/day  - Diuresis as above

## 2020-10-24 NOTE — H&P ADULT - ASSESSMENT
ADHF  - C/w BiPAP 10/5 at FiO2 40%, once diuresis received and patient net negative would attempt to wean to nasal cannula  HypoNa   (presumably hypervolemic hyponatremia in setting of volume overload), likely chronic would correct <6-8meq per 24h    Hypoxia  acute hypoxemic hypercapnic respiratory failure in the setting of volume overload and possible aspiration event.  - C/w BiPAP 10/5 at FiO2 40%,  CAP w/ CTX/azithro    Hx CVA   - cw ASA   - f/u lipid panel    89 yo F with PMHx depression w/ suicide attempt, rhabdomyolysis, CVA, w/ dysarthria, falls now wheel chair bound, admitted in Jan 2020 for AMS who presents for hypoxemic, hypercarbic respiratory failure 2/2 ADHF and aspiration PNA.

## 2020-10-24 NOTE — H&P ADULT - PROBLEM SELECTOR PLAN 6
- Metabolic encephalopathy like 2/2 hypercarbia, HF, hypoNa, PNA, hypoxia.   - AOx3 baseline per outpt notes.  - CTM

## 2020-10-24 NOTE — PROGRESS NOTE ADULT - PROBLEM SELECTOR PLAN 1
acute hypoxemic hypercapnic respiratory failure in the setting of volume overload and possible aspiration event.  - C/w BiPAP 10/5 at FiO2 50%; however, poor tidal volumes and continued somnolence.  - f/u repeat ABG  - Adjust NIV for increased IPAP  - Management of ADHF PNA as below acute hypoxemic hypercapnic respiratory failure in the setting of volume overload and possible aspiration event.  - C/w AVAPS 14/8, 400, 25, 40%  - ABG still with some respiratory acidosis  - Pulm consulted for AVAPS management

## 2020-10-24 NOTE — H&P ADULT - NSHPPHYSICALEXAM_GEN_ALL_CORE
ICU Vital Signs Last 24 Hrs  T(C): 36.2 (24 Oct 2020 00:29), Max: 36.5 (23 Oct 2020 14:29)  T(F): 97.1 (24 Oct 2020 00:29), Max: 97.7 (23 Oct 2020 14:29)  HR: 46 (24 Oct 2020 01:10) (43 - 59)  BP: 115/48 (24 Oct 2020 01:10) (101/44 - 196/63)  BP(mean): 105 (23 Oct 2020 23:55) (61 - 115)  ABP: --  ABP(mean): --  RR: 20 (24 Oct 2020 01:10) (17 - 30)  SpO2: 100% (24 Oct 2020 01:10) (89% - 100%)  ____________________  PHYSICAL EXAM:  · CONSTITUTIONAL:Thin frail cachectic. On NIV  · NECK:	No bruits; no thyromegaly. JVD mid next  ·RESPIRATORY:   Clear to auscultation. Good air movement.  no rales,rhonchi or wheeze. No increased work of breathing.   · CARDIOVASCULAR Bradycardic. no m/r/g  . GASTROINTESTINAL:  Soft, non tender. No organomegaly. No guarding.  · EXTREMITIES: Warm. Proximal edema. Distal erythema.   ·NEUROLOGICAL:   alert and oriented x 3; 5/5 strenght in b/l extremities. No sensory deficits.   · SKIN:	No lesions; no rash  . LYMPH NODES:	No lymphoadenopathy  · MUSCULOSKELETAL:   No calf tenderness  no joint swelling ICU Vital Signs Last 24 Hrs  T(C): 36.2 (24 Oct 2020 00:29), Max: 36.5 (23 Oct 2020 14:29)  T(F): 97.1 (24 Oct 2020 00:29), Max: 97.7 (23 Oct 2020 14:29)  HR: 46 (24 Oct 2020 01:10) (43 - 59)  BP: 115/48 (24 Oct 2020 01:10) (101/44 - 196/63)  BP(mean): 105 (23 Oct 2020 23:55) (61 - 115)  ABP: --  ABP(mean): --  RR: 20 (24 Oct 2020 01:10) (17 - 30)  SpO2: 100% (24 Oct 2020 01:10) (89% - 100%)  ____________________  PHYSICAL EXAM:  · CONSTITUTIONAL: Obese. On NIV  · NECK:	No bruits; no thyromegaly. JVD mid next  ·RESPIRATORY:  Poor inspiratory effort. Scattered rhonchi. No wheezing. No increased work of breathing.   · CARDIOVASCULAR Bradycardic. no m/r/g  . GASTROINTESTINAL:  Soft, non tender. No organomegaly. No guarding.  · EXTREMITIES: Warm. 3+ Proximal edema. Distal erythema.   ·NEUROLOGICAL:   alert and oriented x 0; Sleeping. Moving all four extremities on command.   · SKIN:	No lesions; no rash  . LYMPH NODES:	No lymphoadenopathy  · MUSCULOSKELETAL:   No calf tenderness  no joint swelling ICU Vital Signs Last 24 Hrs  T(C): 36.2 (24 Oct 2020 00:29), Max: 36.5 (23 Oct 2020 14:29)  T(F): 97.1 (24 Oct 2020 00:29), Max: 97.7 (23 Oct 2020 14:29)  HR: 46 (24 Oct 2020 01:10) (43 - 59)  BP: 115/48 (24 Oct 2020 01:10) (101/44 - 196/63)  BP(mean): 105 (23 Oct 2020 23:55) (61 - 115)  ABP: --  ABP(mean): --  RR: 20 (24 Oct 2020 01:10) (17 - 30)  SpO2: 100% (24 Oct 2020 01:10) (89% - 100%)  ____________________  PHYSICAL EXAM:  · CONSTITUTIONAL: Obese. On NIV  · NECK:	No bruits; no thyromegaly. JVD poorly visualized.   ·RESPIRATORY:  Poor inspiratory effort (). Scattered rhonchi. No wheezing. No increased work of breathing.   · CARDIOVASCULAR Bradycardic. no m/r/g  . GASTROINTESTINAL:  Soft, non tender. No organomegaly. No guarding.  · EXTREMITIES: Warm. 3+ Proximal edema. Distal erythema.   ·NEUROLOGICAL:   alert and oriented x 0; Sleeping. Moving all four extremities on command.   · SKIN:	No lesions; no rash  . LYMPH NODES:	No lymphoadenopathy  · MUSCULOSKELETAL:   No calf tenderness  no joint swelling ICU Vital Signs Last 24 Hrs  T(C): 36.2 (24 Oct 2020 00:29), Max: 36.5 (23 Oct 2020 14:29)  T(F): 97.1 (24 Oct 2020 00:29), Max: 97.7 (23 Oct 2020 14:29)  HR: 46 (24 Oct 2020 01:10) (43 - 59)  BP: 115/48 (24 Oct 2020 01:10) (101/44 - 196/63)  BP(mean): 105 (23 Oct 2020 23:55) (61 - 115)  ABP: --  ABP(mean): --  RR: 20 (24 Oct 2020 01:10) (17 - 30)  SpO2: 100% (24 Oct 2020 01:10) (89% - 100%)    T(C): 36.3 (10-24-20 @ 02:16), Max: 36.5 (10-23-20 @ 14:29)  HR: 49 (10-24-20 @ 02:16) (43 - 59)  BP: 149/76 (10-24-20 @ 02:16) (101/44 - 196/63)  RR: 17 (10-24-20 @ 02:16) (17 - 30)  SpO2: 100% (10-24-20 @ 02:16) (89% - 100%)    Constitutional: Obese. On NIV  Ears, Nose, Mouth, and Throat: normal external ears and nose, normal hearing, moist oral mucosa  Eyes: normal conjunctiva, EOMI, PERRL  Neck: supple, JVD poorly visualized  Respiratory: Poor inspiratory effort (). Scattered rhonchi. No wheezing. No increased work of breathing.   Cardiovascular: Bradycardic. no m/r/g, 3+ Proximal edema. 2+ Peripheral Pulses  Gastrointestinal: soft, nontender, nondistended, +BS, no hernia  Skin: warm, dry, no rash  Neurologic: sensation grossly intact, CN grossly intact, non-focal exam  Musculoskeletal: no clubbing, no cyanosis, no joint swelling  Psychiatric: AOX0, no agitation, anxiety

## 2020-10-24 NOTE — PROVIDER CONTACT NOTE (CRITICAL VALUE NOTIFICATION) - BACKGROUND
Pt. MICU reject. Admitted with Hypoxia and Hypercapneic respiratory failure r/t CHF and PNA. Pt. is DNR/DNI curently on AVAPS.

## 2020-10-24 NOTE — PROGRESS NOTE ADULT - SUBJECTIVE AND OBJECTIVE BOX
*******************************************************  Adriel Rivera MD PGY-1  Internal Medicine  Pager ([facility]) [phone] / (MUR) [phone]  *******************************************************  Patient is a 89y old  Female who presents with a chief complaint of hypoxia (24 Oct 2020 11:03)      SUBJECTIVE / OVERNIGHT EVENTS:  - Patient admitted overnight for acute hypoxemic hypercapnic respiratory failure and placed on AVAPS.  - Patient seen and evaluated at bedside.  - No events on telemetry  - Patient awakens to loud voice to to firm touch but is somnolent and falls back asleep. Unable to participate in exam.    ------------------------------------------------------------------------------------------------------------    MEDICATIONS  (STANDING):  aspirin enteric coated 81 milliGRAM(s) Oral daily  azithromycin  IVPB 500 milliGRAM(s) IV Intermittent every 24 hours  enoxaparin Injectable 40 milliGRAM(s) SubCutaneous daily  furosemide   Injectable 40 milliGRAM(s) IV Push daily  piperacillin/tazobactam IVPB.. 3.375 Gram(s) IV Intermittent every 8 hours  polyethylene glycol 3350 17 Gram(s) Oral two times a day    MEDICATIONS  (PRN):      ------------------------------------------------------------------------------------------------------------    OBJECTIVE:    CAPILLARY BLOOD GLUCOSE      POCT Blood Glucose.: 125 mg/dL (24 Oct 2020 07:58)  POCT Blood Glucose.: 78 mg/dL (24 Oct 2020 07:37)  POCT Blood Glucose.: 96 mg/dL (24 Oct 2020 06:01)  POCT Blood Glucose.: 88 mg/dL (23 Oct 2020 15:51)    I&O's Summary    23 Oct 2020 07:01  -  24 Oct 2020 07:00  --------------------------------------------------------  IN: 0 mL / OUT: 1200 mL / NET: -1200 mL    24 Oct 2020 07:01  -  24 Oct 2020 11:25  --------------------------------------------------------  IN: 0 mL / OUT: 700 mL / NET: -700 mL      Daily Height in cm: 165.1 (23 Oct 2020 13:08)    Daily     PHYSICAL EXAM:  T(F): 97.4, Max: 97.7 (10-23-20 @ 14:29)  HR: 53 (43 - 59)  BP: 154/78 (101/44 - 196/63)  RR: 19 (17 - 30)  SpO2: 95% (89% - 100%)      CONSTITUTIONAL: obese elderly woman on NIPPV  HEENT: NCAT, eyes tracking, PERRLA, no scleral icterus  RESPIRATORY/CHEST: course BS in all fields, scattered rhonci appreciated, no wheezing  CARDIO: unable to appreciate cardiac sounds due to body habitus and location of tele leads  VASCULAR: No lower extremity edema; Peripheral pulses are 2+ bilaterally; Capillary refill brisk  ABDOMEN: Soft, nontender to palpation, normoactive bowel sounds, no rebound/guarding; No hepatosplenomegaly  EXTREMITIES: hands/feet are cool to touch, and without cyanosis or clubbing  SKIN: no visible rashes, pallor, diaphoresis, or jaundice  NEURO: No focal deficits; moving all extremities  PSYCH: A+O to person, place, and time; affect appropriate; cooperative    ------------------------------------------------------------------------------------------------------------  LABS:                        12.3   9.72  )-----------( 198      ( 24 Oct 2020 04:30 )             39.4     10-24    123<L>  |  86<L>  |  21  ----------------------------<  82  5.5<H>   |  26  |  0.69    Ca    8.8      24 Oct 2020 03:40  Phos  4.8     10-24  Mg     1.9     10-24    TPro  6.6  /  Alb  3.5  /  TBili  0.6  /  DBili  x   /  AST  21  /  ALT  18  /  AlkPhos  105  10-23    PT/INR - ( 23 Oct 2020 13:30 )   PT: 13.0 SEC;   INR: 1.15          PTT - ( 23 Oct 2020 13:30 )  PTT:30.0 SEC      Urinalysis Basic - ( 23 Oct 2020 14:27 )    Color: LIGHT YELLOW / Appearance: CLEAR / S.009 / pH: 6.5  Gluc: 30 / Ketone: NEGATIVE  / Bili: NEGATIVE / Urobili: NORMAL   Blood: MODERATE / Protein: 50 / Nitrite: NEGATIVE   Leuk Esterase: LARGE / RBC: 6-10 / WBC 26-50   Sq Epi: OCC / Non Sq Epi: x / Bacteria: MANY          RADIOLOGY & ADDITIONAL TESTS:  Results Reviewed:     Imaging Personally Reviewed:  Electrocardiogram Personally Reviewed:      COORDINATION OF CARE:  Care discussed with consultants/other providers and notes reviewed [Y]/[N]:   Prior or Outpatient Records Reviewed [Y]/[N]:   ------------------------------------------------------------------------------------------------------------     *******************************************************  Adriel Rivera MD PGY-1  Internal Medicine  Pager ([facility]) [phone] / (LEF) [phone]  *******************************************************  Patient is a 89y old  Female who presents with a chief complaint of hypoxia (24 Oct 2020 11:03)      SUBJECTIVE / OVERNIGHT EVENTS:  - Patient admitted overnight for acute hypoxemic hypercapnic respiratory failure and placed on AVAPS.  - Patient seen and evaluated at bedside.  - No events on telemetry  - Patient awakens to loud voice to to firm touch but is somnolent and falls back asleep. Unable to participate in exam.    ------------------------------------------------------------------------------------------------------------    MEDICATIONS  (STANDING):  aspirin enteric coated 81 milliGRAM(s) Oral daily  azithromycin  IVPB 500 milliGRAM(s) IV Intermittent every 24 hours  enoxaparin Injectable 40 milliGRAM(s) SubCutaneous daily  furosemide   Injectable 40 milliGRAM(s) IV Push daily  piperacillin/tazobactam IVPB.. 3.375 Gram(s) IV Intermittent every 8 hours  polyethylene glycol 3350 17 Gram(s) Oral two times a day    MEDICATIONS  (PRN):      ------------------------------------------------------------------------------------------------------------    OBJECTIVE:    CAPILLARY BLOOD GLUCOSE      POCT Blood Glucose.: 125 mg/dL (24 Oct 2020 07:58)  POCT Blood Glucose.: 78 mg/dL (24 Oct 2020 07:37)  POCT Blood Glucose.: 96 mg/dL (24 Oct 2020 06:01)  POCT Blood Glucose.: 88 mg/dL (23 Oct 2020 15:51)    I&O's Summary    23 Oct 2020 07:01  -  24 Oct 2020 07:00  --------------------------------------------------------  IN: 0 mL / OUT: 1200 mL / NET: -1200 mL    24 Oct 2020 07:01  -  24 Oct 2020 11:25  --------------------------------------------------------  IN: 0 mL / OUT: 700 mL / NET: -700 mL      Daily Height in cm: 165.1 (23 Oct 2020 13:08)    Daily     PHYSICAL EXAM:  T(F): 97.4, Max: 97.7 (10-23-20 @ 14:29)  HR: 53 (43 - 59)  BP: 154/78 (101/44 - 196/63)  RR: 19 (17 - 30)  SpO2: 95% (89% - 100%)      CONSTITUTIONAL: obese elderly woman on NIPPV  HEENT: NCAT, eyes tracking, PERRLA, no scleral icterus  RESPIRATORY/CHEST: course BS in all fields, scattered rhonci appreciated, no wheezing  CARDIO: unable to appreciate cardiac sounds due to body habitus and location of tele leads  VASCULAR: No lower extremity edema; Peripheral pulses are 2+ bilaterally; Capillary refill brisk  ABDOMEN: Soft, nontender to palpation, normoactive bowel sounds, no rebound/guarding; No hepatosplenomegaly  EXTREMITIES: hands/feet are cool to touch, and without cyanosis or clubbing  SKIN: no visible rashes, pallor, diaphoresis, or jaundice  NEURO: No focal deficits; moving all extremities  PSYCH: unable to respond    ------------------------------------------------------------------------------------------------------------  LABS:                        12.3   9.72  )-----------( 198      ( 24 Oct 2020 04:30 )             39.4     10-24    123<L>  |  86<L>  |  21  ----------------------------<  82  5.5<H>   |  26  |  0.69    Ca    8.8      24 Oct 2020 03:40  Phos  4.8     10-24  Mg     1.9     10-24    TPro  6.6  /  Alb  3.5  /  TBili  0.6  /  DBili  x   /  AST  21  /  ALT  18  /  AlkPhos  105  10-23    PT/INR - ( 23 Oct 2020 13:30 )   PT: 13.0 SEC;   INR: 1.15          PTT - ( 23 Oct 2020 13:30 )  PTT:30.0 SEC      Urinalysis Basic - ( 23 Oct 2020 14:27 )    Color: LIGHT YELLOW / Appearance: CLEAR / S.009 / pH: 6.5  Gluc: 30 / Ketone: NEGATIVE  / Bili: NEGATIVE / Urobili: NORMAL   Blood: MODERATE / Protein: 50 / Nitrite: NEGATIVE   Leuk Esterase: LARGE / RBC: 6-10 / WBC 26-50   Sq Epi: OCC / Non Sq Epi: x / Bacteria: MANY          RADIOLOGY & ADDITIONAL TESTS:  Results Reviewed:     Imaging Personally Reviewed:  Electrocardiogram Personally Reviewed:      COORDINATION OF CARE:  Care discussed with consultants/other providers and notes reviewed [Y]/[N]:   Prior or Outpatient Records Reviewed [Y]/[N]:   ------------------------------------------------------------------------------------------------------------

## 2020-10-24 NOTE — PROGRESS NOTE ADULT - ASSESSMENT
89 yo F with PMHx depression w/ suicide attempt, rhabdomyolysis, CVA, w/ dysarthria, falls now wheel chair bound, admitted in Jan 2020 for AMS who presents for hypoxemic, hypercarbic respiratory failure 2/2 ADHF and aspiration PNA.

## 2020-10-24 NOTE — H&P ADULT - PROBLEM SELECTOR PLAN 10
Unable to reach family for collateral, Older son OLDEST SON ALEJO -[phone]    PCP Allison Ferrer Unable to reach family for collateral, Older son OLDEST SON ALEJO -[phone]    PCP Allison BYRNE form in CHART, DNR/DNI

## 2020-10-24 NOTE — H&P ADULT - NSHPLABSRESULTS_GEN_ALL_CORE
Personally reviewed labs, imaging, EKG                        12.4   12.15 )-----------( 227      ( 23 Oct 2020 13:30 )             37.6     10    122<L>  |  85<L>  |  21  ----------------------------<  88  5.5<H>   |  30  |  0.75    Ca    9.5      23 Oct 2020 18:40    TPro  6.6  /  Alb  3.5  /  TBili  0.6  /  DBili  x   /  AST  21  /  ALT  18  /  AlkPhos  105  10    PT/INR - ( 23 Oct 2020 13:30 )   PT: 13.0 SEC;   INR: 1.15          PTT - ( 23 Oct 2020 13:30 )  PTT:30.0 SEC        Urinalysis Basic - ( 23 Oct 2020 14:27 )    Color: LIGHT YELLOW / Appearance: CLEAR / S.009 / pH: 6.5  Gluc: 30 / Ketone: NEGATIVE  / Bili: NEGATIVE / Urobili: NORMAL   Blood: MODERATE / Protein: 50 / Nitrite: NEGATIVE   Leuk Esterase: LARGE / RBC: 6-10 / WBC 26-50   Sq Epi: OCC / Non Sq Epi: x / Bacteria: MANY    Blood Gas Arterial Comprehensive (10.23.20 @ 18:42)   pH, Arterial: 7.26 pH   pCO2, Arterial: 72 mmHg   pO2, Arterial: 116 mmHg   HCO3, Arterial: 27 mmol/L       Blood Gas Profile w/Lytes - Arterial (10.23.20 @ 21:05)   pH, Arterial: 7.30 pH   pCO2, Arterial: 66 mmHg   pO2, Arterial: 107 mmHg   HCO3, Arterial: 28 mmol/L       13:30 - VBG - pH: 7.27  | pCO2: 70    | pO2: 49    | Lactate: 1.0    Troponin T, High Sensitivity (10.23.20 @ 18:40)   Troponin T, High Sensitivity: 38:     Troponin T, High Sensitivity (10.23.20 @ 13:30)   Troponin T, High Sensitivity: 39:    Serum Pro-Brain Natriuretic Peptide: [id number] pg/mL (10-23-20 @ 13:30)      Chest X Ray 10-24-20 (Personal Read): Cardiomegaly. Focal consolidation in LLL. RLL opacification. Bilateral patch opacities with apical clearance.   ECG 10-24-20 @ 01:57 (Personal Read): Sinus bradycardia. No repolarization abnormalities.  unchanged from prior 20 Personally reviewed labs, imaging, EKG                        12.4   12.15 )-----------( 227      ( 23 Oct 2020 13:30 )             37.6     10    122<L>  |  85<L>  |  21  ----------------------------<  88  5.5<H>   |  30  |  0.75    Ca    9.5      23 Oct 2020 18:40    TPro  6.6  /  Alb  3.5  /  TBili  0.6  /  DBili  x   /  AST  21  /  ALT  18  /  AlkPhos  105  10    PT/INR - ( 23 Oct 2020 13:30 )   PT: 13.0 SEC;   INR: 1.15          PTT - ( 23 Oct 2020 13:30 )  PTT:30.0 SEC        Urinalysis Basic - ( 23 Oct 2020 14:27 )    Color: LIGHT YELLOW / Appearance: CLEAR / S.009 / pH: 6.5  Gluc: 30 / Ketone: NEGATIVE  / Bili: NEGATIVE / Urobili: NORMAL   Blood: MODERATE / Protein: 50 / Nitrite: NEGATIVE   Leuk Esterase: LARGE / RBC: 6-10 / WBC 26-50   Sq Epi: OCC / Non Sq Epi: x / Bacteria: MANY    Blood Gas Arterial Comprehensive (10.23.20 @ 18:42)   pH, Arterial: 7.26 pH   pCO2, Arterial: 72 mmHg   pO2, Arterial: 116 mmHg   HCO3, Arterial: 27 mmol/L       Blood Gas Profile w/Lytes - Arterial (10.23.20 @ 21:05)   pH, Arterial: 7.30 pH   pCO2, Arterial: 66 mmHg   pO2, Arterial: 107 mmHg   HCO3, Arterial: 28 mmol/L       13:30 - VBG - pH: 7.27  | pCO2: 70    | pO2: 49    | Lactate: 1.0    Troponin T, High Sensitivity (10.23.20 @ 18:40)   Troponin T, High Sensitivity: 38:     Troponin T, High Sensitivity (10.23.20 @ 13:30)   Troponin T, High Sensitivity: 39:    Serum Pro-Brain Natriuretic Peptide: 02081 pg/mL (10-23-20 @ 13:30)      Chest X Ray 10-24-20 (Personal Read): Cardiomegaly. Focal consolidation in LLL. RLL opacification. Bilateral patch opacities with apical clearance.   ECG 10-24-20 @ 01:57 (Personal Read): Sinus bradycardia. No peaked T waves. No repolarization abnormalities.  unchanged from prior 20

## 2020-10-24 NOTE — H&P ADULT - PROBLEM SELECTOR PLAN 1
acute hypoxemic hypercapnic respiratory failure in the setting of volume overload and possible aspiration event.  - C/w BiPAP 10/5 at FiO2 50%; however, poor tidal volumes and continued somnolence.  - f/u repeat ABG  - Adjust NIV for increased IPAP  - Management of ADHF PNA as below

## 2020-10-24 NOTE — H&P ADULT - PROBLEM SELECTOR PLAN 5
- HyperK of unclear etiology, perhaps MOISES  - s/p Calcium gluconate, insulin in ED  - reassess BMP

## 2020-10-24 NOTE — PROGRESS NOTE ADULT - PROBLEM SELECTOR PLAN 8
- pt w/ grossly positive UA  - f/u UCX  - Maintain narayan for UOP monitoring.   - Bowel regimen, stool count given hx of constipation  - Zosyn as above

## 2020-10-24 NOTE — PROGRESS NOTE ADULT - PROBLEM SELECTOR PLAN 4
- presumably hypervolemic hyponatremia in setting of volume overload, likely chronic would correct <6-8meq per 24h  - Urine studies c/w SIADH as well  - Fluid restrict 1.5L +/- salt tabs. Correct 6-8meq/day  - Diuresis as above - presumably hypervolemic hyponatremia in setting of volume overload, likely chronic would correct <6-8meq per 24h  - Given hypervolemic state and elevated urine Na and FENa - concern for development of renal failure vs SIADH  - Fluid restrict 1.5L +/- salt tabs. Correct 6-8meq/day  - Diuresis as above

## 2020-10-24 NOTE — H&P ADULT - PROBLEM SELECTOR PLAN 3
- pt w/ hx dysarthria, CAP, now w/ focal consolidations in RML RLL  - f/u sputum cx  - f/u urine legionella  - cw zosyn (10/23 - )  - start azithro (10/23 - )

## 2020-10-24 NOTE — PROGRESS NOTE ADULT - ATTENDING COMMENTS
Patient seen and examined at bedside. Plan discussed with residents   89 yo F h/o depression w/ suicide attempt, CVA, w/ dysarthria,wheel chair bound presenting with shortness of breath. Found to have hypoxic hypercarbic respiratory failure 2/2 ADHF and aspiration PNA.   Resp failutre with hypoxia and hypercapnia- MS improving, O2 sat wnl but minimal improvement in pCO2. Will continue AVAPS 14/8, 400, 25, 40%, Repeat VBG in evening.   PNA- Concern for aspiration. c/w zosyn  ADHF- Continue IV Lasix. Monitor I/O, daily weight

## 2020-10-24 NOTE — H&P ADULT - HISTORY OF PRESENT ILLNESS
89 yo F with PMHx depression w/ suicide attempt, rhabdomyolysis, CVA, w/ dysarthria, falls now wheel chair bound, admitted in Jan 2020 for AMS, CAP,  UTI, presents to the ED via EMS for dyspnea and hypoxia to 65% on RA. Was placed on CPAP by EMS with improvement. Was also given sublingual nitro x2 for hypertension. History limited due to pt’s illness. Unable to contact family  Left VM for son, ALEJO -[phone]  ED Course: pt desated while on bipap 10/5, 40%. FIO2 increased to 90%, i nitro gtt started due to desaturations. MICU consulted and deemed stable for floor.     For hyperkalemia: calcium gluconate D50, Regular insulin 5U, lasix 20, nitro gtt  For CAP: zosyn 89 yo F with PMHx depression w/ suicide attempt, rhabdomyolysis, CVA, w/ dysarthria, falls now wheel chair bound, admitted in Jan 2020 for AMS, CAP,  UTI, presents to the ED via EMS for dyspnea and hypoxia to 65% on RA. Was placed on CPAP by EMS with improvement. Was also given sublingual nitro x2 for hypertension. History limited due to pt’s illness. Unable to contact family  Left VM for son, ALEJO -[phone]    ED Course: pt desated while on bipap 10/5, 40%. FIO2 increased to 90%, i nitro gtt started due to desaturations. MICU consulted and deemed stable for floor.     For hyperkalemia: calcium gluconate D50, Regular insulin 5U, lasix 20, nitro gtt  For CAP: zosyn

## 2020-10-24 NOTE — PROGRESS NOTE ADULT - PROBLEM SELECTOR PLAN 10
Unable to reach family for collateral, Older son OLDEST SON ALEJO -[phone]    PCP Allison BYRNE form in CHART, DNR/DNI

## 2020-10-24 NOTE — PROGRESS NOTE ADULT - PROBLEM SELECTOR PLAN 2
- pt in acute decompensated heart failure, appears hypervolemic on exam. Unclear etiology. ACS workup negative  - Also with inappropriate bradycardia c/f SSS  - CXR w/ pulmonary edema  - Elevated BNP  - f/u TTE  - Strict IOs   - Daily weights  -c/w lasix 40 IVP qd (lasix naive) pt in acute decompensated heart failure, appears hypervolemic on exam. Unclear etiology. ACS workup negative    - CXR w/ pulmonary edema  - Elevated BNP  - f/u TTE  - Strict IOs   - Daily weights  - c/w lasix 40 IVP qd (lasix naive)

## 2020-10-25 LAB
-  AMIKACIN: SIGNIFICANT CHANGE UP
-  AZTREONAM: SIGNIFICANT CHANGE UP
-  CEFEPIME: SIGNIFICANT CHANGE UP
-  CEFTAZIDIME: SIGNIFICANT CHANGE UP
-  CIPROFLOXACIN: SIGNIFICANT CHANGE UP
-  GENTAMICIN: SIGNIFICANT CHANGE UP
-  IMIPENEM: SIGNIFICANT CHANGE UP
-  LEVOFLOXACIN: SIGNIFICANT CHANGE UP
-  MEROPENEM: SIGNIFICANT CHANGE UP
-  PIPERACILLIN/TAZOBACTAM: SIGNIFICANT CHANGE UP
-  TOBRAMYCIN: SIGNIFICANT CHANGE UP
ALBUMIN SERPL ELPH-MCNC: 3 G/DL — LOW (ref 3.3–5)
ALP SERPL-CCNC: 95 U/L — SIGNIFICANT CHANGE UP (ref 40–120)
ALT FLD-CCNC: 17 U/L — SIGNIFICANT CHANGE UP (ref 4–33)
ANION GAP SERPL CALC-SCNC: 15 MMO/L — HIGH (ref 7–14)
AST SERPL-CCNC: 23 U/L — SIGNIFICANT CHANGE UP (ref 4–32)
BASE EXCESS BLDV CALC-SCNC: 8.5 MMOL/L — SIGNIFICANT CHANGE UP
BASOPHILS # BLD AUTO: 0.04 K/UL — SIGNIFICANT CHANGE UP (ref 0–0.2)
BASOPHILS NFR BLD AUTO: 0.5 % — SIGNIFICANT CHANGE UP (ref 0–2)
BILIRUB SERPL-MCNC: 0.6 MG/DL — SIGNIFICANT CHANGE UP (ref 0.2–1.2)
BLOOD GAS VENOUS - CREATININE: 0.75 MG/DL — SIGNIFICANT CHANGE UP (ref 0.5–1.3)
BUN SERPL-MCNC: 21 MG/DL — SIGNIFICANT CHANGE UP (ref 7–23)
CALCIUM SERPL-MCNC: 8.8 MG/DL — SIGNIFICANT CHANGE UP (ref 8.4–10.5)
CHLORIDE BLDV-SCNC: 85 MMOL/L — LOW (ref 96–108)
CHLORIDE SERPL-SCNC: 85 MMOL/L — LOW (ref 98–107)
CHOLEST SERPL-MCNC: 123 MG/DL — SIGNIFICANT CHANGE UP (ref 120–199)
CO2 SERPL-SCNC: 24 MMOL/L — SIGNIFICANT CHANGE UP (ref 22–31)
CREAT SERPL-MCNC: 0.73 MG/DL — SIGNIFICANT CHANGE UP (ref 0.5–1.3)
CULTURE RESULTS: SIGNIFICANT CHANGE UP
DIRECT LDL: 40 MG/DL — SIGNIFICANT CHANGE UP
EOSINOPHIL # BLD AUTO: 0.18 K/UL — SIGNIFICANT CHANGE UP (ref 0–0.5)
EOSINOPHIL NFR BLD AUTO: 2.3 % — SIGNIFICANT CHANGE UP (ref 0–6)
GAS PNL BLDV: 126 MMOL/L — LOW (ref 136–146)
GLUCOSE BLDC GLUCOMTR-MCNC: 103 MG/DL — HIGH (ref 70–99)
GLUCOSE BLDC GLUCOMTR-MCNC: 109 MG/DL — HIGH (ref 70–99)
GLUCOSE BLDC GLUCOMTR-MCNC: 134 MG/DL — HIGH (ref 70–99)
GLUCOSE BLDC GLUCOMTR-MCNC: 65 MG/DL — LOW (ref 70–99)
GLUCOSE BLDC GLUCOMTR-MCNC: 65 MG/DL — LOW (ref 70–99)
GLUCOSE BLDC GLUCOMTR-MCNC: 66 MG/DL — LOW (ref 70–99)
GLUCOSE BLDC GLUCOMTR-MCNC: 73 MG/DL — SIGNIFICANT CHANGE UP (ref 70–99)
GLUCOSE BLDC GLUCOMTR-MCNC: 78 MG/DL — SIGNIFICANT CHANGE UP (ref 70–99)
GLUCOSE BLDC GLUCOMTR-MCNC: 81 MG/DL — SIGNIFICANT CHANGE UP (ref 70–99)
GLUCOSE BLDV-MCNC: 70 MG/DL — SIGNIFICANT CHANGE UP (ref 70–99)
GLUCOSE SERPL-MCNC: 66 MG/DL — LOW (ref 70–99)
HBA1C BLD-MCNC: 5.6 % — SIGNIFICANT CHANGE UP (ref 4–5.6)
HCO3 BLDV-SCNC: 29 MMOL/L — HIGH (ref 20–27)
HCT VFR BLD CALC: 41 % — SIGNIFICANT CHANGE UP (ref 34.5–45)
HCT VFR BLDV CALC: 42.2 % — SIGNIFICANT CHANGE UP (ref 34.5–45)
HDLC SERPL-MCNC: 74 MG/DL — HIGH (ref 45–65)
HGB BLD-MCNC: 13.1 G/DL — SIGNIFICANT CHANGE UP (ref 11.5–15.5)
HGB BLDV-MCNC: 13.7 G/DL — SIGNIFICANT CHANGE UP (ref 11.5–15.5)
IMM GRANULOCYTES NFR BLD AUTO: 0.6 % — SIGNIFICANT CHANGE UP (ref 0–1.5)
LACTATE BLDV-MCNC: 1.6 MMOL/L — SIGNIFICANT CHANGE UP (ref 0.5–2)
LYMPHOCYTES # BLD AUTO: 0.87 K/UL — LOW (ref 1–3.3)
LYMPHOCYTES # BLD AUTO: 11.3 % — LOW (ref 13–44)
MCHC RBC-ENTMCNC: 28.7 PG — SIGNIFICANT CHANGE UP (ref 27–34)
MCHC RBC-ENTMCNC: 32 % — SIGNIFICANT CHANGE UP (ref 32–36)
MCV RBC AUTO: 89.9 FL — SIGNIFICANT CHANGE UP (ref 80–100)
METHOD TYPE: SIGNIFICANT CHANGE UP
MONOCYTES # BLD AUTO: 0.81 K/UL — SIGNIFICANT CHANGE UP (ref 0–0.9)
MONOCYTES NFR BLD AUTO: 10.5 % — SIGNIFICANT CHANGE UP (ref 2–14)
NEUTROPHILS # BLD AUTO: 5.77 K/UL — SIGNIFICANT CHANGE UP (ref 1.8–7.4)
NEUTROPHILS NFR BLD AUTO: 74.8 % — SIGNIFICANT CHANGE UP (ref 43–77)
NRBC # FLD: 0 K/UL — SIGNIFICANT CHANGE UP (ref 0–0)
ORGANISM # SPEC MICROSCOPIC CNT: SIGNIFICANT CHANGE UP
ORGANISM # SPEC MICROSCOPIC CNT: SIGNIFICANT CHANGE UP
PCO2 BLDV: 77 MMHG — HIGH (ref 41–51)
PH BLDV: 7.28 PH — LOW (ref 7.32–7.43)
PLATELET # BLD AUTO: 192 K/UL — SIGNIFICANT CHANGE UP (ref 150–400)
PMV BLD: 10.1 FL — SIGNIFICANT CHANGE UP (ref 7–13)
PO2 BLDV: 36 MMHG — SIGNIFICANT CHANGE UP (ref 35–40)
POTASSIUM BLDV-SCNC: 4.1 MMOL/L — SIGNIFICANT CHANGE UP (ref 3.4–4.5)
POTASSIUM SERPL-MCNC: 4.4 MMOL/L — SIGNIFICANT CHANGE UP (ref 3.5–5.3)
POTASSIUM SERPL-SCNC: 4.4 MMOL/L — SIGNIFICANT CHANGE UP (ref 3.5–5.3)
PROT SERPL-MCNC: 6.4 G/DL — SIGNIFICANT CHANGE UP (ref 6–8.3)
RBC # BLD: 4.56 M/UL — SIGNIFICANT CHANGE UP (ref 3.8–5.2)
RBC # FLD: 14.1 % — SIGNIFICANT CHANGE UP (ref 10.3–14.5)
SAO2 % BLDV: 64.1 % — SIGNIFICANT CHANGE UP (ref 60–85)
SODIUM SERPL-SCNC: 124 MMOL/L — LOW (ref 135–145)
SPECIMEN SOURCE: SIGNIFICANT CHANGE UP
TRIGL SERPL-MCNC: 53 MG/DL — SIGNIFICANT CHANGE UP (ref 10–149)
TSH SERPL-MCNC: 3.11 UIU/ML — SIGNIFICANT CHANGE UP (ref 0.27–4.2)
WBC # BLD: 7.72 K/UL — SIGNIFICANT CHANGE UP (ref 3.8–10.5)
WBC # FLD AUTO: 7.72 K/UL — SIGNIFICANT CHANGE UP (ref 3.8–10.5)

## 2020-10-25 PROCEDURE — 99233 SBSQ HOSP IP/OBS HIGH 50: CPT | Mod: GC

## 2020-10-25 RX ORDER — DEXTROSE 50 % IN WATER 50 %
12.5 SYRINGE (ML) INTRAVENOUS ONCE
Refills: 0 | Status: COMPLETED | OUTPATIENT
Start: 2020-10-25 | End: 2020-10-25

## 2020-10-25 RX ORDER — DEXTROSE 50 % IN WATER 50 %
25 SYRINGE (ML) INTRAVENOUS ONCE
Refills: 0 | Status: COMPLETED | OUTPATIENT
Start: 2020-10-25 | End: 2020-10-25

## 2020-10-25 RX ORDER — DEXTROSE 50 % IN WATER 50 %
50 SYRINGE (ML) INTRAVENOUS ONCE
Refills: 0 | Status: DISCONTINUED | OUTPATIENT
Start: 2020-10-25 | End: 2020-10-25

## 2020-10-25 RX ADMIN — Medication 12.5 GRAM(S): at 09:45

## 2020-10-25 RX ADMIN — PIPERACILLIN AND TAZOBACTAM 25 GRAM(S): 4; .5 INJECTION, POWDER, LYOPHILIZED, FOR SOLUTION INTRAVENOUS at 15:04

## 2020-10-25 RX ADMIN — PIPERACILLIN AND TAZOBACTAM 25 GRAM(S): 4; .5 INJECTION, POWDER, LYOPHILIZED, FOR SOLUTION INTRAVENOUS at 07:55

## 2020-10-25 RX ADMIN — Medication 25 GRAM(S): at 17:43

## 2020-10-25 RX ADMIN — Medication 40 MILLIGRAM(S): at 06:17

## 2020-10-25 RX ADMIN — ENOXAPARIN SODIUM 40 MILLIGRAM(S): 100 INJECTION SUBCUTANEOUS at 14:54

## 2020-10-25 RX ADMIN — PIPERACILLIN AND TAZOBACTAM 25 GRAM(S): 4; .5 INJECTION, POWDER, LYOPHILIZED, FOR SOLUTION INTRAVENOUS at 22:16

## 2020-10-25 NOTE — PROGRESS NOTE ADULT - PROBLEM SELECTOR PLAN 3
pt w/ hx dysarthria, CAP, now w/ focal consolidations in RML RLL    - f/u sputum cx  - f/u urine legionella  - c/w zosyn (10/23 - )  - d/c lc (10/23-10/24) pt w/ hx dysarthria, CAP, now w/ focal consolidations in RML RLL  - f/u sputum cx  - f/u urine legionella  - c/w zosyn (10/23 - )  - d/c lc (10/23-10/24)

## 2020-10-25 NOTE — CONSULT NOTE ADULT - ATTENDING COMMENTS
Patient is seen and examined. Case discussed with ER team.  89 yo with hx of CVA, non-ambulatory at baseline with limited medical follow up admitted with acute hypoxemic hypercapnic respiratory failure in the setting of volume overload and possible aspiration event. Patient was hypoxemic at home, was placed on NIV, now awake, breathing comfortably and saturating well, mental status appears to be at baseline. Would suggest diuresis given elevated proBNP and e/o volume overload on exam, check sputum cultures/urine legionella/ua, would suggest Zosyn/Azithro for now. Treat hyperkalemia, repeat BMP, correct Na slowly as likely a chronic issue. Would give a trial off NIV, can use at night and prn.   Not MICU candidate at this time, please reconsult as needed.
Patient is seen and examined. Case discussed with ER team.  89 yo with hx of CVA, non-ambulatory at baseline with limited medical follow up admitted with acute hypoxemic hypercapnic respiratory failure in the setting of volume overload and possible aspiration event. Patient was hypoxemic at home, was placed on NIV, now awake, breathing comfortably and saturating well, mental status appears to be at baseline. Would suggest diuresis given elevated proBNP and e/o volume overload on exam, check sputum cultures/urine legionella/ua, would suggest Zosyn/Azithro for now. Treat hyperkalemia, repeat BMP, correct Na slowly as likely a chronic issue. Would give a trial off NIV, can use at night and prn.   Not MICU candidate at this time, please reconsult as needed.     Plan discussed with MICU.
Personally saw and examined patient  Labs and vitals reviewed  Agree with assessment and plan above  multiple concurrent issues, active UTI, now on abx  also with dyspnea, now requiring non invasive mechanical ventilation  volume overloaded on exam, cont IV diuresis  will follow

## 2020-10-25 NOTE — PHYSICAL THERAPY INITIAL EVALUATION ADULT - RANGE OF MOTION EXAMINATION, REHAB EVAL
bilateral lower extremity ROM was WFL (within functional limits)/assessed via active assistive ROM/bilateral upper extremity ROM was WFL (within functional limits)

## 2020-10-25 NOTE — CONSULT NOTE ADULT - SUBJECTIVE AND OBJECTIVE BOX
Date of Admission:  10/24/20  CHIEF COMPLAINT:    HISTORY OF PRESENT ILLNESS:     89 yo F with PMHx depression w/ suicide attempt, rhabdomyolysis, CVA, w/ dysarthria, falls now wheel chair bound, admitted in Jan 2020 for AMS, CAP,  UTI, presents to the ED via EMS for dyspnea and hypoxia to 65% on RA. In ER, patient placed on BIPAP and given nitro sl x 2.     No Known Allergies    Intolerances    MEDICATIONS:  aspirin enteric coated 81 milliGRAM(s) Oral daily  enoxaparin Injectable 40 milliGRAM(s) SubCutaneous daily  furosemide   Injectable 40 milliGRAM(s) IV Push daily  hydrALAZINE 10 milliGRAM(s) Oral every 8 hours  isosorbide   dinitrate Tablet (ISORDIL) 5 milliGRAM(s) Oral three times a day  piperacillin/tazobactam IVPB.. 3.375 Gram(s) IV Intermittent every 8 hours  polyethylene glycol 3350 17 Gram(s) Oral two times a day  dextrose 40% Gel 15 Gram(s) Oral once PRN  dextrose 50% Injectable 12.5 Gram(s) IV Push once  dextrose 50% Injectable 25 Gram(s) IV Push once  dextrose 50% Injectable 25 Gram(s) IV Push once  glucagon  Injectable 1 milliGRAM(s) IntraMuscular once PRN    dextrose 5%. 1000 milliLiter(s) IV Continuous <Continuous>      PAST MEDICAL & SURGICAL HISTORY:  Wheelchair bound    History of rhabdomyolysis    Attempted suicide    Depression    Constipation    Urinary incontinence    Stroke    No pertinent past medical history    S/P cholecystectomy        FAMILY HISTORY:  No pertinent family history in first degree relatives        SOCIAL HISTORY:    [ ] Non-smoker  [ ] Smoker  [ ] Alcohol      REVIEW OF SYSTEMS:  See HPI. Otherwise, 10 point ROS done and otherwise negative.      T(C): 36.4 (10-25-20 @ 14:50), Max: 36.6 (10-24-20 @ 22:00)  HR: 58 (10-25-20 @ 15:03) (48 - 72)  BP: 123/69 (10-25-20 @ 14:50) (123/69 - 156/73)  RR: 16 (10-25-20 @ 14:50) (15 - 20)  SpO2: 100% (10-25-20 @ 15:03) (97% - 100%)  Wt(kg): --  I&O's Summary    24 Oct 2020 07:01  -  25 Oct 2020 07:00  --------------------------------------------------------  IN: 0 mL / OUT: 1950 mL / NET: -1950 mL    25 Oct 2020 07:01  -  25 Oct 2020 16:58  --------------------------------------------------------  IN: 0 mL / OUT: 2600 mL / NET: -2600 mL        Physical Exam:  General: NAD  Cardiovascular: Normal S1 S2, No JVD, No murmurs, No edema  Respiratory: Lungs clear to auscultation	  Gastrointestinal:  Soft, Non-tender, + BS	  Skin: warm and dry, No rashes, No ecchymoses, No cyanosis	  Extremities:  No clubbing, cyanosis or edema  Vascular: Peripheral pulses palpable 2+ bilaterally    LABS:	   	    CBC Full  -  ( 25 Oct 2020 07:00 )  WBC Count : 7.72 K/uL  Hemoglobin : 13.1 g/dL  Hematocrit : 41.0 %  Platelet Count - Automated : 192 K/uL  Mean Cell Volume : 89.9 fL  Mean Cell Hemoglobin : 28.7 pg  Mean Cell Hemoglobin Concentration : 32.0 %  Auto Neutrophil # : 5.77 K/uL  Auto Lymphocyte # : 0.87 K/uL  Auto Monocyte # : 0.81 K/uL  Auto Eosinophil # : 0.18 K/uL  Auto Basophil # : 0.04 K/uL  Auto Neutrophil % : 74.8 %  Auto Lymphocyte % : 11.3 %  Auto Monocyte % : 10.5 %  Auto Eosinophil % : 2.3 %  Auto Basophil % : 0.5 %    10-25    124<L>  |  85<L>  |  21  ----------------------------<  66<L>  4.4   |  24  |  0.73  10-24    124<L>  |  86<L>  |  21  ----------------------------<  67<L>  4.8   |  29  |  0.74    Ca    8.8      25 Oct 2020 07:23  Ca    8.6      24 Oct 2020 13:20  Phos  4.8     10-24  Mg     1.9     10-24    TPro  6.4  /  Alb  3.0<L>  /  TBili  0.6  /  DBili  x   /  AST  23  /  ALT  17  /  AlkPhos  95  10-25      proBNP:   Lipid Profile:   HgA1c:   TSH: Thyroid Stimulating Hormone, Serum: 3.11 uIU/mL (10-25 @ 07:23)        CARDIAC MARKERS:            TELEMETRY: 	    ECG:  	  RADIOLOGY:  OTHER: 	    PREVIOUS DIAGNOSTIC TESTING:    [ ] Echocardiogram:  [ ]  Catheterization:  [ ] Stress Test:  	  	  ASSESSMENT/PLAN: 	    Anna Cash NP [id number] Date of Admission:  10/24/20  CHIEF COMPLAINT:  hypoxemia  HISTORY OF PRESENT ILLNESS:     89 yo F with PMHx depression w/ suicide attempt, rhabdomyolysis, CVA, w/ dysarthria, falls now wheel chair bound, admitted in Jan 2020 for AMS, CAP,  UTI, presents to the ED via EMS for dyspnea and hypoxia to 65% on RA. In ER, patient placed on BIPAP and given nitro sl x 2.     No Known Allergies    Intolerances    MEDICATIONS:  aspirin enteric coated 81 milliGRAM(s) Oral daily  enoxaparin Injectable 40 milliGRAM(s) SubCutaneous daily  furosemide   Injectable 40 milliGRAM(s) IV Push daily  hydrALAZINE 10 milliGRAM(s) Oral every 8 hours  isosorbide   dinitrate Tablet (ISORDIL) 5 milliGRAM(s) Oral three times a day  piperacillin/tazobactam IVPB.. 3.375 Gram(s) IV Intermittent every 8 hours  polyethylene glycol 3350 17 Gram(s) Oral two times a day  dextrose 40% Gel 15 Gram(s) Oral once PRN  dextrose 50% Injectable 12.5 Gram(s) IV Push once  dextrose 50% Injectable 25 Gram(s) IV Push once  dextrose 50% Injectable 25 Gram(s) IV Push once  glucagon  Injectable 1 milliGRAM(s) IntraMuscular once PRN    dextrose 5%. 1000 milliLiter(s) IV Continuous <Continuous>      PAST MEDICAL & SURGICAL HISTORY:  Wheelchair bound    History of rhabdomyolysis    Attempted suicide    Depression    Constipation    Urinary incontinence    Stroke    No pertinent past medical history    S/P cholecystectomy        FAMILY HISTORY:  No pertinent family history in first degree relatives        SOCIAL HISTORY:    [ ] Non-smoker  [ ] Smoker  [ ] Alcohol      REVIEW OF SYSTEMS:  See HPI. Otherwise, 10 point ROS done and otherwise negative.      T(C): 36.4 (10-25-20 @ 14:50), Max: 36.6 (10-24-20 @ 22:00)  HR: 58 (10-25-20 @ 15:03) (48 - 72)  BP: 123/69 (10-25-20 @ 14:50) (123/69 - 156/73)  RR: 16 (10-25-20 @ 14:50) (15 - 20)  SpO2: 100% (10-25-20 @ 15:03) (97% - 100%)  Wt(kg): --  I&O's Summary    24 Oct 2020 07:01  -  25 Oct 2020 07:00  --------------------------------------------------------  IN: 0 mL / OUT: 1950 mL / NET: -1950 mL    25 Oct 2020 07:01  -  25 Oct 2020 16:58  --------------------------------------------------------  IN: 0 mL / OUT: 2600 mL / NET: -2600 mL        Physical Exam:  General: obese, NAD  Cardiovascular: Normal S1 S2, No JVD, No murmurs, No edema  Respiratory: bibasilar crackles  Gastrointestinal:  obese abdomen, Soft, Non-tender, + BS	  Skin: warm and dry, No rashes, No ecchymoses, No cyanosis	  Extremities: very tiny ankles, upper extremity edema, arms are +2  No clubbing, cyanosis or edema  Vascular: Peripheral pulses palpable 2+ bilaterally    LABS:	   	    CBC Full  -  ( 25 Oct 2020 07:00 )  WBC Count : 7.72 K/uL  Hemoglobin : 13.1 g/dL  Hematocrit : 41.0 %  Platelet Count - Automated : 192 K/uL  Mean Cell Volume : 89.9 fL  Mean Cell Hemoglobin : 28.7 pg  Mean Cell Hemoglobin Concentration : 32.0 %  Auto Neutrophil # : 5.77 K/uL  Auto Lymphocyte # : 0.87 K/uL  Auto Monocyte # : 0.81 K/uL  Auto Eosinophil # : 0.18 K/uL  Auto Basophil # : 0.04 K/uL  Auto Neutrophil % : 74.8 %  Auto Lymphocyte % : 11.3 %  Auto Monocyte % : 10.5 %  Auto Eosinophil % : 2.3 %  Auto Basophil % : 0.5 %    10-25    124<L>  |  85<L>  |  21  ----------------------------<  66<L>  4.4   |  24  |  0.73  10-24    124<L>  |  86<L>  |  21  ----------------------------<  67<L>  4.8   |  29  |  0.74    Ca    8.8      25 Oct 2020 07:23  Ca    8.6      24 Oct 2020 13:20  Phos  4.8     10-24  Mg     1.9     10-24    TPro  6.4  /  Alb  3.0<L>  /  TBili  0.6  /  DBili  x   /  AST  23  /  ALT  17  /  AlkPhos  95  10-25      proBNP:   Lipid Profile:   HgA1c:   TSH: Thyroid Stimulating Hormone, Serum: 3.11 uIU/mL (10-25 @ 07:23)      EKG: sinus bradycardia

## 2020-10-25 NOTE — PROGRESS NOTE ADULT - SUBJECTIVE AND OBJECTIVE BOX
Camilo Uriostegui MD  Internal Medicine, PGY2  [facility]: [phone] / [facility]: [id number]    Patient is a 89y old  Female who presents with a chief complaint of hypoxia (24 Oct 2020 11:03)    OVERNIGHT EVENTS: NAEON    SUBJECTIVE:  - denies F/C, lightheadedness, HA, CP, SOB, abd pain, N/V/D/C, burning w/ urination, leg swelling    focused ROS as above    MEDICATIONS  (STANDING):  aspirin enteric coated 81 milliGRAM(s) Oral daily  dextrose 5%. 1000 milliLiter(s) (50 mL/Hr) IV Continuous <Continuous>  dextrose 50% Injectable 12.5 Gram(s) IV Push once  dextrose 50% Injectable 25 Gram(s) IV Push once  dextrose 50% Injectable 25 Gram(s) IV Push once  enoxaparin Injectable 40 milliGRAM(s) SubCutaneous daily  furosemide   Injectable 40 milliGRAM(s) IV Push daily  hydrALAZINE 10 milliGRAM(s) Oral every 8 hours  isosorbide   dinitrate Tablet (ISORDIL) 5 milliGRAM(s) Oral three times a day  piperacillin/tazobactam IVPB.. 3.375 Gram(s) IV Intermittent every 8 hours  polyethylene glycol 3350 17 Gram(s) Oral two times a day    MEDICATIONS  (PRN):  dextrose 40% Gel 15 Gram(s) Oral once PRN Blood Glucose LESS THAN 70 milliGRAM(s)/deciLiter  glucagon  Injectable 1 milliGRAM(s) IntraMuscular once PRN Glucose <70 milliGRAM(s)/deciLiter      OBJECTIVE:  T(C): 36.2 (10-25-20 @ 02:00), Max: 36.6 (10-24-20 @ 22:00)  HR: 68 (10-25-20 @ 03:23) (45 - 72)  BP: 153/70 (10-25-20 @ 02:00) (141/65 - 154/78)  RR: 16 (10-25-20 @ 02:00) (16 - 20)  SpO2: 98% (10-25-20 @ 03:23) (95% - 100%)    PHYSICAL EXAM  GENERAL: NAD   HEAD:  Atraumatic, normocephalic  EYES: PERRLA, sclera clear  CHEST/LUNG: Clear to auscultation bilaterally; no wheeze  HEART: RRR; S1, S2; no M/R/G  ABDOMEN: soft, non-distended; non-tender; BS present  EXTREMITIES:  2+ Peripheral Pulses; no edema  NEURO: non-focal  PSYCH: appropriate affect  SKIN: No rashes or lesions    LABS:  CAPILLARY BLOOD GLUCOSE      POCT Blood Glucose.: 80 mg/dL (24 Oct 2020 22:08)  POCT Blood Glucose.: 108 mg/dL (24 Oct 2020 19:25)  POCT Blood Glucose.: 106 mg/dL (24 Oct 2020 19:03)  POCT Blood Glucose.: 91 mg/dL (24 Oct 2020 18:35)  POCT Blood Glucose.: 108 mg/dL (24 Oct 2020 18:10)  POCT Blood Glucose.: 63 mg/dL (24 Oct 2020 17:36)  POCT Blood Glucose.: 125 mg/dL (24 Oct 2020 07:58)  POCT Blood Glucose.: 78 mg/dL (24 Oct 2020 07:37)    I&O's Summary    23 Oct 2020 07:01  -  24 Oct 2020 07:00  --------------------------------------------------------  IN: 0 mL / OUT: 1200 mL / NET: -1200 mL    24 Oct 2020 07:01  -  25 Oct 2020 06:12  --------------------------------------------------------  IN: 0 mL / OUT: 1650 mL / NET: -1650 mL                            12.3   9.72  )-----------( 198      ( 24 Oct 2020 04:30 )             39.4     10-24    124<L>  |  86<L>  |  21  ----------------------------<  67<L>  4.8   |  29  |  0.74    Ca    8.6      24 Oct 2020 13:20  Phos  4.8     10-24  Mg     1.9     10-24    TPro  6.6  /  Alb  3.5  /  TBili  0.6  /  DBili  x   /  AST  21  /  ALT  18  /  AlkPhos  105  10-23    PT/INR - ( 23 Oct 2020 13:30 )   PT: 13.0 SEC;   INR: 1.15          PTT - ( 23 Oct 2020 13:30 )  PTT:30.0 SEC      Urinalysis Basic - ( 23 Oct 2020 14:27 )    Color: LIGHT YELLOW / Appearance: CLEAR / S.009 / pH: 6.5  Gluc: 30 / Ketone: NEGATIVE  / Bili: NEGATIVE / Urobili: NORMAL   Blood: MODERATE / Protein: 50 / Nitrite: NEGATIVE   Leuk Esterase: LARGE / RBC: 6-10 / WBC 26-50   Sq Epi: OCC / Non Sq Epi: x / Bacteria: MANY        Microbiology:    Culture - Blood (collected 23 Oct 2020 23:15)  Source: .Blood Blood-Venous  Gram Stain (24 Oct 2020 20:57):    Growth in aerobic and anaerobic bottles: Gram Positive Cocci in Clusters  Preliminary Report (24 Oct 2020 20:57):    Growth in aerobic and anaerobic bottles: Gram Positive Cocci in Clusters    Culture - Blood (collected 23 Oct 2020 23:15)  Source: .Blood Blood-Peripheral  Gram Stain (24 Oct 2020 19:59):    Growth in anaerobic bottle: Gram Positive Cocci in Clusters    Growth in aerobic bottle: Gram Positive Cocci in Clusters  Preliminary Report (24 Oct 2020 19:59):    Growth in anaerobic bottle: Gram Positive Cocci in Clusters    "Due to technical problems, Proteus sp. will Not be reported as part of    the BCID panel until further notice"    ***Blood Panel PCR results on this specimen are available    approximately 3 hours after the Gram stain result.***    Gram stain, PCR, and/or culture results may not always    correspond due to difference in methodologies.    ************************************************************    This PCR assay was performed using [employer].    The following targets are tested for: Enterococcus,    vancomycin resistant enterococci, Listeria monocytogenes,    coagulase negative staphylococci, S. aureus,    methicillin resistant S. aureus, Streptococcus agalactiae    (Group B), S. pneumoniae, S. pyogenes (Group A),    Acinetobacter baumannii, Enterobacter cloacae, E. coli,    Klebsiella oxytoca, K. pneumoniae, Proteus sp.,    Serratia marcescens, Haemophilus influenzae,    Neisseria meningitidis, Pseudomonas aeruginosa, Candida    albicans, C. glabrata, C krusei, C parapsilosis,    C. tropicalis and the KPC resistance gene.    Growth in aerobic bottle: Gram Positive Cocci in Clusters  Organism: Blood Culture PCR (24 Oct 2020 20:28)  Organism: Blood Culture PCR (24 Oct 2020 20:28)    Culture - Urine (collected 23 Oct 2020 20:47)  Source: .Urine Clean Catch (Midstream)  Preliminary Report (24 Oct 2020 20:35):    >100,000 CFU/ml Pseudomonas aeruginosa        RADIOLOGY & ADDITIONAL TESTS:    Imaging Personally Reviewed:  Consultant(s) Notes Reviewed:    Care Discussed with Consultants/Other Providers: Camilo Uriostegui MD  Internal Medicine, PGY2  [facility]: [phone] / [facility]: [id number]    Patient is a 89y old  Female who presents with a chief complaint of hypoxia (24 Oct 2020 11:03)    OVERNIGHT EVENTS: NAEON    SUBJECTIVE:  - denies F/C, lightheadedness, HA, CP, SOB, abd pain, N/V/D/C, burning w/ urination, leg swelling    focused ROS as above    MEDICATIONS  (STANDING):  aspirin enteric coated 81 milliGRAM(s) Oral daily  dextrose 5%. 1000 milliLiter(s) (50 mL/Hr) IV Continuous <Continuous>  dextrose 50% Injectable 12.5 Gram(s) IV Push once  dextrose 50% Injectable 25 Gram(s) IV Push once  dextrose 50% Injectable 25 Gram(s) IV Push once  enoxaparin Injectable 40 milliGRAM(s) SubCutaneous daily  furosemide   Injectable 40 milliGRAM(s) IV Push daily  hydrALAZINE 10 milliGRAM(s) Oral every 8 hours  isosorbide   dinitrate Tablet (ISORDIL) 5 milliGRAM(s) Oral three times a day  piperacillin/tazobactam IVPB.. 3.375 Gram(s) IV Intermittent every 8 hours  polyethylene glycol 3350 17 Gram(s) Oral two times a day    MEDICATIONS  (PRN):  dextrose 40% Gel 15 Gram(s) Oral once PRN Blood Glucose LESS THAN 70 milliGRAM(s)/deciLiter  glucagon  Injectable 1 milliGRAM(s) IntraMuscular once PRN Glucose <70 milliGRAM(s)/deciLiter      OBJECTIVE:  T(C): 36.2 (10-25-20 @ 02:00), Max: 36.6 (10-24-20 @ 22:00)  HR: 68 (10-25-20 @ 03:23) (45 - 72)  BP: 153/70 (10-25-20 @ 02:00) (141/65 - 154/78)  RR: 16 (10-25-20 @ 02:00) (16 - 20)  SpO2: 98% (10-25-20 @ 03:23) (95% - 100%)    PHYSICAL EXAM  GENERAL: obese, ill appearing, on AVAPS, NAD   HEAD:  Atraumatic, normocephalic  EYES: EMOI, sclera clear  CHEST/LUNG: difficult to appreciate due to habitus but scattered rales upper lung fields b/l; no wheeze  HEART: RRR; S1, S2; no M/R/G  ABDOMEN: soft, non-distended; non-tender; BS present  EXTREMITIES:  2+ pitting edema b/l, mild tenderness; peripheral pulses intact;   NEURO: non-focal  PSYCH: not assessed  SKIN: No rashes or lesions    LABS:  CAPILLARY BLOOD GLUCOSE      POCT Blood Glucose.: 80 mg/dL (24 Oct 2020 22:08)  POCT Blood Glucose.: 108 mg/dL (24 Oct 2020 19:25)  POCT Blood Glucose.: 106 mg/dL (24 Oct 2020 19:03)  POCT Blood Glucose.: 91 mg/dL (24 Oct 2020 18:35)  POCT Blood Glucose.: 108 mg/dL (24 Oct 2020 18:10)  POCT Blood Glucose.: 63 mg/dL (24 Oct 2020 17:36)  POCT Blood Glucose.: 125 mg/dL (24 Oct 2020 07:58)  POCT Blood Glucose.: 78 mg/dL (24 Oct 2020 07:37)    I&O's Summary    23 Oct 2020 07:01  -  24 Oct 2020 07:00  --------------------------------------------------------  IN: 0 mL / OUT: 1200 mL / NET: -1200 mL    24 Oct 2020 07:01  -  25 Oct 2020 06:12  --------------------------------------------------------  IN: 0 mL / OUT: 1650 mL / NET: -1650 mL                         13.1   7.72  )-----------( 192      ( 25 Oct 2020 07:00 )             41.0       10-25    124<L>  |  85<L>  |  21  ----------------------------<  66<L>  4.4   |  24  |  0.73    Ca    8.8      25 Oct 2020 07:23  Phos  4.8     10-24  Mg     1.9     10-24    TPro  6.4  /  Alb  3.0<L>  /  TBili  0.6  /  DBili  x   /  AST  23  /  ALT  17  /  AlkPhos  95  10-25          ABG - ( 24 Oct 2020 13:20 )  pH, Arterial: 7.27  pH, Blood: x     /  pCO2: 74    /  pO2: 165   / HCO3: 28    / Base Excess: 6.3   /  SaO2: 99.5      Lactate Trend  10-24 @ 13:20 Lactate:0.7   10-24 @ 09:05 Lactate:1.6   10-23 @ 18:42 Lactate:0.9     CAPILLARY BLOOD GLUCOSE      POCT Blood Glucose.: 76 mg/dL (26 Oct 2020 00:08)    Culture Results:   >100,000 CFU/ml Pseudomonas aeruginosa (10-24 @ 12:21)  Culture Results:   Growth in aerobic and anaerobic bottles: Staphylococcus epidermidis  Susceptibility to follow.  "Due to technical problems, Proteus sp. will Not be reported as part of  the BCID panel until further notice"  ***Blood Panel PCR results on this specimen are available  approximately 3 hours after the Gram stain result.***  Gram stain, PCR, and/or culture results may not always  correspond due to difference in methodologies.  ************************************************************  This PCR assay was performed using [employer].  The following targets are tested for: Enterococcus,  vancomycin resistant enterococci, Listeria monocytogenes,  coagulase negative staphylococci, S. aureus,  methicillin resistant S. aureus, Streptococcus agalactiae  (Group B), S. pneumoniae, S. pyogenes (Group A),  Acinetobacter baumannii, Enterobacter cloacae, E. coli,  Klebsiella oxytoca, K. pneumoniae, Proteus sp.,  Serratia marcescens, Haemophilus influenzae,  Neisseria meningitidis, Pseudomonas aeruginosa, Candida  albicans, C. glabrata, C krusei, C parapsilosis,  C. tropicalis and the KPC resistance gene. (10-23 @ 23:15)  Culture Results:   Growth in aerobic and anaerobic bottles: Staphylococcus epidermidis (10-23 @ 23:15)  Culture Results:   >100,000 CFU/ml Pseudomonas aeruginosa (10-23 @ 16:38)      Urinalysis Basic - ( 23 Oct 2020 14:27 )    Color: LIGHT YELLOW / Appearance: CLEAR / S.009 / pH: 6.5  Gluc: 30 / Ketone: NEGATIVE  / Bili: NEGATIVE / Urobili: NORMAL   Blood: MODERATE / Protein: 50 / Nitrite: NEGATIVE   Leuk Esterase: LARGE / RBC: 6-10 / WBC 26-50   Sq Epi: OCC / Non Sq Epi: x / Bacteria: MANY    Microbiology:    Culture - Blood (collected 23 Oct 2020 23:15)  Source: .Blood Blood-Venous  Gram Stain (24 Oct 2020 20:57):    Growth in aerobic and anaerobic bottles: Gram Positive Cocci in Clusters  Preliminary Report (24 Oct 2020 20:57):    Growth in aerobic and anaerobic bottles: Gram Positive Cocci in Clusters    Culture - Blood (collected 23 Oct 2020 23:15)  Source: .Blood Blood-Peripheral  Gram Stain (24 Oct 2020 19:59):    Growth in anaerobic bottle: Gram Positive Cocci in Clusters    Growth in aerobic bottle: Gram Positive Cocci in Clusters  Preliminary Report (24 Oct 2020 19:59):    Growth in anaerobic bottle: Gram Positive Cocci in Clusters    "Due to technical problems, Proteus sp. will Not be reported as part of    the BCID panel until further notice"    ***Blood Panel PCR results on this specimen are available    approximately 3 hours after the Gram stain result.***    Gram stain, PCR, and/or culture results may not always    correspond due to difference in methodologies.    ************************************************************    This PCR assay was performed using [employer].    The following targets are tested for: Enterococcus,    vancomycin resistant enterococci, Listeria monocytogenes,    coagulase negative staphylococci, S. aureus,    methicillin resistant S. aureus, Streptococcus agalactiae    (Group B), S. pneumoniae, S. pyogenes (Group A),    Acinetobacter baumannii, Enterobacter cloacae, E. coli,    Klebsiella oxytoca, K. pneumoniae, Proteus sp.,    Serratia marcescens, Haemophilus influenzae,    Neisseria meningitidis, Pseudomonas aeruginosa, Candida    albicans, C. glabrata, C krusei, C parapsilosis,    C. tropicalis and the KPC resistance gene.    Growth in aerobic bottle: Gram Positive Cocci in Clusters  Organism: Blood Culture PCR (24 Oct 2020 20:28)  Organism: Blood Culture PCR (24 Oct 2020 20:28)    Culture - Urine (collected 23 Oct 2020 20:47)  Source: .Urine Clean Catch (Midstream)  Preliminary Report (24 Oct 2020 20:35):    >100,000 CFU/ml Pseudomonas aeruginosa        RADIOLOGY & ADDITIONAL TESTS:    Imaging Personally Reviewed:  Consultant(s) Notes Reviewed:    Care Discussed with Consultants/Other Providers:

## 2020-10-25 NOTE — PROGRESS NOTE ADULT - PROBLEM SELECTOR PLAN 10
Unable to reach family for collateral, Older son OLDEST SON ALEJO -[phone]    PCP Allison BYRNE form in CHART, DNR/DNI

## 2020-10-25 NOTE — PHYSICAL THERAPY INITIAL EVALUATION ADULT - ADDITIONAL COMMENTS
As per EMR (January 2020 Care coordination assessment): Pt is wheelchair bound after a fall 4 years ago. Pt lives with son and his family and has a home health aide for 11 hours per day, 7 days per week. Pt is bed bound and incontinent of  urine and feces. Requires total assistance with all ADL's.  Pt can  transfer from  bed  to wheelchair with one person  assist.  -- Unclear if this remains pt's functional status. Please see updated care coordination assessment for this admission when available.     Pt left semi-plummer in bed, NAD. +call bell. +BiPAP. RN aware of session.

## 2020-10-25 NOTE — PROGRESS NOTE ADULT - ASSESSMENT
89 yo F with PMHx depression w/ suicide attempt, rhabdomyolysis, CVA, w/ dysarthria, falls now wheel chair bound, admitted in Jan 2020 for AMS who presents for hypoxemic, hypercarbic respiratory failure 2/2 ADHF and aspiration PNA.

## 2020-10-25 NOTE — PROGRESS NOTE ADULT - PROBLEM SELECTOR PLAN 1
acute hypoxemic hypercapnic respiratory failure in the setting of volume overload and possible aspiration event.  - C/w AVAPS 14/8, 400, 25, 40%  - ABG still with some respiratory acidosis  - Pulm consulted for AVAPS management acute hypoxemic hypercapnic respiratory failure in the setting of volume overload and possible aspiration event.  - C/w AVAPS 14/8, 400, 25, 40%  - VBG w/ hypercarbia still  - Pulm consulted for AVAPS management, wean as tolerated

## 2020-10-25 NOTE — PROGRESS NOTE ADULT - ATTENDING COMMENTS
Patient seen and examined at bedside. Plan discussed with residents   89 yo F h/o depression w/ suicide attempt, CVA, w/ dysarthria,wheel chair bound presenting with shortness of breath. Found to have hypoxic hypercarbic respiratory failure 2/2 ADHF and aspiration PNA.   Resp failure with hypoxia and hypercapnia- MS slightly improved, O2 sat wnl but pCO2 not improved. Will continue AVAPS 14/8, 400, 25, 40%, Repeat VBG in evening.   PNA- Concern for aspiration. c/w zosyn  ADHF- Continue IV Lasix. Monitor I/O, daily weight  Hypoglycemia- Pt NPO due to MS and AVAPS. Unable to start D5 due to her acute CHF. Will monitor FSG and give D50 as needed

## 2020-10-25 NOTE — PHYSICAL THERAPY INITIAL EVALUATION ADULT - DISCHARGE DISPOSITION, PT EVAL
To be determined pending functional evaluation. Please monitor PT notes closely. Pt not following commands. However, pt currently on BiPAP. Unclear if machine is interfering with ability to follow commands. PT will attempt again to see if pt is appropriate for PT program.

## 2020-10-25 NOTE — PROGRESS NOTE ADULT - PROBLEM SELECTOR PLAN 4
- presumably hypervolemic hyponatremia in setting of volume overload, likely chronic would correct <6-8meq per 24h  - Given hypervolemic state and elevated urine Na and FENa - concern for development of renal failure vs SIADH  - Fluid restrict 1.5L +/- salt tabs. Correct 6-8meq/day  - Diuresis as above

## 2020-10-25 NOTE — PHYSICAL THERAPY INITIAL EVALUATION ADULT - GENERAL OBSERVATIONS, REHAB EVAL
Pt received semi-plummer in bed, +BiPAP, NAD. Pt agreeable to PT consultation. Cleared for PT as per SULY Mcgowan

## 2020-10-25 NOTE — PHYSICAL THERAPY INITIAL EVALUATION ADULT - PREDICTED DURATION OF THERAPY (DAYS/WKS), PT EVAL
to be determined pending further functional assessment; pt with limited ability to follow commands during PT initial evaluation. PT will follow up to see if pt is appropriate for skilled PT services

## 2020-10-25 NOTE — PROGRESS NOTE ADULT - PROBLEM SELECTOR PLAN 2
pt in acute decompensated heart failure, appears hypervolemic on exam. Unclear etiology. ACS workup negative    - CXR w/ pulmonary edema  - Elevated BNP  - f/u TTE  - Strict IOs   - Daily weights  - c/w lasix 40 IVP qd (lasix naive) pt in acute decompensated heart failure, appears hypervolemic on exam. Unclear etiology. ACS workup negative  - CXR w/ pulmonary edema  - Elevated BNP  - f/u TTE  - Strict IOs   - Daily weights  - c/w lasix 40 IVP qd (lasix naive), goal net -1L/d  - Cards consulted, recs reviewed.

## 2020-10-25 NOTE — CONSULT NOTE ADULT - ASSESSMENT
89 yo F with PMHx depression w/ suicide attempt, rhabdomyolysis, CVA, w/ dysarthria, falls now wheel chair bound, admitted in Jan 2020 for AMS who presents for hypoxemic, hypercarbic respiratory failure 2/2 ADHF and aspiration PNA.       e  Problem/Plan - 1:  ·  Problem: Acute respiratory failure with hypoxia and hypercapnia.  Plan: acute hypoxemic hypercapnic respiratory failure in the setting of volume overload and possible aspiration event.  - C/w AVAPS 14/8, 400, 25, 40%  - ABG still with some respiratory acidosis  - Pulm consulted for AVAPS management.     Problem/Plan - 2:  ·  Problem: Acute heart failure, unspecified heart failure type.  Plan: pt in acute decompensated heart failure, appears hypervolemic on exam. Unclear etiology. ACS workup negative    - CXR w/ pulmonary edema  - Elevated BNP  - f/u TTE  - Strict IOs   - Daily weights  - c/w lasix 40 IVP qd (lasix naive).     Problem/Plan - 3:  ·  Problem: Aspiration pneumonia due to regurgitated food, unspecified laterality, unspecified part of lung.  Plan: pt w/ hx dysarthria, CAP, now w/ focal consolidations in RML RLL    - f/u sputum cx  - f/u urine legionella  - c/w zosyn (10/23 - )  - d/c azithro (10/23-10/24).     Problem/Plan - 4:  ·  Problem: Hyponatremia.  Plan: - presumably hypervolemic hyponatremia in setting of volume overload, likely chronic would correct <6-8meq per 24h  - Given hypervolemic state and elevated urine Na and FENa - concern for development of renal failure vs SIADH  - Fluid restrict 1.5L +/- salt tabs. Correct 6-8meq/day  - Diuresis as above.     Problem/Plan - 5:  ·  Problem: Hyperkalemia.  Plan: - HyperK of unclear etiology, perhaps MOISES  - s/p Calcium gluconate, insulin in ED  - reassess BMP.     Problem/Plan - 6:  Problem: Metabolic encephalopathy. Plan: - Metabolic encephalopathy like 2/2 hypercarbia, HF, hypoNa, PNA, hypoxia.   - AOx3 baseline per outpt notes.  - CTM.    Problem/Plan - 7:  ·  Problem: Cerebrovascular accident (CVA), unspecified mechanism.  Plan: - hx CVA   - cw ASA.     Problem/Plan - 8:  ·  Problem: Urinary incontinence, unspecified type.  Plan: - pt w/ grossly positive UA  - f/u UCX  - Maintain narayan for UOP monitoring.   - Bowel regimen, stool count given hx of constipation  - Zosyn as above.     Problem/Plan - 9:  ·  Problem: Prophylactic measure.  Plan: DVT: LVX    Diet: NPO pending S+S eval     Fall, aspiration precautions HOB 45.     Problem/Plan - 10:  Problem: Discharge planning issues. Plan; Unable to reach family for collateral, Older son OLDEST SON ALEJO -[phone]    PCP Allison BYRNE form in CHART, DNR/DNI. 89 yo F with PMHx depression w/ suicide attempt, rhabdomyolysis, CVA, w/ dysarthria, falls now wheel chair bound, admitted in Jan 2020 for AMS who presents for hypoxemic, hypercarbic respiratory failure 2/2 ADHF and aspiration PNA.     IMPRESSION:  Hypervolemic hyponatremia  PLAN:  Volume status:  -volume overloaded on exam  -would continue the lasix at 40mg IV daily, patient is net negative 2 liters today and 5.7 liters overall   -continue to keep net negative 1 liter to 2 liters daily, patient has a chronic narayan, please document intake and out put every 2-4 hours  -volume distribution in her body is interesting, ankles are very skinny, lower extremities with tight edema, upper extremities are very swollen and there is some JVD.   -can increase afterload reduction to hydral 25 tid and continue isordil 10  -ECHO is done, pending review  -hyponatremia is improving gradually, do not want to correct too quickly.  -metabolic derangement is improved  -consider nephrology consultation for hyponatremia   -consider ID consult +blood cultures, no leukocytosis, no fever today  -continue to keep oxygen saturation greater than 90%, titrate off the BIPAP if tolerated  -continue antibiotics as per primary team, UA + and culture showing pseudomonas auriginosa  -Blood cultures with gram +cocci in clusters   -This plan has been discussed with Dr. Erwin

## 2020-10-25 NOTE — PHYSICAL THERAPY INITIAL EVALUATION ADULT - PERTINENT HX OF CURRENT PROBLEM, REHAB EVAL
89 y.o. Female with PMHx depression with suicide attempt, rhabdomyolysis, CVA, with dysarthria, falls now wheelchair bound, admitted in January 2020 for AMS who presents for hypoxemic, hypercarbic respiratory failure secondary to ADHF and aspiration PNA.

## 2020-10-25 NOTE — PROVIDER CONTACT NOTE (CRITICAL VALUE NOTIFICATION) - ACTION/TREATMENT ORDERED:
MD Devin Rivera from Team 7 made aware. Will continue to monitor.
No further instruction ordered at this time

## 2020-10-26 DIAGNOSIS — G93.49 OTHER ENCEPHALOPATHY: ICD-10-CM

## 2020-10-26 DIAGNOSIS — R78.81 BACTEREMIA: ICD-10-CM

## 2020-10-26 DIAGNOSIS — Z71.89 OTHER SPECIFIED COUNSELING: ICD-10-CM

## 2020-10-26 DIAGNOSIS — Z51.5 ENCOUNTER FOR PALLIATIVE CARE: ICD-10-CM

## 2020-10-26 DIAGNOSIS — R53.81 OTHER MALAISE: ICD-10-CM

## 2020-10-26 LAB
-  AMIKACIN: SIGNIFICANT CHANGE UP
-  AMPICILLIN/SULBACTAM: SIGNIFICANT CHANGE UP
-  AZTREONAM: SIGNIFICANT CHANGE UP
-  CEFAZOLIN: SIGNIFICANT CHANGE UP
-  CEFEPIME: SIGNIFICANT CHANGE UP
-  CEFTAZIDIME: SIGNIFICANT CHANGE UP
-  CIPROFLOXACIN: SIGNIFICANT CHANGE UP
-  CLINDAMYCIN: SIGNIFICANT CHANGE UP
-  ERYTHROMYCIN: SIGNIFICANT CHANGE UP
-  GENTAMICIN: SIGNIFICANT CHANGE UP
-  GENTAMICIN: SIGNIFICANT CHANGE UP
-  IMIPENEM: SIGNIFICANT CHANGE UP
-  LEVOFLOXACIN: SIGNIFICANT CHANGE UP
-  MEROPENEM: SIGNIFICANT CHANGE UP
-  OXACILLIN: SIGNIFICANT CHANGE UP
-  PENICILLIN: SIGNIFICANT CHANGE UP
-  PIPERACILLIN/TAZOBACTAM: SIGNIFICANT CHANGE UP
-  RIFAMPIN: SIGNIFICANT CHANGE UP
-  TETRACYCLINE: SIGNIFICANT CHANGE UP
-  TOBRAMYCIN: SIGNIFICANT CHANGE UP
-  TRIMETHOPRIM/SULFAMETHOXAZOLE: SIGNIFICANT CHANGE UP
-  VANCOMYCIN: SIGNIFICANT CHANGE UP
ALBUMIN SERPL ELPH-MCNC: 3.1 G/DL — LOW (ref 3.3–5)
ALP SERPL-CCNC: 88 U/L — SIGNIFICANT CHANGE UP (ref 40–120)
ALT FLD-CCNC: 15 U/L — SIGNIFICANT CHANGE UP (ref 4–33)
ANION GAP SERPL CALC-SCNC: 12 MMO/L — SIGNIFICANT CHANGE UP (ref 7–14)
AST SERPL-CCNC: 21 U/L — SIGNIFICANT CHANGE UP (ref 4–32)
BASE EXCESS BLDA CALC-SCNC: 12.6 MMOL/L — SIGNIFICANT CHANGE UP
BASOPHILS # BLD AUTO: 0.07 K/UL — SIGNIFICANT CHANGE UP (ref 0–0.2)
BASOPHILS NFR BLD AUTO: 0.9 % — SIGNIFICANT CHANGE UP (ref 0–2)
BILIRUB SERPL-MCNC: 0.8 MG/DL — SIGNIFICANT CHANGE UP (ref 0.2–1.2)
BLOOD GAS ARTERIAL - FIO2: 40 — SIGNIFICANT CHANGE UP
BUN SERPL-MCNC: 15 MG/DL — SIGNIFICANT CHANGE UP (ref 7–23)
CALCIUM SERPL-MCNC: 8.6 MG/DL — SIGNIFICANT CHANGE UP (ref 8.4–10.5)
CHLORIDE BLDA-SCNC: 86 MMOL/L — LOW (ref 96–108)
CHLORIDE SERPL-SCNC: 88 MMOL/L — LOW (ref 98–107)
CO2 SERPL-SCNC: 29 MMOL/L — SIGNIFICANT CHANGE UP (ref 22–31)
CREAT SERPL-MCNC: 0.65 MG/DL — SIGNIFICANT CHANGE UP (ref 0.5–1.3)
CULTURE RESULTS: SIGNIFICANT CHANGE UP
EOSINOPHIL # BLD AUTO: 0.28 K/UL — SIGNIFICANT CHANGE UP (ref 0–0.5)
EOSINOPHIL NFR BLD AUTO: 3.7 % — SIGNIFICANT CHANGE UP (ref 0–6)
GLUCOSE BLDA-MCNC: 86 MG/DL — SIGNIFICANT CHANGE UP (ref 70–99)
GLUCOSE BLDC GLUCOMTR-MCNC: 104 MG/DL — HIGH (ref 70–99)
GLUCOSE BLDC GLUCOMTR-MCNC: 105 MG/DL — HIGH (ref 70–99)
GLUCOSE BLDC GLUCOMTR-MCNC: 121 MG/DL — HIGH (ref 70–99)
GLUCOSE BLDC GLUCOMTR-MCNC: 67 MG/DL — LOW (ref 70–99)
GLUCOSE BLDC GLUCOMTR-MCNC: 68 MG/DL — LOW (ref 70–99)
GLUCOSE BLDC GLUCOMTR-MCNC: 70 MG/DL — SIGNIFICANT CHANGE UP (ref 70–99)
GLUCOSE BLDC GLUCOMTR-MCNC: 70 MG/DL — SIGNIFICANT CHANGE UP (ref 70–99)
GLUCOSE BLDC GLUCOMTR-MCNC: 75 MG/DL — SIGNIFICANT CHANGE UP (ref 70–99)
GLUCOSE BLDC GLUCOMTR-MCNC: 76 MG/DL — SIGNIFICANT CHANGE UP (ref 70–99)
GLUCOSE BLDC GLUCOMTR-MCNC: 82 MG/DL — SIGNIFICANT CHANGE UP (ref 70–99)
GLUCOSE BLDC GLUCOMTR-MCNC: 96 MG/DL — SIGNIFICANT CHANGE UP (ref 70–99)
GLUCOSE SERPL-MCNC: 78 MG/DL — SIGNIFICANT CHANGE UP (ref 70–99)
HCO3 BLDA-SCNC: 35 MMOL/L — HIGH (ref 22–26)
HCT VFR BLD CALC: 42 % — SIGNIFICANT CHANGE UP (ref 34.5–45)
HCT VFR BLDA CALC: 39.4 % — SIGNIFICANT CHANGE UP (ref 34.5–46.5)
HGB BLD-MCNC: 13.1 G/DL — SIGNIFICANT CHANGE UP (ref 11.5–15.5)
HGB BLDA-MCNC: 12.8 G/DL — SIGNIFICANT CHANGE UP (ref 11.5–15.5)
IMM GRANULOCYTES NFR BLD AUTO: 0.4 % — SIGNIFICANT CHANGE UP (ref 0–1.5)
LACTATE BLDA-SCNC: 1 MMOL/L — SIGNIFICANT CHANGE UP (ref 0.5–2)
LYMPHOCYTES # BLD AUTO: 0.89 K/UL — LOW (ref 1–3.3)
LYMPHOCYTES # BLD AUTO: 11.8 % — LOW (ref 13–44)
MAGNESIUM SERPL-MCNC: 1.6 MG/DL — SIGNIFICANT CHANGE UP (ref 1.6–2.6)
MCHC RBC-ENTMCNC: 28.5 PG — SIGNIFICANT CHANGE UP (ref 27–34)
MCHC RBC-ENTMCNC: 31.2 % — LOW (ref 32–36)
MCV RBC AUTO: 91.3 FL — SIGNIFICANT CHANGE UP (ref 80–100)
METHOD TYPE: SIGNIFICANT CHANGE UP
METHOD TYPE: SIGNIFICANT CHANGE UP
MONOCYTES # BLD AUTO: 0.68 K/UL — SIGNIFICANT CHANGE UP (ref 0–0.9)
MONOCYTES NFR BLD AUTO: 9 % — SIGNIFICANT CHANGE UP (ref 2–14)
NEUTROPHILS # BLD AUTO: 5.58 K/UL — SIGNIFICANT CHANGE UP (ref 1.8–7.4)
NEUTROPHILS NFR BLD AUTO: 74.2 % — SIGNIFICANT CHANGE UP (ref 43–77)
NRBC # FLD: 0 K/UL — SIGNIFICANT CHANGE UP (ref 0–0)
ORGANISM # SPEC MICROSCOPIC CNT: SIGNIFICANT CHANGE UP
PCO2 BLDA: 63 MMHG — HIGH (ref 32–48)
PH BLDA: 7.4 PH — SIGNIFICANT CHANGE UP (ref 7.35–7.45)
PHOSPHATE SERPL-MCNC: 3.5 MG/DL — SIGNIFICANT CHANGE UP (ref 2.5–4.5)
PLATELET # BLD AUTO: 175 K/UL — SIGNIFICANT CHANGE UP (ref 150–400)
PMV BLD: 9.5 FL — SIGNIFICANT CHANGE UP (ref 7–13)
PO2 BLDA: 155 MMHG — HIGH (ref 83–108)
POTASSIUM BLDA-SCNC: 3.5 MMOL/L — SIGNIFICANT CHANGE UP (ref 3.4–4.5)
POTASSIUM SERPL-MCNC: 4 MMOL/L — SIGNIFICANT CHANGE UP (ref 3.5–5.3)
POTASSIUM SERPL-SCNC: 4 MMOL/L — SIGNIFICANT CHANGE UP (ref 3.5–5.3)
PROT SERPL-MCNC: 6.4 G/DL — SIGNIFICANT CHANGE UP (ref 6–8.3)
RBC # BLD: 4.6 M/UL — SIGNIFICANT CHANGE UP (ref 3.8–5.2)
RBC # FLD: 14.3 % — SIGNIFICANT CHANGE UP (ref 10.3–14.5)
SAO2 % BLDA: 99.3 % — HIGH (ref 95–99)
SODIUM BLDA-SCNC: 130 MMOL/L — LOW (ref 136–146)
SODIUM SERPL-SCNC: 129 MMOL/L — LOW (ref 135–145)
SPECIMEN SOURCE: SIGNIFICANT CHANGE UP
WBC # BLD: 7.53 K/UL — SIGNIFICANT CHANGE UP (ref 3.8–10.5)
WBC # FLD AUTO: 7.53 K/UL — SIGNIFICANT CHANGE UP (ref 3.8–10.5)

## 2020-10-26 PROCEDURE — 99497 ADVNCD CARE PLAN 30 MIN: CPT | Mod: 25

## 2020-10-26 PROCEDURE — 99358 PROLONG SERVICE W/O CONTACT: CPT

## 2020-10-26 PROCEDURE — 99223 1ST HOSP IP/OBS HIGH 75: CPT

## 2020-10-26 PROCEDURE — 93306 TTE W/DOPPLER COMPLETE: CPT | Mod: 26

## 2020-10-26 PROCEDURE — 99233 SBSQ HOSP IP/OBS HIGH 50: CPT | Mod: GC

## 2020-10-26 RX ORDER — DEXTROSE 50 % IN WATER 50 %
25 SYRINGE (ML) INTRAVENOUS ONCE
Refills: 0 | Status: COMPLETED | OUTPATIENT
Start: 2020-10-26 | End: 2020-10-26

## 2020-10-26 RX ORDER — ISOSORBIDE DINITRATE 5 MG/1
10 TABLET ORAL THREE TIMES A DAY
Refills: 0 | Status: DISCONTINUED | OUTPATIENT
Start: 2020-10-26 | End: 2020-10-31

## 2020-10-26 RX ORDER — HYDRALAZINE HCL 50 MG
5 TABLET ORAL ONCE
Refills: 0 | Status: COMPLETED | OUTPATIENT
Start: 2020-10-26 | End: 2020-10-26

## 2020-10-26 RX ORDER — MAGNESIUM SULFATE 500 MG/ML
2 VIAL (ML) INJECTION ONCE
Refills: 0 | Status: COMPLETED | OUTPATIENT
Start: 2020-10-26 | End: 2020-10-26

## 2020-10-26 RX ORDER — DEXTROSE 50 % IN WATER 50 %
12.5 SYRINGE (ML) INTRAVENOUS ONCE
Refills: 0 | Status: COMPLETED | OUTPATIENT
Start: 2020-10-26 | End: 2020-10-26

## 2020-10-26 RX ORDER — HYDRALAZINE HCL 50 MG
25 TABLET ORAL EVERY 8 HOURS
Refills: 0 | Status: DISCONTINUED | OUTPATIENT
Start: 2020-10-26 | End: 2020-10-27

## 2020-10-26 RX ADMIN — PIPERACILLIN AND TAZOBACTAM 25 GRAM(S): 4; .5 INJECTION, POWDER, LYOPHILIZED, FOR SOLUTION INTRAVENOUS at 13:23

## 2020-10-26 RX ADMIN — ENOXAPARIN SODIUM 40 MILLIGRAM(S): 100 INJECTION SUBCUTANEOUS at 13:23

## 2020-10-26 RX ADMIN — PIPERACILLIN AND TAZOBACTAM 25 GRAM(S): 4; .5 INJECTION, POWDER, LYOPHILIZED, FOR SOLUTION INTRAVENOUS at 22:36

## 2020-10-26 RX ADMIN — Medication 25 GRAM(S): at 12:59

## 2020-10-26 RX ADMIN — PIPERACILLIN AND TAZOBACTAM 25 GRAM(S): 4; .5 INJECTION, POWDER, LYOPHILIZED, FOR SOLUTION INTRAVENOUS at 06:12

## 2020-10-26 RX ADMIN — Medication 25 MILLILITER(S): at 01:40

## 2020-10-26 RX ADMIN — Medication 5 MILLIGRAM(S): at 22:36

## 2020-10-26 RX ADMIN — Medication 50 GRAM(S): at 10:53

## 2020-10-26 RX ADMIN — Medication 12.5 GRAM(S): at 12:30

## 2020-10-26 RX ADMIN — Medication 40 MILLIGRAM(S): at 06:12

## 2020-10-26 NOTE — CONSULT NOTE ADULT - PROBLEM SELECTOR RECOMMENDATION 6
Thank you for allowing us to participate in your patient's care. We will continue to follow with you. Please page [phone] or contact us via Microsoft Teams for any q's or c's.     Gagan Lara  Palliative Medicine

## 2020-10-26 NOTE — CONSULT NOTE ADULT - PROBLEM SELECTOR RECOMMENDATION 9
.  Likely multifactorial: Hypoxemia, Hypercarbia, on top of vascular dementia    > Patient is hard of hearing. With AVAPS on, it is diffcult for her to hear and communication  > Continue updating, son and HCP, Jarred with regards to d/c plan

## 2020-10-26 NOTE — PROVIDER CONTACT NOTE (OTHER) - ASSESSMENT
Aox2, . Patient is asymptomatic. Due for Hydralazine 10mg PO and Isodil PO, but the patient is unable to tolerate PO meds. Aox2, . Patient is asymptomatic. Due for Hydralazine 10mg PO and Isordil PO, but the patient is unable to tolerate PO meds.

## 2020-10-26 NOTE — PROGRESS NOTE ADULT - ATTENDING COMMENTS
Patient seen and examined by me. Case discussed with resident and agree with the resident's findings and plan as documented in the resident's note. 89F h/o depression w/ suicide attempt, rhabdomyolysis, CVA, w/ dysarthria, falls now wheel chair bound, admitted in Jan 2020 for AMS who presents for hypoxemic, hypercarbic respiratory failure 2/2 ADHF and aspiration PNA c/b bacteremia s. epidermidis possible contaminant?    1. Respiratory:  -PNA: zosyn #4/7  -CHF: lasix 40IV daily; net negative; f/u repeat echo; f/u cards recs  -possible obesity hypoventilation -- f/u with pulm re-AVAPS settings    2. H/o CVA:  -given improved mental status  -will check speech and swallow    3. Pseudo in urine / Gram + cocci in blood:  -on zosyn already  -will check repeat BCx     4. Hypervolemic hyponatremia:  -improving  -continue diuresing    5. metabolic encephalopathy  -improving on avaps    6. Urinary incontinence:  -? when was narayan placed    7. GOC:  -c/w palliative care for GOC Patient seen and examined by me. Case discussed with resident and agree with the resident's findings and plan as documented in the resident's note. 89F h/o depression w/ suicide attempt, rhabdomyolysis, CVA, w/ dysarthria, falls now wheel chair bound, admitted in Jan 2020 for AMS who presents for hypoxemic, hypercarbic respiratory failure 2/2 ADHF and aspiration PNA c/b bacteremia 2/2 Staph epidermidis (possible contaminant)    1. Hypoxemic, hypercarbic respiratory failure 2/2 ADHF and superimposed aspiration PNA +/- obesity hypoventilation:  -PNA: c/w zosyn #4/7; f/u urine legionella and sputum cultures  -acute CHF: c/w lasix 40IV daily; monitor I/Os; patient net negative; f/u repeat echo; f/u cards recs  -case d/w with pulm re-AVAPS settings -- pulm saying it's "ok to try to take patient OFF avaps and monitor her respiratory status closely; be sure to wait >1hr after eating to put it back on and to also have it on overnights".    2. H/o CVA:  -given improved mental status  -will check speech and swallow    3. Pseudo in urine / Gram + cocci in blood:  -on zosyn already  -will check repeat BCx     4. Hyponatremia:  -presumably hypervolemic hyponatremia in setting of volume overload, likely chronic would correct <6-8meq per 24h  -improving  -continue diuresing    5. metabolic encephalopathy  -improving on avaps  - Metabolic encephalopathy like 2/2 hypercarbia, HF, hypoNa, PNA, hypoxia.     6. Urinary incontinence:  -? when was narayan placed    7. GOC:  -c/w palliative care for GOC Patient seen and examined by me. Case discussed with resident and agree with the resident's findings and plan as documented in the resident's note. 89F h/o depression w/ suicide attempt, rhabdomyolysis, CVA, w/ dysarthria, falls now wheel chair bound, admitted in Jan 2020 for AMS who presents for hypoxemic, hypercarbic respiratory failure 2/2 ADHF and aspiration PNA c/b metabolic encephalopathy and bacteremia 2/2 Staph epidermidis (possible contaminant)    1. Hypoxemic, hypercarbic respiratory failure 2/2 ADHF and superimposed aspiration PNA +/- obesity hypoventilation:  -PNA: c/w zosyn #4/7; f/u urine legionella and sputum cultures  -acute CHF: c/w lasix 40IV daily; monitor I/Os; patient net negative; f/u repeat echo at bedside; f/u cards recs  -case d/w with pulm re-AVAPS settings -- pulm saying it's "ok to try to take patient OFF avaps and monitor her respiratory status closely; be sure to wait >1hr after eating to put it back on and to also have it on overnights".  -f/u ABG from this morning    2. H/o CVA:  -given improved mental status  -will check speech and swallow    3. Pseudo in urine:  -f/u Urine Cx sensitivities  -on zosyn already    4. Bacteremia 2/2 Staph. Epi:  -repeat BCx sent today    4. Hyponatremia:  -presumably hypervolemic hyponatremia in setting of volume overload, likely chronic would correct <6-8meq per 24h  -improving  -continue diuresing    5. Metabolic encephalopathy:  - Metabolic encephalopathy like 2/2 hypercarbia, HF, hypoNa, PNA, hypoxia.   -improving on AVAPS    6. Urinary incontinence:  -maintain narayan and monitor UOP for now    7. GOC:  -f/u palliative care for GOC

## 2020-10-26 NOTE — PROVIDER CONTACT NOTE (OTHER) - ACTION/TREATMENT ORDERED:
Provider made aware. Provider instructed to perform the blood sugar again in 30 mins. and notified the provider with the results.

## 2020-10-26 NOTE — PROGRESS NOTE ADULT - PROBLEM SELECTOR PLAN 8
- pt w/ grossly positive UA  - f/u UCX  - Maintain narayan for UOP monitoring.   - Bowel regimen, stool count given hx of constipation  - Zosyn as above - ucx with pseudomonas sensitive to zosyn  - Maintain narayan for UOP monitoring.   - Bowel regimen, stool count given hx of constipation

## 2020-10-26 NOTE — CONSULT NOTE ADULT - ASSESSMENT
89 yo F with PMHx depression w/ suicide attempt, rhabdomyolysis, CVA, w/ dysarthria, falls now wheel chair bound, admitted in Jan 2020 for AMS, CAP,  UTI, currently admitted for respiratory failure and AMS. Palliative Medicine was consulted for complex medical decision making related to goals of care discussions

## 2020-10-26 NOTE — PROGRESS NOTE ADULT - PROBLEM SELECTOR PLAN 1
acute hypoxemic hypercapnic respiratory failure in the setting of volume overload and possible aspiration event.  - C/w AVAPS 14/8, 400, 25, 40%  - VBG w/ hypercarbia still  - Pulm consulted for AVAPS management, wean as tolerated acute hypoxemic hypercapnic respiratory failure in the setting of volume overload and possible aspiration event.    -ABG this afternoon with normal pH, high CO2 and elevated bicarb (renal compensation). Will trial patient off AVAPS during the day  -watch closely for signs of AMS (long tonight)  -bedside swallow eval pending diet  - C/w AVAPS at night  - Appreciate pulm recs re AVAPS weaning

## 2020-10-26 NOTE — PROVIDER CONTACT NOTE (OTHER) - BACKGROUND
Patient was admitted with heart failure and respiratory failure, Patient is NPO except medication,HX:  Urinary retention, HTN, CHF.

## 2020-10-26 NOTE — PROGRESS NOTE ADULT - PROBLEM SELECTOR PLAN 6
- Metabolic encephalopathy like 2/2 hypercarbia, HF, hypoNa, PNA, hypoxia.   - AOx3 baseline per outpt notes.  - CTM - Metabolic encephalopathy like 2/2 hypercarbia, HF, hypoNa, PNA, hypoxia.   - AOx3 baseline per outpt notes.  - Much more awake/alert than Saturday but hard to communicate with mask on  - CTM

## 2020-10-26 NOTE — CONSULT NOTE ADULT - PROBLEM SELECTOR RECOMMENDATION 3
.  Likely volume overload + aspiration    > Wean off AVAPS as tolerated  > Volume management as per Cards

## 2020-10-26 NOTE — CONSULT NOTE ADULT - SUBJECTIVE AND OBJECTIVE BOX
HPI:  89 yo F with PMHx depression w/ suicide attempt, rhabdomyolysis, CVA, w/ dysarthria, falls now wheel chair bound, admitted in 2020 for AMS, CAP,  UTI, currently admitted for respiratory failure and AMS. Palliative Medicine was consulted for complex medical decision making related to goals of care discussions    =======================================================  10/26/2020    - Chart reviewed. Patient seen and examined.  - I attempted to speak with the patient but she remains on AVAPS and was confused  - I spoke to the patient's son and HCP, Jarred Sanchez @ [phone]. Pls refer to [facility] note. In summary:    1) Re-affirmed DNR, DNI  2) Hospice referral. Jarred says he is willing to pay out of pocket for a NH or inpatient hospice (preference).    =======================================================  ----- SYMPTOM ASSESSMENT:   [ ]Unable to obtain due to poor mentation: information obtained from team/ contact    PAIN ASSESSMENT: DENIED  Site-   Onset-   Character-   Radiation-   Associated symptoms-   Timing and duration-   Exacerbating factors  Severity-     Effect on QOL-   PAIN AD Score: 0    Dyspnea:  Yes [ ] No [ ] - [ ]Mild [ ]Moderate [ ]Severe  Anxiety:    Yes [ ] No [ ] - [ ]Mild [ ]Moderate [ ]Severe  Fatigue:    Yes [ ] No [ ] - [ ]Mild [ ]Moderate [ ]Severe  Nausea:    Yes [ ] No [ ] - [ ]Mild [ ]Moderate [ ]Severe                         Loss of appetite: Yes [ ] No [ ] - [ ]Mild [ ]Moderate [ ]Severe             Constipation:  Yes [ ] No [ ] - [ ]Mild [ ]Moderate [ ]Severe  Grief: Yes [ ] No [ ]     [X ]All other review of systems negative, including: weakness, cough, colds, blurry vision, headaches, dysuria, pruritus, palpitations, muscle cramps, easy bruising, epistaxis, rashes  =======================================================  ----- PERTINENT PMH/ SXH/ FHX  Wheelchair bound    History of rhabdomyolysis    Attempted suicide    Depression    Constipation    Urinary incontinence    Stroke    No pertinent past medical history      S/P cholecystectomy    No significant past surgical history      FAMILY HISTORY:  No pertinent family history in first degree relatives    ----- SOCIAL HISTORY:   [ ] Unable to elicit  Significant other/partner:   (NINO)  Children: YES  Advent/Spirituality:  Substance hx: Yes[ ]  No [ ]   Tobacco hx:  Yes [ ] No [ ]   Alcohol hx: Yes [ ] No [ ]   Home Opioid hx:  [ ] I-Stop Reference No:  Living Situation: [X ]Home  [ ]Long term care  [ ]Rehab [ ]Other  PATIENT LIVES WITH JARRED SANCHEZ (SON)    ----- ADVANCE DIRECTIVES:    DNR  Yes    Yes    MOLST  [ ]  Living Will  [ ]   DECISION MAKER(s):  [X ] Health Care Proxy(s)  [ ] Surrogate(s)  [ ] Guardian           Name(s): Phone Number(s):  JARRED SANCHEZ IS HCP @ [phone]  HCP FORM IN AL[facility]    ----- BASELINE (I)ADL(s) (prior to admission):  [place]: [ ]Total  [ ] Moderate [ ]Dependent  Palliative Performance Status Version 2: 40%    =======================================================  -----MEDICATIONS AND ALLERGIES:  Allergies    No Known Allergies    Intolerances    MEDICATIONS  (STANDING):  aspirin enteric coated 81 milliGRAM(s) Oral daily  dextrose 5%. 1000 milliLiter(s) (50 mL/Hr) IV Continuous <Continuous>  dextrose 50% Injectable 12.5 Gram(s) IV Push once  dextrose 50% Injectable 25 Gram(s) IV Push once  dextrose 50% Injectable 25 Gram(s) IV Push once  enoxaparin Injectable 40 milliGRAM(s) SubCutaneous daily  furosemide   Injectable 40 milliGRAM(s) IV Push daily  hydrALAZINE 25 milliGRAM(s) Oral every 8 hours  isosorbide   dinitrate Tablet (ISORDIL) 10 milliGRAM(s) Oral three times a day  piperacillin/tazobactam IVPB.. 3.375 Gram(s) IV Intermittent every 8 hours  polyethylene glycol 3350 17 Gram(s) Oral two times a day    MEDICATIONS  (PRN):  dextrose 40% Gel 15 Gram(s) Oral once PRN Blood Glucose LESS THAN 70 milliGRAM(s)/deciLiter  glucagon  Injectable 1 milliGRAM(s) IntraMuscular once PRN Glucose <70 milliGRAM(s)/deciLiter      =======================================================  ----- PHYSICAL EXAM:  Vital Signs Last 24 Hrs  T(C): 36.2 (26 Oct 2020 06:12), Max: 36.5 (25 Oct 2020 22:29)  T(F): 97.2 (26 Oct 2020 06:12), Max: 97.7 (25 Oct 2020 22:29)  HR: 62 (26 Oct 2020 11:17) (54 - 62)  BP: 152/76 (26 Oct 2020 06:12) (123/69 - 152/76)  BP(mean): --  RR: 17 (26 Oct 2020 06:12) (16 - 17)  SpO2: 97% (26 Oct 2020 11:17) (97% - 100%) I&O's Summary    25 Oct 2020 07:01  -  26 Oct 2020 07:00  --------------------------------------------------------  IN: 0 mL / OUT: 3350 mL / NET: -3350 mL    26 Oct 2020 07:  -  26 Oct 2020 13:40  --------------------------------------------------------  IN: 0 mL / OUT: 1875 mL / NET: -1875 mL    GEN: Awake, CONFUSED, ON AVAPS  HEAD: Normocephalic and atraumatic,   EYES: Anicteric sclerae, EOM's grossly intact  NECK: No JVD, no thyromegaly  PULM: Comfortable work of breathing, AUSCULTATION DIFFICULT  CV: Pulses 2+ symmetric bilaterally, regular rate and rhythm  ABD: Not distended, soft, non-tender,   EXT: No edema, No deformities  PSYCH: Appropriate mood and affect, no suicidal ideations elicited  NEURO: No facial asymmetry, no tremors, no observed movement disorders  SKIN: No rashes or lesions on exposed skin, No jaundice    =======================================================  ----- LABS:                        13.1   7.53  )-----------( 175      ( 26 Oct 2020 08:25 )             42.0   10-26    129<L>  |  88<L>  |  15  ----------------------------<  78  4.0   |  29  |  0.65    Ca    8.6      26 Oct 2020 08:25  Phos  3.5     10-26  Mg     1.6     10-26    TPro  6.4  /  Alb  3.1<L>  /  TBili  0.8  /  DBili  x   /  AST  21  /  ALT  15  /  AlkPhos  88  10-26        -----RADIOLOGY & ADDITIONAL STUDIES:    PROTEIN CALORIE MALNUTRITION PRESENT: [ ] Yes [ ] No  [ ] PPSV2 < or = to 30% [ ] significant weight loss  [ ] poor nutritional intake [ ] catabolic state [ ] anasarca     Albumin, Serum: 3.1 g/dL (10-26-20 @ 08:25)      REFERRALS:   [ ]Chaplaincy  [ ] Hospice  [ ]Child Life  [ ]Social Work  [ ]Case management [ ]Holistic Therapy   Goals of Care Discussion Document:     ************************************************************************  PALLIATIVE MEDICINE COORDINATION OF CARE DOCUMENTATION  [x] Inpatient Consult  [ ] Other:  ************************************************************************  MEDICATION REVIEW:  --- Pls refer to current medicatons in the body of this note  ISTOP REFERENCE: [id number]  --- PRN usage: NO PRN  ------------------------------------------------------------------------  COORDINATION OF CARE:  --- Palliative Care consulted for: GOC  --- Patient assessed: 10/26  --- Patient previously seen by Palliative Care service: YES  ADVANCE CARE PLANNING  --- Code status: DNR, DNI  --- MOLST reviewed in chart: YES  --- HCP/ Surrogate: JARRED DANIEL IS HCP  --- GOC document found in [facility]: NO  --- HCP/ Living will/ Other advanced directives in Alpha: YES  ------------------------------------------------------------------------  CARE PROVIDER DOCUMENTATION:  --- SW/CM notes: Discussed with CM about plan for GOC discussions  --- PT recs: NA  --- Sp/Sw recs: PENDING, AS PATIENT ON AVAPS   --- CARDS: Continue IV diuresis  PLAN OF CARE  --- Known admissions in past year: 2  --- Current admit date: 10/24  --- LOS: 2  --- LACE score: 11  --- Current dispo plan: TO BE DETERMINED  ------------------------------------------------------------------------  --- Chart reviewed: 30 Minutes [including time used to gather, review and transfer data to this note]  --- Start: 1:37  --- End: 2:07  Prolonged services rendered, as part of this patient's care provided by Palliative Medicine, include: i.chart review for provider and ancillary service documentation, ii.pertinent diagnostics including laboratory and imaging studies,iii. medication review including PRN use, iv. admission history including previous palliative care encounters and GOC notes, v.advance care planning documents including HCP and MOLST forms in [facility]. Part of Palliative Medicine extended evaluation and management also involves coordination of care with our IDT, the primary and consulting feroz, and unit CM/SW and Hospice if eligible. Recommendations based on the information gathered and discussed are outline in the AP of our notes.    ************************************************************************

## 2020-10-26 NOTE — PROGRESS NOTE ADULT - PROBLEM SELECTOR PLAN 4
- presumably hypervolemic hyponatremia in setting of volume overload, likely chronic would correct <6-8meq per 24h  - Given hypervolemic state and elevated urine Na and FENa - concern for development of renal failure vs SIADH  - Fluid restrict 1.5L +/- salt tabs. Correct 6-8meq/day  - Diuresis as above blood cx with staph epidermidis    -suspect contaminant  -f/u repeat blood cx

## 2020-10-26 NOTE — PROGRESS NOTE ADULT - PROBLEM SELECTOR PLAN 5
- HyperK of unclear etiology, perhaps MOISES  - s/p Calcium gluconate, insulin in ED  - reassess BMP - presumably hypervolemic hyponatremia in setting of volume overload, likely chronic would correct <6-8meq per 24h  - Given hypervolemic state and elevated urine Na and FENa - concern for development of renal failure vs SIADH  - Fluid restrict 1.5L +/- salt tabs. Correct 6-8meq/day  - Diuresis as above

## 2020-10-26 NOTE — PROGRESS NOTE ADULT - PROBLEM SELECTOR PLAN 3
pt w/ hx dysarthria, CAP, now w/ focal consolidations in RML RLL  - f/u sputum cx  - f/u urine legionella  - c/w zosyn (10/23 - )  - d/c lc (10/23-10/24) pt w/ hx dysarthria, CAP, now w/ focal consolidations in RML RLL  - f/u sputum cx  - f/u urine legionella  - c/w zosyn (10/23 - ), day 4/7  - s/p azithro (10/23-10/24)

## 2020-10-26 NOTE — PROGRESS NOTE ADULT - PROBLEM SELECTOR PLAN 10
Unable to reach family for collateral, Older son OLDEST SON ALEJO -[phone]    PCP Allison BYRNE form in CHART, DNR/DNI HCP, Jarred Gill (son) @ [phone]. Pt has other sons who are also involved in her care   PCP Allison BYRNE form in Chart, DNR/DNI HCP, Jarred Jairo (son) @ [phone]. Per palliative GOC note plan is for hospice (inpatient preferred)  PCP Allison BYRNE form in Chart, DNR/DNI

## 2020-10-26 NOTE — PROGRESS NOTE ADULT - ASSESSMENT
89 yo F with PMHx depression w/ suicide attempt, rhabdomyolysis, CVA, w/ dysarthria, falls now wheel chair bound, admitted in Jan 2020 for AMS who presents for hypoxemic, hypercarbic respiratory failure 2/2 ADHF and aspiration PNA.      89 yo F with PMHx depression w/ suicide attempt, rhabdomyolysis, CVA, w/ dysarthria, falls now wheel chair bound, admitted in Jan 2020 for AMS who presents for hypoxemic, hypercarbic respiratory failure 2/2 ADHF and aspiration PNA. Zosyn for asp PNA, lasix for HF, AVAPS for respiratory failure with improvement in mental status.

## 2020-10-26 NOTE — PROGRESS NOTE ADULT - SUBJECTIVE AND OBJECTIVE BOX
*INCOMPLETE NOTE*  *******************************************************  Adriel Rivera MD PGY-1  Internal Medicine  Pager [phone] / [phone]  *******************************************************  Patient is a 89y old  Female who presents with a chief complaint of hypoxia (25 Oct 2020 16:57)        SUBJECTIVE / OVERNIGHT EVENTS:  - No acute events overnight.       NOTE TO BE UPDATED AFTER ROUNDS               *******************************************************  Adriel Rivera MD PGY-1  Internal Medicine  Pager ([facility]) [phone] / ([phone]) [phone]  *******************************************************  Patient is a 89y old  Female who presents with a chief complaint of hypoxia (26 Oct 2020 13:40)      SUBJECTIVE / OVERNIGHT EVENTS:  - No acute events overnight.   - Patient seen and evaluated at bedside.  - Patient is more awake/alert. Still on AVAPS so difficult to communicate but patient expresses hunger. Still taking relatively shallow breaths (-400s)    ------------------------------------------------------------------------------------------------------------    MEDICATIONS  (STANDING):  aspirin enteric coated 81 milliGRAM(s) Oral daily  dextrose 5%. 1000 milliLiter(s) (50 mL/Hr) IV Continuous <Continuous>  dextrose 50% Injectable 12.5 Gram(s) IV Push once  dextrose 50% Injectable 25 Gram(s) IV Push once  dextrose 50% Injectable 25 Gram(s) IV Push once  enoxaparin Injectable 40 milliGRAM(s) SubCutaneous daily  furosemide   Injectable 40 milliGRAM(s) IV Push daily  hydrALAZINE 25 milliGRAM(s) Oral every 8 hours  isosorbide   dinitrate Tablet (ISORDIL) 10 milliGRAM(s) Oral three times a day  piperacillin/tazobactam IVPB.. 3.375 Gram(s) IV Intermittent every 8 hours  polyethylene glycol 3350 17 Gram(s) Oral two times a day    MEDICATIONS  (PRN):  dextrose 40% Gel 15 Gram(s) Oral once PRN Blood Glucose LESS THAN 70 milliGRAM(s)/deciLiter  glucagon  Injectable 1 milliGRAM(s) IntraMuscular once PRN Glucose <70 milliGRAM(s)/deciLiter      ------------------------------------------------------------------------------------------------------------    OBJECTIVE:    CAPILLARY BLOOD GLUCOSE  POCT Blood Glucose.: 121 mg/dL (26 Oct 2020 13:15)  POCT Blood Glucose.: 75 mg/dL (26 Oct 2020 12:52)  POCT Blood Glucose.: 70 mg/dL (26 Oct 2020 12:25)  POCT Blood Glucose.: 68 mg/dL (26 Oct 2020 12:21)  POCT Blood Glucose.: 82 mg/dL (26 Oct 2020 06:55)  POCT Blood Glucose.: 104 mg/dL (26 Oct 2020 02:06)  POCT Blood Glucose.: 70 mg/dL (26 Oct 2020 01:06)  POCT Blood Glucose.: 76 mg/dL (26 Oct 2020 00:08)  POCT Blood Glucose.: 67 mg/dL (26 Oct 2020 00:06)  POCT Blood Glucose.: 81 mg/dL (25 Oct 2020 21:25)  POCT Blood Glucose.: 103 mg/dL (25 Oct 2020 19:19)  POCT Blood Glucose.: 134 mg/dL (25 Oct 2020 18:31)  POCT Blood Glucose.: 73 mg/dL (25 Oct 2020 17:22)  POCT Blood Glucose.: 65 mg/dL (25 Oct 2020 17:19)    I&O's Summary    25 Oct 2020 07:01  -  26 Oct 2020 07:00  --------------------------------------------------------  IN: 0 mL / OUT: 3350 mL / NET: -3350 mL    26 Oct 2020 07:01  -  26 Oct 2020 14:24  --------------------------------------------------------  IN: 0 mL / OUT: 1875 mL / NET: -1875 mL      Daily     Daily     PHYSICAL EXAM:  T(F): 97.6, Max: 97.7 (10-25-20 @ 22:29)  HR: 63 (54 - 63)  BP: 142/85 (123/69 - 152/76)  RR: 16 (16 - 17)  SpO2: 100% (97% - 100%)      PHYSICAL EXAM  GENERAL: obese, ill appearing, on AVAPS, NAD   HEAD:  Atraumatic, normocephalic  EYES: EMOI, sclera clear  CHEST/LUNG: difficult to appreciate due to habitus but scattered rales upper lung fields b/l; no wheeze  HEART: RRR; S1, S2; no M/R/G  ABDOMEN: soft, non-distended; non-tender; BS present  EXTREMITIES:  2+ pitting edema b/l, mild tenderness; peripheral pulses intact;   NEURO: non-focal  PSYCH: not assessed  SKIN: proximal extremities warm, no rashes or lesions    ------------------------------------------------------------------------------------------------------------  LABS:                        13.1   7.53  )-----------( 175      ( 26 Oct 2020 08:25 )             42.0     10-26    129<L>  |  88<L>  |  15  ----------------------------<  78  4.0   |  29  |  0.65    Ca    8.6      26 Oct 2020 08:25  Phos  3.5     10-26  Mg     1.6     10-26    TPro  6.4  /  Alb  3.1<L>  /  TBili  0.8  /  DBili  x   /  AST  21  /  ALT  15  /  AlkPhos  88  10-26              Culture - Urine (collected 24 Oct 2020 12:21)  Source: .Urine Clean Catch (Midstream)  Preliminary Report (25 Oct 2020 18:44):    >100,000 CFU/ml Pseudomonas aeruginosa    Culture - Blood (collected 23 Oct 2020 18:40)  Source: .Blood Blood-Venous  Gram Stain (24 Oct 2020 20:57):    Growth in aerobic and anaerobic bottles: Gram Positive Cocci in Clusters  Final Report (26 Oct 2020 12:40):    Growth in aerobic and anaerobic bottles: Staphylococcus epidermidis    Culture - Blood (collected 23 Oct 2020 18:20)  Source: .Blood Blood-Peripheral  Gram Stain (24 Oct 2020 19:59):    Growth in anaerobic bottle: Gram Positive Cocci in Clusters    Growth in aerobic bottle: Gram Positive Cocci in Clusters  Final Report (26 Oct 2020 12:39):    Growth in aerobic and anaerobic bottles: Staphylococcus epidermidis    "Due to technical problems, Proteus sp. will Not be reported as part of    the BCID panel until further notice"    ***Blood Panel PCR results on this specimen are available    approximately 3 hours after the Gram stain result.***    Gram stain, PCR, and/or culture results may not always    correspond due to difference in methodologies.    ************************************************************    This PCR assay was performed using [employer].    The following targets are tested for: Enterococcus,    vancomycin resistant enterococci, Listeria monocytogenes,    coagulase negative staphylococci, S. aureus,    methicillin resistant S. aureus, Streptococcus agalactiae    (Group B), S. pneumoniae, S. pyogenes (Group A),    Acinetobacter baumannii, Enterobacter cloacae, E. coli,    Klebsiella oxytoca, K. pneumoniae, Proteus sp.,    Serratia marcescens, Haemophilus influenzae,    Neisseria meningitidis, Pseudomonas aeruginosa, Candida    albicans, C. glabrata, C krusei, C parapsilosis,    C. tropicalis and the KPC resistance gene.  Organism: Blood Culture PCR  Staphylococcus epidermidis (26 Oct 2020 12:39)  Organism: Staphylococcus epidermidis (26 Oct 2020 12:39)  Organism: Blood Culture PCR (26 Oct 2020 12:39)    Culture - Urine (collected 23 Oct 2020 16:38)  Source: .Urine Clean Catch (Midstream)  Final Report (25 Oct 2020 19:50):    >100,000 CFU/ml Pseudomonas aeruginosa  Organism: Pseudomonas aeruginosa (25 Oct 2020 19:50)  Organism: Pseudomonas aeruginosa (25 Oct 2020 19:50)        RADIOLOGY & ADDITIONAL TESTS:  Results Reviewed:     Imaging Personally Reviewed:  Electrocardiogram Personally Reviewed:      COORDINATION OF CARE:  Care discussed with consultants/other providers and notes reviewed [Y]/[N]:   Prior or Outpatient Records Reviewed [Y]/[N]:   ------------------------------------------------------------------------------------------------------------

## 2020-10-26 NOTE — CONSULT NOTE ADULT - CONVERSATION DETAILS
I spoke to Jarred Gill (son) about the patient's diagnosis, prognosis, code status and also discussed hospice with him:    1) JARRED confirms he is the HCP as Jerry (his father) is . HCP named Jarred as the secondary agent    2) Jarred re-affirmed DNR DNI status    3) Jarred is interested in hospice BUT because his wife's mother  at home, he says he does not want the patient to be in the home with hospice    4) Jarred says paying out of pocket for inpatient hospice would not be an issue

## 2020-10-26 NOTE — CONSULT NOTE ADULT - PROBLEM SELECTOR RECOMMENDATION 5
.  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 Son, Jarred, is interested in paying out of pocket for a hospice facility (or maybe a nursing home with hospice). He does not want to take the patient home.     Due to visitation restrictions in the hospital in light of COVID pandemic, all discussions with the patient/ patient's healthcare proxy or surrogate have been done via telehealth. This is to prevent spread of infection within the hospital and out in the community. Total time discussing advance care planning, goals of care discussions, and discussions about code status and hospice = 16 mins (12:30 to 12:46)

## 2020-10-26 NOTE — PROGRESS NOTE ADULT - PROBLEM SELECTOR PLAN 2
pt in acute decompensated heart failure, appears hypervolemic on exam. Unclear etiology. ACS workup negative  - CXR w/ pulmonary edema  - Elevated BNP  - f/u TTE  - Strict IOs   - Daily weights  - c/w lasix 40 IVP qd (lasix naive), goal net -1L/d  - Cards consulted, recs reviewed. pt in acute decompensated heart failure, appears hypervolemic on exam. Unclear etiology. ACS workup negative  - CXR w/ pulmonary edema  - Strict IOs   - Daily weights  - c/w lasix 40 IVP qd, goal net -1L/d  - Cards consulted, recs reviewed.  - pending portable TTE  - hydral and isordil

## 2020-10-27 DIAGNOSIS — I48.0 PAROXYSMAL ATRIAL FIBRILLATION: ICD-10-CM

## 2020-10-27 LAB
ANION GAP SERPL CALC-SCNC: 8 MMO/L — SIGNIFICANT CHANGE UP (ref 7–14)
BASE EXCESS BLDV CALC-SCNC: 16.3 MMOL/L — SIGNIFICANT CHANGE UP
BASOPHILS # BLD AUTO: 0.04 K/UL — SIGNIFICANT CHANGE UP (ref 0–0.2)
BASOPHILS NFR BLD AUTO: 0.7 % — SIGNIFICANT CHANGE UP (ref 0–2)
BLOOD GAS VENOUS - CREATININE: 0.65 MG/DL — SIGNIFICANT CHANGE UP (ref 0.5–1.3)
BUN SERPL-MCNC: 11 MG/DL — SIGNIFICANT CHANGE UP (ref 7–23)
CALCIUM SERPL-MCNC: 8.6 MG/DL — SIGNIFICANT CHANGE UP (ref 8.4–10.5)
CHLORIDE BLDV-SCNC: 85 MMOL/L — LOW (ref 96–108)
CHLORIDE SERPL-SCNC: 86 MMOL/L — LOW (ref 98–107)
CO2 SERPL-SCNC: 40 MMOL/L — HIGH (ref 22–31)
CREAT SERPL-MCNC: 0.63 MG/DL — SIGNIFICANT CHANGE UP (ref 0.5–1.3)
EOSINOPHIL # BLD AUTO: 0.23 K/UL — SIGNIFICANT CHANGE UP (ref 0–0.5)
EOSINOPHIL NFR BLD AUTO: 3.8 % — SIGNIFICANT CHANGE UP (ref 0–6)
GAS PNL BLDV: 131 MMOL/L — LOW (ref 136–146)
GLUCOSE BLDC GLUCOMTR-MCNC: 125 MG/DL — HIGH (ref 70–99)
GLUCOSE BLDC GLUCOMTR-MCNC: 129 MG/DL — HIGH (ref 70–99)
GLUCOSE BLDC GLUCOMTR-MCNC: 65 MG/DL — LOW (ref 70–99)
GLUCOSE BLDC GLUCOMTR-MCNC: 74 MG/DL — SIGNIFICANT CHANGE UP (ref 70–99)
GLUCOSE BLDC GLUCOMTR-MCNC: 80 MG/DL — SIGNIFICANT CHANGE UP (ref 70–99)
GLUCOSE BLDC GLUCOMTR-MCNC: 82 MG/DL — SIGNIFICANT CHANGE UP (ref 70–99)
GLUCOSE BLDC GLUCOMTR-MCNC: 94 MG/DL — SIGNIFICANT CHANGE UP (ref 70–99)
GLUCOSE BLDV-MCNC: 99 MG/DL — SIGNIFICANT CHANGE UP (ref 70–99)
GLUCOSE SERPL-MCNC: 96 MG/DL — SIGNIFICANT CHANGE UP (ref 70–99)
HCO3 BLDV-SCNC: 37 MMOL/L — HIGH (ref 20–27)
HCT VFR BLD CALC: 40.2 % — SIGNIFICANT CHANGE UP (ref 34.5–45)
HCT VFR BLDV CALC: 41.7 % — SIGNIFICANT CHANGE UP (ref 34.5–45)
HGB BLD-MCNC: 12.7 G/DL — SIGNIFICANT CHANGE UP (ref 11.5–15.5)
HGB BLDV-MCNC: 13.6 G/DL — SIGNIFICANT CHANGE UP (ref 11.5–15.5)
IMM GRANULOCYTES NFR BLD AUTO: 0.2 % — SIGNIFICANT CHANGE UP (ref 0–1.5)
LACTATE BLDV-MCNC: 1.3 MMOL/L — SIGNIFICANT CHANGE UP (ref 0.5–2)
LYMPHOCYTES # BLD AUTO: 0.95 K/UL — LOW (ref 1–3.3)
LYMPHOCYTES # BLD AUTO: 15.5 % — SIGNIFICANT CHANGE UP (ref 13–44)
MAGNESIUM SERPL-MCNC: 1.6 MG/DL — SIGNIFICANT CHANGE UP (ref 1.6–2.6)
MCHC RBC-ENTMCNC: 28.8 PG — SIGNIFICANT CHANGE UP (ref 27–34)
MCHC RBC-ENTMCNC: 31.6 % — LOW (ref 32–36)
MCV RBC AUTO: 91.2 FL — SIGNIFICANT CHANGE UP (ref 80–100)
MONOCYTES # BLD AUTO: 0.51 K/UL — SIGNIFICANT CHANGE UP (ref 0–0.9)
MONOCYTES NFR BLD AUTO: 8.3 % — SIGNIFICANT CHANGE UP (ref 2–14)
NEUTROPHILS # BLD AUTO: 4.37 K/UL — SIGNIFICANT CHANGE UP (ref 1.8–7.4)
NEUTROPHILS NFR BLD AUTO: 71.5 % — SIGNIFICANT CHANGE UP (ref 43–77)
NRBC # FLD: 0 K/UL — SIGNIFICANT CHANGE UP (ref 0–0)
PCO2 BLDV: 70 MMHG — HIGH (ref 41–51)
PH BLDV: 7.4 PH — SIGNIFICANT CHANGE UP (ref 7.32–7.43)
PHOSPHATE SERPL-MCNC: 2.9 MG/DL — SIGNIFICANT CHANGE UP (ref 2.5–4.5)
PLATELET # BLD AUTO: 190 K/UL — SIGNIFICANT CHANGE UP (ref 150–400)
PMV BLD: 9.5 FL — SIGNIFICANT CHANGE UP (ref 7–13)
PO2 BLDV: 40 MMHG — SIGNIFICANT CHANGE UP (ref 35–40)
POTASSIUM BLDV-SCNC: 3.3 MMOL/L — LOW (ref 3.4–4.5)
POTASSIUM SERPL-MCNC: 3.4 MMOL/L — LOW (ref 3.5–5.3)
POTASSIUM SERPL-SCNC: 3.4 MMOL/L — LOW (ref 3.5–5.3)
RBC # BLD: 4.41 M/UL — SIGNIFICANT CHANGE UP (ref 3.8–5.2)
RBC # FLD: 14.6 % — HIGH (ref 10.3–14.5)
SAO2 % BLDV: 74.4 % — SIGNIFICANT CHANGE UP (ref 60–85)
SODIUM SERPL-SCNC: 134 MMOL/L — LOW (ref 135–145)
WBC # BLD: 6.11 K/UL — SIGNIFICANT CHANGE UP (ref 3.8–10.5)
WBC # FLD AUTO: 6.11 K/UL — SIGNIFICANT CHANGE UP (ref 3.8–10.5)

## 2020-10-27 PROCEDURE — 99233 SBSQ HOSP IP/OBS HIGH 50: CPT | Mod: GC

## 2020-10-27 PROCEDURE — 99233 SBSQ HOSP IP/OBS HIGH 50: CPT

## 2020-10-27 RX ORDER — METOPROLOL TARTRATE 50 MG
12.5 TABLET ORAL
Refills: 0 | Status: DISCONTINUED | OUTPATIENT
Start: 2020-10-27 | End: 2020-10-27

## 2020-10-27 RX ORDER — METOPROLOL TARTRATE 50 MG
25 TABLET ORAL
Refills: 0 | Status: DISCONTINUED | OUTPATIENT
Start: 2020-10-27 | End: 2020-10-27

## 2020-10-27 RX ORDER — HYDRALAZINE HCL 50 MG
5 TABLET ORAL EVERY 8 HOURS
Refills: 0 | Status: DISCONTINUED | OUTPATIENT
Start: 2020-10-27 | End: 2020-10-27

## 2020-10-27 RX ORDER — POTASSIUM CHLORIDE 20 MEQ
40 PACKET (EA) ORAL ONCE
Refills: 0 | Status: COMPLETED | OUTPATIENT
Start: 2020-10-27 | End: 2020-10-27

## 2020-10-27 RX ORDER — METOPROLOL TARTRATE 50 MG
25 TABLET ORAL DAILY
Refills: 0 | Status: DISCONTINUED | OUTPATIENT
Start: 2020-10-27 | End: 2020-10-27

## 2020-10-27 RX ORDER — FUROSEMIDE 40 MG
40 TABLET ORAL DAILY
Refills: 0 | Status: DISCONTINUED | OUTPATIENT
Start: 2020-10-28 | End: 2020-10-29

## 2020-10-27 RX ORDER — HYDRALAZINE HCL 50 MG
25 TABLET ORAL EVERY 8 HOURS
Refills: 0 | Status: DISCONTINUED | OUTPATIENT
Start: 2020-10-27 | End: 2020-10-31

## 2020-10-27 RX ORDER — POTASSIUM CHLORIDE 20 MEQ
40 PACKET (EA) ORAL ONCE
Refills: 0 | Status: DISCONTINUED | OUTPATIENT
Start: 2020-10-27 | End: 2020-10-27

## 2020-10-27 RX ORDER — DEXTROSE 50 % IN WATER 50 %
12.5 SYRINGE (ML) INTRAVENOUS ONCE
Refills: 0 | Status: COMPLETED | OUTPATIENT
Start: 2020-10-27 | End: 2020-10-27

## 2020-10-27 RX ORDER — MAGNESIUM SULFATE 500 MG/ML
2 VIAL (ML) INJECTION ONCE
Refills: 0 | Status: COMPLETED | OUTPATIENT
Start: 2020-10-27 | End: 2020-10-27

## 2020-10-27 RX ORDER — METOPROLOL TARTRATE 50 MG
2.5 TABLET ORAL ONCE
Refills: 0 | Status: COMPLETED | OUTPATIENT
Start: 2020-10-27 | End: 2020-10-27

## 2020-10-27 RX ORDER — METOPROLOL TARTRATE 50 MG
25 TABLET ORAL
Refills: 0 | Status: DISCONTINUED | OUTPATIENT
Start: 2020-10-27 | End: 2020-10-31

## 2020-10-27 RX ADMIN — PIPERACILLIN AND TAZOBACTAM 25 GRAM(S): 4; .5 INJECTION, POWDER, LYOPHILIZED, FOR SOLUTION INTRAVENOUS at 21:06

## 2020-10-27 RX ADMIN — Medication 40 MILLIGRAM(S): at 05:57

## 2020-10-27 RX ADMIN — ISOSORBIDE DINITRATE 10 MILLIGRAM(S): 5 TABLET ORAL at 13:22

## 2020-10-27 RX ADMIN — ENOXAPARIN SODIUM 40 MILLIGRAM(S): 100 INJECTION SUBCUTANEOUS at 11:38

## 2020-10-27 RX ADMIN — Medication 12.5 GRAM(S): at 07:38

## 2020-10-27 RX ADMIN — Medication 40 MILLIEQUIVALENT(S): at 13:22

## 2020-10-27 RX ADMIN — Medication 50 GRAM(S): at 13:22

## 2020-10-27 RX ADMIN — Medication 2.5 MILLIGRAM(S): at 11:38

## 2020-10-27 RX ADMIN — Medication 25 MILLIGRAM(S): at 13:22

## 2020-10-27 RX ADMIN — Medication 25 MILLIGRAM(S): at 17:35

## 2020-10-27 RX ADMIN — PIPERACILLIN AND TAZOBACTAM 25 GRAM(S): 4; .5 INJECTION, POWDER, LYOPHILIZED, FOR SOLUTION INTRAVENOUS at 17:16

## 2020-10-27 RX ADMIN — PIPERACILLIN AND TAZOBACTAM 25 GRAM(S): 4; .5 INJECTION, POWDER, LYOPHILIZED, FOR SOLUTION INTRAVENOUS at 05:56

## 2020-10-27 RX ADMIN — Medication 81 MILLIGRAM(S): at 11:38

## 2020-10-27 NOTE — PROGRESS NOTE ADULT - PROBLEM SELECTOR PLAN 8
- ucx with pseudomonas sensitive to zosyn  - Maintain narayan for UOP monitoring.   - Bowel regimen, stool count given hx of constipation DVT: LVX    Diet: dysphagia 1 with nectar thickened liquids  Fall, aspiration precautions HOB 45

## 2020-10-27 NOTE — PROGRESS NOTE ADULT - PROBLEM SELECTOR PLAN 10
HCP, Jarred Jairo (son) @ [phone]. Per palliative GOC note plan is for hospice (inpatient preferred)  PCP Allison BYRNE form in Chart, DNR/DNI

## 2020-10-27 NOTE — SWALLOW BEDSIDE ASSESSMENT ADULT - SWALLOW EVAL: RECOMMENDED FEEDING/EATING TECHNIQUES
crush medication (when feasible)/position upright (90 degrees)/small sips/bites/alternate food with liquid/maintain upright posture during/after eating for 30 mins

## 2020-10-27 NOTE — PROGRESS NOTE ADULT - ATTENDING COMMENTS
Personally saw and examined patient  Labs and vitals reviewed  Agree with assessment and plan above  now off non invasive ventilation, cont tx for asp pna and uti per primary team.  volume status hard to assess here, but pt without edema, and dry mucus membranes  will cont to monitor  cont home regimen of lasix, hydral/isordil

## 2020-10-27 NOTE — PROGRESS NOTE ADULT - PROBLEM SELECTOR PLAN 5
- presumably hypervolemic hyponatremia in setting of volume overload, likely chronic would correct <6-8meq per 24h  - Given hypervolemic state and elevated urine Na and FENa - concern for development of renal failure vs SIADH  - Fluid restrict 1.5L +/- salt tabs. Correct 6-8meq/day  - Diuresis as above Appears to be back at baseline. DDx for encephalopathy included hyponatremia, hypercarbia, and hypoxemia

## 2020-10-27 NOTE — PROGRESS NOTE ADULT - PROBLEM SELECTOR PROBLEM 3
Aspiration pneumonia due to regurgitated food, unspecified laterality, unspecified part of lung Paroxysmal atrial fibrillation

## 2020-10-27 NOTE — PROGRESS NOTE ADULT - ASSESSMENT
89 yo F with PMHx depression w/ suicide attempt, rhabdomyolysis, CVA, w/ dysarthria, falls now wheel chair bound, admitted in Jan 2020 for AMS who presents for hypoxemic, hypercarbic respiratory failure 2/2 ADHF and aspiration PNA.     Hypoxic respiratory failure  Likely multifactorial  likely HFpEF and diastolic dysfunction  c/w lasix 40mg IV daily  c/w hydrALAZINE 25 milliGRAM(s) TID  c/w isordil 10mg TID  would increase metoprolol 25mg BID  Daily weights  monitor i/o    afib  continue with metoprolol 25mg BID  Pt. high risk for CVA given comorbidities however given hx of CVA, depression and suicide attempt would defer a/c at this time    Loi Lynch  Cardiology Fellow  [phone]    All Cardiology service information can be found 24/7 on [email or web address], password: [email or web address]

## 2020-10-27 NOTE — PROGRESS NOTE ADULT - PROBLEM SELECTOR PLAN 6
- Metabolic encephalopathy like 2/2 hypercarbia, HF, hypoNa, PNA, hypoxia.   - AOx3 baseline per outpt notes.  - Much more awake/alert than Saturday but hard to communicate with mask on  - CTM - hx CVA   - cw ASA

## 2020-10-27 NOTE — PROGRESS NOTE ADULT - PROBLEM SELECTOR PROBLEM 4
Bacteremia Aspiration pneumonia due to regurgitated food, unspecified laterality, unspecified part of lung

## 2020-10-27 NOTE — PROGRESS NOTE ADULT - ASSESSMENT
89 yo F with PMHx depression w/ suicide attempt, rhabdomyolysis, CVA, w/ dysarthria, falls now wheel chair bound, admitted in Jan 2020 for AMS who presents for hypoxemic, hypercarbic respiratory failure 2/2 ADHF and aspiration PNA. Zosyn for asp PNA, lasix for HF, AVAPS for respiratory failure with improvement in mental status. Patient is no longer hypoxemic and acid/base status is improving, will trial off AVAPS today.

## 2020-10-27 NOTE — PROGRESS NOTE ADULT - PROBLEM SELECTOR PLAN 4
blood cx with staph epidermidis    -suspect contaminant  -f/u repeat blood cx pt w/ hx dysarthria, CAP, now w/ focal consolidations in RML RLL  - f/u sputum cx  - f/u urine legionella  - c/w zosyn (10/23 - ), day 5/7  - s/p azithro (10/23-10/24)

## 2020-10-27 NOTE — SWALLOW BEDSIDE ASSESSMENT ADULT - ORAL PHASE
Decreased anterior-posterior movement of the bolus/Delayed oral transit time Decreased anterior-posterior movement of the bolus/Delayed oral transit time/suspected posterior loss Delayed oral transit time/Decreased anterior-posterior movement of the bolus/Lingual stasis

## 2020-10-27 NOTE — SWALLOW BEDSIDE ASSESSMENT ADULT - COMMENTS
H&P 10/24/2020: 89 yo F with PMHx depression w/ suicide attempt, rhabdomyolysis, CVA, w/ dysarthria, falls now wheel chair bound, admitted in Jan 2020 for AMS who presents for hypoxemic, hypercarbic respiratory failure 2/2 ADHF and aspiration PNA.  CXR 10/23/2020: External object overlies and partially obscures lower half of left hemithorax effusion and/or underlying parenchymal consolidation cannot be excluded. Grossly clear remaining visualized lungs. No pneumothorax. Stable prominent appearing cardiac and mediastinal silhouettes and aortic and mitral annular calcifications.    Consult received and chart reviewed. Upon arrival, patient in bed with AVAPS non-breather mask; RN reporting unsure if patient able to tolerate nasal cannula or room air. Not appropriate for PO trials at this time. Attempted to page team. Reconsult when patient’s overall medical status improves and is able to tolerate nasal cannula or room air.
Per charting, the patient is a 89 yo F with PMHx depression w/ suicide attempt, rhabdomyolysis, CVA, w/ dysarthria, falls now wheel chair bound, admitted in Jan 2020 for AMS who presents for hypoxemic, hypercarbic respiratory failure 2/2 ADHF and aspiration PNA. Zosyn for asp PNA, lasix for HF, AVAPS for respiratory failure with improvement in mental status. Patient is no longer hypoxemic and acid/base status is improving, will trial off AVAPS today.    XR CHEST 10/23: "Grossly clear remaining visualized lungs. No pneumothorax. Stable prominent appearing cardiac and mediastinal silhouettes and aortic and mitral annular calcifications. Generalized osteopenia again noted."    Consult received and chart reviewed. The patient was seen at bedside this morning for an assessment of swallow function, at which time she was awake/alert and oriented to self and place. The patient was able to follow simple directives and answer yes/no questions. RN removed BiPAP and reported patient can tolerate nasal cannula.

## 2020-10-27 NOTE — PROGRESS NOTE ADULT - PROBLEM SELECTOR PLAN 1
acute hypoxemic hypercapnic respiratory failure in the setting of volume overload and possible aspiration event.    -ABG yesterday with normal pH, high CO2 and elevated bicarb (renal compensation). Will trial patient off AVAPS during the day  -watch closely for signs of AMS   -bedside swallow eval pending diet  - C/w AVAPS at night  - Appreciate pulm recs re AVAPS weaning acute hypoxemic hypercapnic respiratory failure in the setting of volume overload and possible aspiration event.    -ABG yesterday with normal pH, high CO2 and elevated bicarb (renal compensation). Will trial patient off AVAPS during the day  - watch closely for signs of AMS   - passed swallow eval for nectar thickened foods - diet ordered  - C/w AVAPS at night  - Appreciate pulm recs re AVAPS weaning

## 2020-10-27 NOTE — PROGRESS NOTE ADULT - SUBJECTIVE AND OBJECTIVE BOX
Patient seen and examined at bedside.    Overnight Events:   This morning patient converted to afib with RVR  No complaints this morning  Pt. now off BIPAP and feeling less short of breath than yesterday    Current Meds:  aspirin enteric coated 81 milliGRAM(s) Oral daily  dextrose 40% Gel 15 Gram(s) Oral once PRN  dextrose 5%. 1000 milliLiter(s) IV Continuous <Continuous>  dextrose 50% Injectable 12.5 Gram(s) IV Push once  dextrose 50% Injectable 25 Gram(s) IV Push once  dextrose 50% Injectable 25 Gram(s) IV Push once  enoxaparin Injectable 40 milliGRAM(s) SubCutaneous daily  furosemide   Injectable 40 milliGRAM(s) IV Push daily  glucagon  Injectable 1 milliGRAM(s) IntraMuscular once PRN  hydrALAZINE 25 milliGRAM(s) Oral every 8 hours  isosorbide   dinitrate Tablet (ISORDIL) 10 milliGRAM(s) Oral three times a day  metoprolol succinate ER 12.5 milliGRAM(s) Oral two times a day  metoprolol tartrate Injectable 2.5 milliGRAM(s) IV Push once  piperacillin/tazobactam IVPB.. 3.375 Gram(s) IV Intermittent every 8 hours  polyethylene glycol 3350 17 Gram(s) Oral two times a day  potassium chloride    Tablet ER 40 milliEquivalent(s) Oral once        Vitals:  T(F): 98.8 (10-27), Max: 98.8 (10-27)  HR: 96 (10-27) (61 - 96)  BP: 154/70 (10-27) (142/85 - 182/71)  RR: 19 (10-27)  SpO2: 95% (10-27)  I&O's Summary    26 Oct 2020 07:01  -  27 Oct 2020 07:00  --------------------------------------------------------  IN: 0 mL / OUT: 2825 mL / NET: -2825 mL        Physical Exam:  Appearance: No acute distress; well appearing  Eyes: PERRL, EOMI, pink conjunctiva  HENT: Normal oral mucosa, blood in left nare  Cardiovascular: irregularly irregular, no murmurs, rubs, or gallops; edema to mid thigh; elevated JVD  Respiratory: Clear to auscultation bilaterally  Gastrointestinal: soft, non-tender, non-distended with normal bowel sounds  Musculoskeletal: No clubbing; no joint deformity   Neurologic: Non-focal  Lymphatic: No lymphadenopathy  Psychiatry: AAOx3, mood & affect appropriate  Skin: No rashes, ecchymoses, or cyanosis                          12.7   6.11  )-----------( 190      ( 27 Oct 2020 09:30 )             40.2     10-27    134<L>  |  86<L>  |  11  ----------------------------<  96  3.4<L>   |  40<H>  |  0.63    Ca    8.6      27 Oct 2020 09:30  Phos  2.9     10-27  Mg     1.6     10-27    TPro  6.4  /  Alb  3.1<L>  /  TBili  0.8  /  DBili  x   /  AST  21  /  ALT  15  /  AlkPhos  88  10-26          Serum Pro-Brain Natriuretic Peptide: 17986 pg/mL (10-23 @ 13:30)          New ECG(s): Personally reviewed    Echo:  e< from: Transthoracic Echocardiogram (10.26.20 @ 14:13) >  CONCLUSIONS:  1. Severe mitral annular calcification and calcified mitral  leaflets with decreased diastolic opening. Mild mitral  regurgitation.  Mean transmitral valve gradient equals 6 mm  Hg, consistent with moderate functional mitral stenosis.  2. Severely dilated left atrium.  LA volume index = 58  cc/m2.  3. Normal left ventricular internal dimensions and wall  thicknesses.  4. Endocardium not well visualized; grossly normal left  ventricular systolic function.  5. Right ventricular enlargement with decreased right  ventricular systolic function.  6. Estimated right ventricular systolic pressure equals 81  mm Hg, assuming right atrial pressure equals 10 mm Hg,  consistent with severe pulmonary hypertension.    < end of copied text >    Stress Testing:     Cath:    Imaging:    Interpretation of Telemetry:  sinus 60's now in afib with RVR to 130's

## 2020-10-27 NOTE — SWALLOW BEDSIDE ASSESSMENT ADULT - SWALLOW EVAL: PROGNOSIS
DIAGNOSIS CONTINUED: 5. Recommend dysphagia 1 puree with nectar thick liquids + aspiration precautions.

## 2020-10-27 NOTE — SWALLOW BEDSIDE ASSESSMENT ADULT - SWALLOW EVAL: DIAGNOSIS
1. The patient demonstrated a mild oral dysphagia for puree, mechanical soft, honey thick liquid and nectar thick liquid textures marked by prolonged manipulation resulting in delayed bolus collection, transfer and posterior transport. 2. The patient demonstrated a mild-moderate oral dysphagia for thin liquids marked by delayed bolus collection, transfer and posterior transport with susepcted posterior loss of bolus over base of tongue. 3. The patient demonstrated a mild pharyngeal dysphagia for puree, mechanical soft, honey thick liquid and nectar thick liquid textures marked by suspect delayed initiation of swallow trigger with adequate hyolaryngeal elevation upon digital palpation without evidence of laryngeal penetration. 4. The patient demonstrated a moderate-severe pharyngeal dysphagia for thin liquids marked by suspect delayed initiation of swallow trigger with adequate hyolaryngeal elevation upon digital palpation with a cough post intake suggestive of laryngeal penetration.

## 2020-10-27 NOTE — PROGRESS NOTE ADULT - ATTENDING COMMENTS
Patient seen and examined by me. Case discussed with resident and agree with the resident's findings and plan as documented in the resident's note. 89F h/o depression w/ suicide attempt, rhabdomyolysis, CVA, w/ dysarthria, falls now wheel chair bound, admitted in Jan 2020 for AMS who presents for hypoxemic, hypercarbic respiratory failure 2/2 ADHF and aspiration PNA c/b metabolic encephalopathy and bacteremia 2/2 Staph epidermidis (possible contaminant)    1. Hypoxemic, hypercarbic respiratory failure 2/2 ADHF and superimposed aspiration PNA +/- obesity hypoventilation:  -PNA: c/w zosyn #5/7; f/u urine legionella and sputum cultures  -acute diastolic CHF: c/w lasix 40IV daily; monitor I/Os; patient net negative;  echo reviewed: t< from: Transthoracic Echocardiogram (10.26.20 @ 14:13) >1. Severe mitral annular calcification and calcified mitral leaflets with decreased diastolic opening. Mild mitral regurgitation.  Mean transmitral valve gradient equals 6 mm Hg, consistent with moderate functional mitral stenosis. 2. Severely dilated left atrium.  LA volume index = 58cc/m2. 3. Normal left ventricular internal dimensions and wall thicknesses. 4. Endocardium not well visualized; grossly normal left ventricular systolic function.< end of copied text >; f/u cards recs  -case d/w with pulm re-AVAPS settings -- pulm saying it's "ok to try to take patient OFF avaps and monitor her respiratory status closely; be sure to wait >1hr after eating to put it back on and to also have it on overnights".  -ABG reviewed this am     2. HTN:  -given patient not tolerating PO, will switch to hydralazine 5mg IV TID and keep isordil 10mg tid if able to take    3. H/o CVA:  -improved mental status  -will check speech and swallow    4. Pseudo in urine:  -urine sensitive to zosyn  -on zosyn already    5. Bacteremia 2/2 Staph. Epi:  -BCx sent; will f/u     6. Hyponatremia:  -presumably hypervolemic hyponatremia in setting of volume overload, likely chronic would correct <6-8meq per 24h  -improving  -continue diuresing    7. Metabolic encephalopathy:  -Metabolic encephalopathy like 2/2 hypercarbia, HF, hypoNa, PNA, hypoxia.   -resolved since being on AVAPS    8. Urinary incontinence:  -maintain narayan and monitor UOP for now    9. Dysphagia:  -f/u speech and swallow  -per palliative care consult -- family would like inpatient hospice; f/u palliative care

## 2020-10-27 NOTE — PROGRESS NOTE ADULT - PROBLEM SELECTOR PLAN 7
- hx CVA   - cw ASA - ucx with pseudomonas sensitive to zosyn  - Will attempt TOV tonight  - Bowel regimen

## 2020-10-27 NOTE — PROGRESS NOTE ADULT - PROBLEM SELECTOR PLAN 3
pt w/ hx dysarthria, CAP, now w/ focal consolidations in RML RLL  - f/u sputum cx  - f/u urine legionella  - c/w zosyn (10/23 - ), day 5/7  - s/p azithro (10/23-10/24) Patient developed afib with RVR this morning after IV insertion. Possibly related to stress of hospitalization vs LA enlargement i/s/o mitral stenosis    -lopressor 25 BID  -CHADSVASc 7 but agree with cardiology recommendations, will withhold AC at this time given suicide attempt, history of CVA

## 2020-10-27 NOTE — PROGRESS NOTE ADULT - SUBJECTIVE AND OBJECTIVE BOX
*INCOMPLETE NOTE*  *******************************************************  Adriel Rivera MD PGY-1  Internal Medicine  Pager [phone] / [phone]  *******************************************************  Patient is a 89y old  Female who presents with a chief complaint of hypoxia (26 Oct 2020 13:40)        OVERNIGHT EVENTS:  - Pt was hypertensive d/t being unable to take PO hydral/isordil because NPO. Was given push of IV hydralazine with good result.  - Pt had 4 beats of vtach on telemetry but was asymptomatic. - No intervention  - Pt has POCT glucose reading of 65 but was 80 on repeat testing. - No intervention      NOTE TO BE UPDATED AFTER ROUNDS             *******************************************************  Adriel Rivera MD PGY-1  Internal Medicine  Pager ([facility]) [phone] / [phone]  *******************************************************  Patient is a 89y old  Female who presents with a chief complaint of hypoxia (27 Oct 2020 10:56)        OVERNIGHT EVENTS:  - Pt was hypertensive d/t being unable to take PO hydral/isordil because NPO. Was given push of IV hydralazine with good result.  - Pt had 4 beats of vtach on telemetry but was asymptomatic. - No intervention  - Pt has POCT glucose reading of 65 but was 80 on repeat testing. - No intervention    SUBJECTIVE  - Patient seen and evaluated at bedside.  - Patient was in the middle of IV placement but overall looked well on AVAPS.    ------------------------------------------------------------------------------------------------------------    MEDICATIONS  (STANDING):  aspirin enteric coated 81 milliGRAM(s) Oral daily  dextrose 5%. 1000 milliLiter(s) (50 mL/Hr) IV Continuous <Continuous>  dextrose 50% Injectable 12.5 Gram(s) IV Push once  dextrose 50% Injectable 25 Gram(s) IV Push once  dextrose 50% Injectable 25 Gram(s) IV Push once  enoxaparin Injectable 40 milliGRAM(s) SubCutaneous daily  furosemide   Injectable 40 milliGRAM(s) IV Push daily  hydrALAZINE 25 milliGRAM(s) Oral every 8 hours  isosorbide   dinitrate Tablet (ISORDIL) 10 milliGRAM(s) Oral three times a day  metoprolol succinate ER 25 milliGRAM(s) Oral daily  piperacillin/tazobactam IVPB.. 3.375 Gram(s) IV Intermittent every 8 hours  polyethylene glycol 3350 17 Gram(s) Oral two times a day    MEDICATIONS  (PRN):  dextrose 40% Gel 15 Gram(s) Oral once PRN Blood Glucose LESS THAN 70 milliGRAM(s)/deciLiter  glucagon  Injectable 1 milliGRAM(s) IntraMuscular once PRN Glucose <70 milliGRAM(s)/deciLiter      ------------------------------------------------------------------------------------------------------------    OBJECTIVE:    CAPILLARY BLOOD GLUCOSE      POCT Blood Glucose.: 94 mg/dL (27 Oct 2020 12:10)  POCT Blood Glucose.: 74 mg/dL (27 Oct 2020 06:44)  POCT Blood Glucose.: 80 mg/dL (27 Oct 2020 06:33)  POCT Blood Glucose.: 65 mg/dL (27 Oct 2020 06:31)  POCT Blood Glucose.: 82 mg/dL (27 Oct 2020 00:40)  POCT Blood Glucose.: 96 mg/dL (26 Oct 2020 21:20)  POCT Blood Glucose.: 105 mg/dL (26 Oct 2020 17:49)    I&O's Summary    26 Oct 2020 07:01  -  27 Oct 2020 07:00  --------------------------------------------------------  IN: 0 mL / OUT: 2825 mL / NET: -2825 mL      Daily     Daily     PHYSICAL EXAM:  T(F): 97.7, Max: 98.8 (10-27-20 @ 05:57)  HR: 120 (61 - 134)  BP: 124/73 (124/73 - 182/71)  RR: 18 (17 - 19)  SpO2: 99% (94% - 100%)        PHYSICAL EXAM  GENERAL: obese, mildly ill appearing, on AVAPS, NAD   HEAD:  Atraumatic, normocephalic  EYES: EOMI, sclera clear  CHEST/LUNG: difficult to appreciate due to habitus but CTABL  HEART: irregular; S1, S2; no M/R/G  ABDOMEN: soft, non-distended; non-tender; BS present  EXTREMITIES:  2+ pitting edema upper extremities b/l, mild tenderness; peripheral pulses intact;   NEURO: non-focal  PSYCH: not assessed  SKIN: proximal extremities warm, no rashes or lesions    ------------------------------------------------------------------------------------------------------------  LABS:                        12.7   6.11  )-----------( 190      ( 27 Oct 2020 09:30 )             40.2     10-27    134<L>  |  86<L>  |  11  ----------------------------<  96  3.4<L>   |  40<H>  |  0.63    Ca    8.6      27 Oct 2020 09:30  Phos  2.9     10-27  Mg     1.6     10-27    TPro  6.4  /  Alb  3.1<L>  /  TBili  0.8  /  DBili  x   /  AST  21  /  ALT  15  /  AlkPhos  88  10-26              Culture - Blood (collected 26 Oct 2020 14:00)  Source: .Blood Blood-Venous  Preliminary Report (27 Oct 2020 14:01):    No growth to date.    Culture - Blood (collected 26 Oct 2020 13:57)  Source: .Blood Blood-Peripheral  Preliminary Report (27 Oct 2020 14:01):    No growth to date.        RADIOLOGY & ADDITIONAL TESTS:  Results Reviewed:     Imaging Personally Reviewed:  Electrocardiogram Personally Reviewed:      COORDINATION OF CARE:  Care discussed with consultants/other providers and notes reviewed [Y]/[N]:   Prior or Outpatient Records Reviewed [Y]/[N]:   ------------------------------------------------------------------------------------------------------------

## 2020-10-27 NOTE — PROGRESS NOTE ADULT - PROBLEM SELECTOR PLAN 9
DVT: LVX    Diet: S+S eval then diet     Fall, aspiration precautions HOB 45 HCP, Jarred Jairo (son) @ [phone]. Per palliative GOC note plan is for hospice (inpatient preferred)  PCP Allison BYRNE form in Chart, DNR/DNI

## 2020-10-27 NOTE — PROGRESS NOTE ADULT - PROBLEM SELECTOR PLAN 2
pt in acute decompensated heart failure, appears hypervolemic on exam. Unclear etiology. ACS workup negative  - CXR w/ pulmonary edema  - Strict IOs   - Daily weights  - c/w lasix 40 IVP qd, goal net -1L/d  - Cards consulted, recs reviewed.  - TTE with Mitral stenosis, LA englargement, severe pulm HTN  - hydral and isordil PO so has not been getting pt in acute decompensated heart failure, diastolic LV dysfunction and Mitral stenosis appears hypervolemic on exam. Unclear etiology. ACS workup negative    - Strict IOs, Daily weights  - c/w lasix 40 IVP qd, goal net -1L/d  - Cards consulted, recs reviewed.  - TTE with diastolic dysfunction, Mitral stenosis, LA englargement, severe pulm HTN  - hydral and isordil were PO so has not been getting so was very hypertensive but patient can now take PO

## 2020-10-28 LAB
ALBUMIN SERPL ELPH-MCNC: 3.9 G/DL — SIGNIFICANT CHANGE UP (ref 3.3–5)
ALP SERPL-CCNC: 135 U/L — HIGH (ref 40–120)
ALT FLD-CCNC: 17 U/L — SIGNIFICANT CHANGE UP (ref 4–33)
ANION GAP SERPL CALC-SCNC: 12 MMO/L — SIGNIFICANT CHANGE UP (ref 7–14)
AST SERPL-CCNC: 22 U/L — SIGNIFICANT CHANGE UP (ref 4–32)
BASE EXCESS BLDV CALC-SCNC: 1.3 MMOL/L — SIGNIFICANT CHANGE UP
BILIRUB SERPL-MCNC: 0.6 MG/DL — SIGNIFICANT CHANGE UP (ref 0.2–1.2)
BLOOD GAS VENOUS - CREATININE: 0.75 MG/DL — SIGNIFICANT CHANGE UP (ref 0.5–1.3)
BUN SERPL-MCNC: 6 MG/DL — LOW (ref 7–23)
CALCIUM SERPL-MCNC: 9.4 MG/DL — SIGNIFICANT CHANGE UP (ref 8.4–10.5)
CHLORIDE BLDV-SCNC: 110 MMOL/L — HIGH (ref 96–108)
CHLORIDE SERPL-SCNC: 106 MMOL/L — SIGNIFICANT CHANGE UP (ref 98–107)
CO2 SERPL-SCNC: 23 MMOL/L — SIGNIFICANT CHANGE UP (ref 22–31)
CREAT SERPL-MCNC: 0.72 MG/DL — SIGNIFICANT CHANGE UP (ref 0.5–1.3)
GAS PNL BLDV: 140 MMOL/L — SIGNIFICANT CHANGE UP (ref 136–146)
GLUCOSE BLDC GLUCOMTR-MCNC: 105 MG/DL — HIGH (ref 70–99)
GLUCOSE BLDC GLUCOMTR-MCNC: 106 MG/DL — HIGH (ref 70–99)
GLUCOSE BLDC GLUCOMTR-MCNC: 118 MG/DL — HIGH (ref 70–99)
GLUCOSE BLDC GLUCOMTR-MCNC: 148 MG/DL — HIGH (ref 70–99)
GLUCOSE BLDC GLUCOMTR-MCNC: 94 MG/DL — SIGNIFICANT CHANGE UP (ref 70–99)
GLUCOSE BLDV-MCNC: 174 MG/DL — HIGH (ref 70–99)
GLUCOSE SERPL-MCNC: 171 MG/DL — HIGH (ref 70–99)
HCO3 BLDV-SCNC: 23 MMOL/L — SIGNIFICANT CHANGE UP (ref 20–27)
HCT VFR BLD CALC: 42 % — SIGNIFICANT CHANGE UP (ref 34.5–45)
HCT VFR BLDV CALC: 41.9 % — SIGNIFICANT CHANGE UP (ref 34.5–45)
HGB BLD-MCNC: 13.1 G/DL — SIGNIFICANT CHANGE UP (ref 11.5–15.5)
HGB BLDV-MCNC: 13.6 G/DL — SIGNIFICANT CHANGE UP (ref 11.5–15.5)
LACTATE BLDV-MCNC: 2.8 MMOL/L — HIGH (ref 0.5–2)
MAGNESIUM SERPL-MCNC: 1.9 MG/DL — SIGNIFICANT CHANGE UP (ref 1.6–2.6)
MCHC RBC-ENTMCNC: 26.6 PG — LOW (ref 27–34)
MCHC RBC-ENTMCNC: 31.2 % — LOW (ref 32–36)
MCV RBC AUTO: 85.4 FL — SIGNIFICANT CHANGE UP (ref 80–100)
NRBC # FLD: 0 K/UL — SIGNIFICANT CHANGE UP (ref 0–0)
PCO2 BLDV: 54 MMHG — HIGH (ref 41–51)
PH BLDV: 7.31 PH — LOW (ref 7.32–7.43)
PHOSPHATE SERPL-MCNC: 3.7 MG/DL — SIGNIFICANT CHANGE UP (ref 2.5–4.5)
PLATELET # BLD AUTO: 210 K/UL — SIGNIFICANT CHANGE UP (ref 150–400)
PMV BLD: 10.3 FL — SIGNIFICANT CHANGE UP (ref 7–13)
PO2 BLDV: 20 MMHG — LOW (ref 35–40)
POTASSIUM BLDV-SCNC: 4.5 MMOL/L — SIGNIFICANT CHANGE UP (ref 3.4–4.5)
POTASSIUM SERPL-MCNC: 4.5 MMOL/L — SIGNIFICANT CHANGE UP (ref 3.5–5.3)
POTASSIUM SERPL-SCNC: 4.5 MMOL/L — SIGNIFICANT CHANGE UP (ref 3.5–5.3)
PROT SERPL-MCNC: 6.6 G/DL — SIGNIFICANT CHANGE UP (ref 6–8.3)
RBC # BLD: 4.92 M/UL — SIGNIFICANT CHANGE UP (ref 3.8–5.2)
RBC # FLD: 21.6 % — HIGH (ref 10.3–14.5)
SAO2 % BLDV: 27 % — LOW (ref 60–85)
SODIUM SERPL-SCNC: 141 MMOL/L — SIGNIFICANT CHANGE UP (ref 135–145)
WBC # BLD: 6.77 K/UL — SIGNIFICANT CHANGE UP (ref 3.8–10.5)
WBC # FLD AUTO: 6.77 K/UL — SIGNIFICANT CHANGE UP (ref 3.8–10.5)

## 2020-10-28 PROCEDURE — 99232 SBSQ HOSP IP/OBS MODERATE 35: CPT

## 2020-10-28 PROCEDURE — 99233 SBSQ HOSP IP/OBS HIGH 50: CPT | Mod: GC

## 2020-10-28 PROCEDURE — 99497 ADVNCD CARE PLAN 30 MIN: CPT | Mod: GC,25

## 2020-10-28 RX ADMIN — PIPERACILLIN AND TAZOBACTAM 25 GRAM(S): 4; .5 INJECTION, POWDER, LYOPHILIZED, FOR SOLUTION INTRAVENOUS at 14:39

## 2020-10-28 RX ADMIN — Medication 25 MILLIGRAM(S): at 14:39

## 2020-10-28 RX ADMIN — Medication 81 MILLIGRAM(S): at 13:26

## 2020-10-28 RX ADMIN — PIPERACILLIN AND TAZOBACTAM 25 GRAM(S): 4; .5 INJECTION, POWDER, LYOPHILIZED, FOR SOLUTION INTRAVENOUS at 07:27

## 2020-10-28 RX ADMIN — Medication 25 MILLIGRAM(S): at 22:33

## 2020-10-28 RX ADMIN — POLYETHYLENE GLYCOL 3350 17 GRAM(S): 17 POWDER, FOR SOLUTION ORAL at 07:29

## 2020-10-28 RX ADMIN — ENOXAPARIN SODIUM 40 MILLIGRAM(S): 100 INJECTION SUBCUTANEOUS at 13:26

## 2020-10-28 RX ADMIN — Medication 25 MILLIGRAM(S): at 07:29

## 2020-10-28 RX ADMIN — ISOSORBIDE DINITRATE 10 MILLIGRAM(S): 5 TABLET ORAL at 14:39

## 2020-10-28 RX ADMIN — Medication 40 MILLIGRAM(S): at 07:28

## 2020-10-28 RX ADMIN — Medication 25 MILLIGRAM(S): at 07:28

## 2020-10-28 RX ADMIN — Medication 25 MILLIGRAM(S): at 17:59

## 2020-10-28 RX ADMIN — ISOSORBIDE DINITRATE 10 MILLIGRAM(S): 5 TABLET ORAL at 22:33

## 2020-10-28 RX ADMIN — ISOSORBIDE DINITRATE 10 MILLIGRAM(S): 5 TABLET ORAL at 07:28

## 2020-10-28 NOTE — PROGRESS NOTE ADULT - ASSESSMENT
89 yo F with PMHx depression w/ suicide attempt, rhabdomyolysis, CVA, w/ dysarthria, falls now wheel chair bound, admitted in Jan 2020 for AMS who presents for hypoxemic, hypercarbic respiratory failure 2/2 ADHF and aspiration PNA. Zosyn for asp PNA, lasix for HF, AVAPS for respiratory failure with improvement in mental status. Patient is no longer hypoxemic and acid/base status is improving, will trial off AVAPS today and tonight.     89 yo F with PMHx depression w/ suicide attempt, rhabdomyolysis, CVA, w/ dysarthria, falls now wheel chair bound, admitted in Jan 2020 for AMS who presents for hypoxemic, hypercarbic respiratory failure 2/2 ADHF and aspiration PNA. Zosyn for asp PNA, lasix for HF, AVAPS for respiratory failure with improvement in mental status. Patient is no longer hypoxemic and acid/base status is improving, will trial off AVAPS today and tonight. Patient was briefly in afib c RVR but it broke yesterday evening.

## 2020-10-28 NOTE — PROGRESS NOTE ADULT - PROBLEM SELECTOR PLAN 5
Appears to be back at baseline. DDx for encephalopathy included hyponatremia, hypercarbia, and hypoxemia

## 2020-10-28 NOTE — PROGRESS NOTE ADULT - ATTENDING COMMENTS
Patient seen and examined by me. Case discussed with resident and agree with the resident's findings and plan as documented in the resident's note. 89F h/o depression w/ suicide attempt, rhabdomyolysis, CVA, w/ dysarthria, falls now wheel chair bound, admitted in Jan 2020 for AMS who presents for hypoxemic, hypercarbic respiratory failure 2/2 ADHF and aspiration PNA c/b metabolic encephalopathy and bacteremia 2/2 Staph epidermidis (possible contaminant).    1. Hypoxemic, hypercarbic respiratory failure 2/2 ADHF and superimposed aspiration PNA +/- obesity hypoventilation:  -PNA: c/w zosyn #6/7; f/u urine legionella and sputum cultures  -acute diastolic CHF: switch to lasix 40mg PO daily; monitor I/Os; patient net negative;  echo reviewed; f/u cards recs  -per pulm re-AVAPS settings -- pulm saying it's "ok to try to take patient OFF avaps and monitor her respiratory status closely; be sure to wait >1hr after eating to put it back on and to also have it on overnights".  -ABG reviewed this am; repeat ABG with lactate in am; mild acidosis likely 2/2 to decreased perfursion (pt in rapid Afib and over diuresis)    2. HTN:  -given patient tolerating PO now, resume hydralazine 25mg PO TID and keep isordil 10mg tid if able to take    3. H/o CVA:  -improved mental status  -speech and swallow reviewed -- diet adjusted    4. Pseudo in urine:  -urine sensitive to zosyn  -on zosyn already    5. Bacteremia 2/2 Staph. Epi:  -BCx from 10/26 no growth to day    6. Hyponatremia:  -resolved    7. Metabolic encephalopathy:  -Metabolic encephalopathy like 2/2 hypercarbia, HF, hypoNa, PNA, hypoxia.   -resolved since being on AVAPS    8. Urinary incontinence:  -maintain narayan and monitor UOP for now    9. Afib:  -patient no longer in AFib  -currently on metoprolol 25mg BID; f/u with cards whether to decrease dose given bradycardia overnight    10. Dysphagia:  -speech and swallow reviewed  -per palliative care consult -- family would like inpatient hospice; f/u palliative care Patient seen and examined by me. Case discussed with resident and agree with the resident's findings and plan as documented in the resident's note. 89F h/o depression w/ suicide attempt, rhabdomyolysis, CVA, w/ dysarthria, falls now wheel chair bound, admitted in Jan 2020 for AMS who presents for hypoxemic, hypercarbic respiratory failure 2/2 ADHF and aspiration PNA c/b metabolic encephalopathy and bacteremia 2/2 Staph epidermidis (possible contaminant).    1. Hypoxemic, hypercarbic respiratory failure 2/2 ADHF and superimposed aspiration PNA +/- obesity hypoventilation:  -PNA: c/w zosyn #6/7; f/u urine legionella and sputum cultures  -acute diastolic CHF: switch to lasix 40mg PO daily; monitor I/Os; patient net negative;  echo reviewed; f/u cards recs  -per pulm re-AVAPS settings -- pulm saying it's "ok to try to take patient OFF avaps and monitor her respiratory status closely; be sure to wait >1hr after eating to put it back on and to also have it on overnights".  -ABG reviewed this am; repeat ABG with lactate in am; mild acidosis likely 2/2 to decreased perfusion (pt in rapid Afib and over diuresis)    2. HTN:  -given patient tolerating PO now, resume hydralazine 25mg PO TID and keep isordil 10mg tid if able to take    3. H/o CVA:  -improved mental status  -speech and swallow reviewed -- diet adjusted    4. Pseudo in urine:  -urine sensitive to zosyn  -on zosyn already    5. Bacteremia 2/2 Staph. Epi:  -BCx from 10/26 no growth to day    6. Hyponatremia:  -resolved    7. Metabolic encephalopathy:  -Metabolic encephalopathy like 2/2 hypercarbia, HF, hypoNa, PNA, hypoxia.   -resolved since being on AVAPS    8. Urinary incontinence:  -maintain narayan and monitor UOP for now    9. Afib:  -patient no longer in AFib  -currently on metoprolol 25mg BID; per cards, ok to continue with current dose     10. Dysphagia:  -speech and swallow reviewed  -per palliative care consult -- family would like inpatient hospice; f/u palliative care

## 2020-10-28 NOTE — PROGRESS NOTE ADULT - ASSESSMENT
89 yo F with PMHx depression w/ suicide attempt, rhabdomyolysis, CVA, w/ dysarthria, falls now wheel chair bound, admitted in Jan 2020 for AMS who presents for hypoxemic, hypercarbic respiratory failure 2/2 ADHF and aspiration PNA.     Hypoxic respiratory failure  Likely multifactorial  likely HFpEF and diastolic dysfunction  c/w lasix 40mg IV daily  c/w hydrALAZINE 25 milliGRAM(s) TID  c/w isordil 10mg TID  c/w metoprolol 25mg BID  Daily weights  monitor i/o    afib  continue with metoprolol 25mg BID  Pt. high risk for CVA given comorbidities however given hx of CVA, depression and suicide attempt would defer a/c at this time    Pt. planning on hospice.   Cardiology will sign off.  please call back with any changes in clinical status or questions      Loi Lynch  Cardiology Fellow  [phone]    All Cardiology service information can be found 24/7 on [email or web address], password: [email or web address]

## 2020-10-28 NOTE — PROGRESS NOTE ADULT - PROBLEM SELECTOR PLAN 2
pt in acute decompensated heart failure, diastolic LV dysfunction and Mitral stenosis appears hypervolemic on exam. Unclear etiology. ACS workup negative    - Strict IOs, Daily weights  - lasix 40 PO qd, goal net -1L/d (switched from IV to PO)  - Cards consulted, recs reviewed.  - TTE with diastolic dysfunction, Mitral stenosis, LA englargement, severe pulm HTN  - hydral and isordil were PO so has not been getting so was very hypertensive but patient can now take PO and pressures wnl

## 2020-10-28 NOTE — PROGRESS NOTE ADULT - SUBJECTIVE AND OBJECTIVE BOX
Patient seen and examined at bedside.    Overnight Events:   No overnight events.  pt. awaiting hospice    Current Meds:  aspirin enteric coated 81 milliGRAM(s) Oral daily  dextrose 40% Gel 15 Gram(s) Oral once PRN  dextrose 5%. 1000 milliLiter(s) IV Continuous <Continuous>  dextrose 50% Injectable 12.5 Gram(s) IV Push once  dextrose 50% Injectable 25 Gram(s) IV Push once  dextrose 50% Injectable 25 Gram(s) IV Push once  enoxaparin Injectable 40 milliGRAM(s) SubCutaneous daily  furosemide    Tablet 40 milliGRAM(s) Oral daily  glucagon  Injectable 1 milliGRAM(s) IntraMuscular once PRN  hydrALAZINE 25 milliGRAM(s) Oral every 8 hours  isosorbide   dinitrate Tablet (ISORDIL) 10 milliGRAM(s) Oral three times a day  metoprolol tartrate 25 milliGRAM(s) Oral two times a day  piperacillin/tazobactam IVPB.. 3.375 Gram(s) IV Intermittent every 8 hours  polyethylene glycol 3350 17 Gram(s) Oral two times a day        Vitals:  T(F): 97.2 (10-28), Max: 98.7 (10-27)  HR: 56 (10-28) (51 - 134)  BP: 128/60 (10-28) (100/68 - 129/69)  RR: 18 (10-28)  SpO2: 99% (10-28)  I&O's Summary    27 Oct 2020 07:01  -  28 Oct 2020 07:00  --------------------------------------------------------  IN: 100 mL / OUT: 1275 mL / NET: -1175 mL      Physical Exam:  Appearance: No acute distress; well appearing  Eyes: PERRL, EOMI, pink conjunctiva  HENT: Normal oral mucosa, blood in left nare  Cardiovascular: irregularly irregular, no murmurs, rubs, or gallops; edema to mid thigh; elevated JVD  Respiratory: coarse crackles  Gastrointestinal: soft, non-tender, non-distended with normal bowel sounds  Musculoskeletal: No clubbing; no joint deformity   Neurologic: Non-focal  Lymphatic: No lymphadenopathy  Psychiatry: AAOx3, mood & affect appropriate  Skin: No rashes, ecchymoses, or cyanosis                          13.1   6.77  )-----------( 210      ( 28 Oct 2020 06:35 )             42.0     10-28    141  |  106  |  6<L>  ----------------------------<  171<H>  4.5   |  23  |  0.72    Ca    9.4      28 Oct 2020 06:35  Phos  3.7     10-28  Mg     1.9     10-28    TPro  6.6  /  Alb  3.9  /  TBili  0.6  /  DBili  x   /  AST  22  /  ALT  17  /  AlkPhos  135<H>  10-28          Serum Pro-Brain Natriuretic Peptide: 99345 pg/mL (10-23 @ 13:30)          New ECG(s): Personally reviewed    Echo:    Stress Testing:     Cath:    Imaging:    Interpretation of Telemetry:

## 2020-10-28 NOTE — PROGRESS NOTE ADULT - PROBLEM SELECTOR PLAN 1
acute hypoxemic hypercapnic respiratory failure in the setting of volume overload and possible aspiration event.    -Patient tolerated trial off AVAPS all day yesterday but was back on o/n - will trial off for next 24 hours  - watch closely for signs of AMS   - passed swallow eval for nectar thickened foods - diet ordered

## 2020-10-28 NOTE — PROGRESS NOTE ADULT - PROBLEM SELECTOR PLAN 9
HCP, Jarred Jairo (son) @ [phone]. Per palliative GOC note plan is for hospice (inpatient preferred)  PCP Allison BYRNE form in Chart, DNR/DNI

## 2020-10-28 NOTE — PROGRESS NOTE ADULT - PROBLEM SELECTOR PLAN 3
Patient developed afib with RVR yesterday after IV insertion. Possibly related to stress of hospitalization vs LA enlargement i/s/o mitral stenosis vs overdiuresis.    -patient converted yesterday afternoon  -c/w lopressor 25 BID  -CHADSVASc 7 but agree with cardiology recommendations, will withhold AC at this time given suicide attempt, history of CVA

## 2020-10-28 NOTE — PROGRESS NOTE ADULT - ATTENDING COMMENTS
Personally saw and examined patient  Labs and vitals reviewed  Agree with above assessment and plan  pt maintaining oxygenation, breathing comfortably off non invasive ventilation  plan is for hospice, pall care following  cont care per primary team.

## 2020-10-28 NOTE — PROGRESS NOTE ADULT - SUBJECTIVE AND OBJECTIVE BOX
*INCOMPLETE NOTE*  *******************************************************  Adriel Rivera MD PGY-1  Internal Medicine  Pager ([facility]) [phone] / [phone]  *******************************************************  Patient is a 89y old  Female who presents with a chief complaint of hypoxia (27 Oct 2020 10:56)      SUBJECTIVE / OVERNIGHT EVENTS:  - Patient's afib broke somewhere between 3 and 7PM yesterday. HR back to sinus marcella, lopressor held x1.  - Patient tolerated off AVAPS well yesterday and was put back on overnight.  - Patient seen and evaluated at bedside.  - Patient endorses only feeling hungry and wanting AVAPS off.  - ROS limited by patient's baseline MS, hard of hearing, and facemask    ------------------------------------------------------------------------------------------------------------    MEDICATIONS  (STANDING):  aspirin enteric coated 81 milliGRAM(s) Oral daily  dextrose 5%. 1000 milliLiter(s) (50 mL/Hr) IV Continuous <Continuous>  dextrose 50% Injectable 12.5 Gram(s) IV Push once  dextrose 50% Injectable 25 Gram(s) IV Push once  dextrose 50% Injectable 25 Gram(s) IV Push once  enoxaparin Injectable 40 milliGRAM(s) SubCutaneous daily  furosemide    Tablet 40 milliGRAM(s) Oral daily  hydrALAZINE 25 milliGRAM(s) Oral every 8 hours  isosorbide   dinitrate Tablet (ISORDIL) 10 milliGRAM(s) Oral three times a day  metoprolol tartrate 25 milliGRAM(s) Oral two times a day  piperacillin/tazobactam IVPB.. 3.375 Gram(s) IV Intermittent every 8 hours  polyethylene glycol 3350 17 Gram(s) Oral two times a day    MEDICATIONS  (PRN):  dextrose 40% Gel 15 Gram(s) Oral once PRN Blood Glucose LESS THAN 70 milliGRAM(s)/deciLiter  glucagon  Injectable 1 milliGRAM(s) IntraMuscular once PRN Glucose <70 milliGRAM(s)/deciLiter      ------------------------------------------------------------------------------------------------------------    OBJECTIVE:    CAPILLARY BLOOD GLUCOSE  POCT Blood Glucose.: 94 mg/dL (28 Oct 2020 03:48)  POCT Blood Glucose.: 129 mg/dL (27 Oct 2020 21:27)  POCT Blood Glucose.: 125 mg/dL (27 Oct 2020 17:37)  POCT Blood Glucose.: 94 mg/dL (27 Oct 2020 12:10)    I&O's Summary    27 Oct 2020 07:01  -  28 Oct 2020 07:00  --------------------------------------------------------  IN: 100 mL / OUT: 1275 mL / NET: -1175 mL    PHYSICAL EXAM:  T(F): 97.2, Max: 98.7 (10-27-20 @ 10:58)  HR: 59 (51 - 134)  BP: 128/60 (100/68 - 129/69)  RR: 18 (18 - 18)  SpO2: 98% (95% - 99%)      PHYSICAL EXAM  GENERAL: obese, moderately ill appearing, on AVAPS, NAD   HEAD:  Atraumatic, normocephalic  EYES: EMOI, sclera clear  CHEST/LUNG: difficult to appreciate due to habitus but scattered rales upper lung fields b/l; no wheeze  HEART: RRR; S1, S2; no M/R/G  ABDOMEN: soft, non-distended; non-tender; BS present  EXTREMITIES:  1+ pitting edema upper extremities b/l, mild tenderness; peripheral pulses intact;   NEURO: non-focal  PSYCH: not assessed  SKIN: proximal extremities warm, no rashes or lesions    ------------------------------------------------------------------------------------------------------------  LABS:                        12.7   6.11  )-----------( 190      ( 27 Oct 2020 09:30 )             40.2     10-27    134<L>  |  86<L>  |  11  ----------------------------<  96  3.4<L>   |  40<H>  |  0.63    Ca    8.6      27 Oct 2020 09:30  Phos  2.9     10-27  Mg     1.6     10-27    TPro  6.4  /  Alb  3.1<L>  /  TBili  0.8  /  DBili  x   /  AST  21  /  ALT  15  /  AlkPhos  88  10-26              Culture - Blood (collected 26 Oct 2020 14:00)  Source: .Blood Blood-Venous  Preliminary Report (27 Oct 2020 14:01):    No growth to date.    Culture - Blood (collected 26 Oct 2020 13:57)  Source: .Blood Blood-Peripheral  Preliminary Report (27 Oct 2020 14:01):    No growth to date.        RADIOLOGY & ADDITIONAL TESTS:  Results Reviewed:     Imaging Personally Reviewed:  Electrocardiogram Personally Reviewed:      COORDINATION OF CARE:  Care discussed with consultants/other providers and notes reviewed [Y]/[N]:   Prior or Outpatient Records Reviewed [Y]/[N]:   ------------------------------------------------------------------------------------------------------------

## 2020-10-28 NOTE — PROGRESS NOTE ADULT - PROBLEM SELECTOR PLAN 4
repeat pt w/ hx dysarthria, CAP, now w/ focal consolidations in RML RLL  - f/u sputum cx  - f/u urine legionella  - c/w zosyn (10/23 - ), day 6/7  - s/p azithro (10/23-10/24) repeat repeat repeat

## 2020-10-29 DIAGNOSIS — N17.9 ACUTE KIDNEY FAILURE, UNSPECIFIED: ICD-10-CM

## 2020-10-29 LAB
ALBUMIN SERPL ELPH-MCNC: 3.4 G/DL — SIGNIFICANT CHANGE UP (ref 3.3–5)
ALP SERPL-CCNC: 70 U/L — SIGNIFICANT CHANGE UP (ref 40–120)
ALT FLD-CCNC: 15 U/L — SIGNIFICANT CHANGE UP (ref 4–33)
ANION GAP SERPL CALC-SCNC: 12 MMO/L — SIGNIFICANT CHANGE UP (ref 7–14)
AST SERPL-CCNC: 36 U/L — HIGH (ref 4–32)
BILIRUB SERPL-MCNC: 0.7 MG/DL — SIGNIFICANT CHANGE UP (ref 0.2–1.2)
BUN SERPL-MCNC: 23 MG/DL — SIGNIFICANT CHANGE UP (ref 7–23)
CALCIUM SERPL-MCNC: 9 MG/DL — SIGNIFICANT CHANGE UP (ref 8.4–10.5)
CHLORIDE SERPL-SCNC: 86 MMOL/L — LOW (ref 98–107)
CO2 SERPL-SCNC: 33 MMOL/L — HIGH (ref 22–31)
CREAT SERPL-MCNC: 1.34 MG/DL — HIGH (ref 0.5–1.3)
GLUCOSE BLDC GLUCOMTR-MCNC: 102 MG/DL — HIGH (ref 70–99)
GLUCOSE BLDC GLUCOMTR-MCNC: 106 MG/DL — HIGH (ref 70–99)
GLUCOSE BLDC GLUCOMTR-MCNC: 115 MG/DL — HIGH (ref 70–99)
GLUCOSE BLDC GLUCOMTR-MCNC: 117 MG/DL — HIGH (ref 70–99)
GLUCOSE SERPL-MCNC: 93 MG/DL — SIGNIFICANT CHANGE UP (ref 70–99)
HCT VFR BLD CALC: 35.7 % — SIGNIFICANT CHANGE UP (ref 34.5–45)
HGB BLD-MCNC: 11.1 G/DL — LOW (ref 11.5–15.5)
MAGNESIUM SERPL-MCNC: 2.1 MG/DL — SIGNIFICANT CHANGE UP (ref 1.6–2.6)
MCHC RBC-ENTMCNC: 28 PG — SIGNIFICANT CHANGE UP (ref 27–34)
MCHC RBC-ENTMCNC: 31.1 % — LOW (ref 32–36)
MCV RBC AUTO: 90.2 FL — SIGNIFICANT CHANGE UP (ref 80–100)
NRBC # FLD: 0 K/UL — SIGNIFICANT CHANGE UP (ref 0–0)
PHOSPHATE SERPL-MCNC: 3.9 MG/DL — SIGNIFICANT CHANGE UP (ref 2.5–4.5)
PLATELET # BLD AUTO: 153 K/UL — SIGNIFICANT CHANGE UP (ref 150–400)
PMV BLD: 10 FL — SIGNIFICANT CHANGE UP (ref 7–13)
POTASSIUM SERPL-MCNC: 4.3 MMOL/L — SIGNIFICANT CHANGE UP (ref 3.5–5.3)
POTASSIUM SERPL-SCNC: 4.3 MMOL/L — SIGNIFICANT CHANGE UP (ref 3.5–5.3)
PROT SERPL-MCNC: 6.5 G/DL — SIGNIFICANT CHANGE UP (ref 6–8.3)
RBC # BLD: 3.96 M/UL — SIGNIFICANT CHANGE UP (ref 3.8–5.2)
RBC # FLD: 14.5 % — SIGNIFICANT CHANGE UP (ref 10.3–14.5)
SODIUM SERPL-SCNC: 131 MMOL/L — LOW (ref 135–145)
WBC # BLD: 9.76 K/UL — SIGNIFICANT CHANGE UP (ref 3.8–10.5)
WBC # FLD AUTO: 9.76 K/UL — SIGNIFICANT CHANGE UP (ref 3.8–10.5)

## 2020-10-29 PROCEDURE — 99358 PROLONG SERVICE W/O CONTACT: CPT

## 2020-10-29 PROCEDURE — 99233 SBSQ HOSP IP/OBS HIGH 50: CPT | Mod: GC

## 2020-10-29 PROCEDURE — 99233 SBSQ HOSP IP/OBS HIGH 50: CPT

## 2020-10-29 RX ADMIN — Medication 25 MILLIGRAM(S): at 22:48

## 2020-10-29 RX ADMIN — Medication 25 MILLIGRAM(S): at 13:20

## 2020-10-29 RX ADMIN — ISOSORBIDE DINITRATE 10 MILLIGRAM(S): 5 TABLET ORAL at 06:30

## 2020-10-29 RX ADMIN — ISOSORBIDE DINITRATE 10 MILLIGRAM(S): 5 TABLET ORAL at 13:24

## 2020-10-29 RX ADMIN — ISOSORBIDE DINITRATE 10 MILLIGRAM(S): 5 TABLET ORAL at 22:48

## 2020-10-29 RX ADMIN — Medication 40 MILLIGRAM(S): at 06:30

## 2020-10-29 RX ADMIN — Medication 81 MILLIGRAM(S): at 13:20

## 2020-10-29 RX ADMIN — Medication 25 MILLIGRAM(S): at 06:30

## 2020-10-29 RX ADMIN — Medication 25 MILLIGRAM(S): at 18:25

## 2020-10-29 RX ADMIN — POLYETHYLENE GLYCOL 3350 17 GRAM(S): 17 POWDER, FOR SOLUTION ORAL at 18:25

## 2020-10-29 RX ADMIN — PIPERACILLIN AND TAZOBACTAM 25 GRAM(S): 4; .5 INJECTION, POWDER, LYOPHILIZED, FOR SOLUTION INTRAVENOUS at 09:43

## 2020-10-29 RX ADMIN — PIPERACILLIN AND TAZOBACTAM 25 GRAM(S): 4; .5 INJECTION, POWDER, LYOPHILIZED, FOR SOLUTION INTRAVENOUS at 00:25

## 2020-10-29 RX ADMIN — PIPERACILLIN AND TAZOBACTAM 25 GRAM(S): 4; .5 INJECTION, POWDER, LYOPHILIZED, FOR SOLUTION INTRAVENOUS at 18:25

## 2020-10-29 RX ADMIN — ENOXAPARIN SODIUM 40 MILLIGRAM(S): 100 INJECTION SUBCUTANEOUS at 13:20

## 2020-10-29 NOTE — PROGRESS NOTE ADULT - PROBLEM SELECTOR PLAN 6
Thank you for allowing us to participate in your patient's care. We will continue to follow with you. Please page [phone] or contact us via Microsoft Teams for any q's or c's.     Gagan Lara  Palliative Medicine

## 2020-10-29 NOTE — PROGRESS NOTE ADULT - PROBLEM SELECTOR PROBLEM 2
Acute heart failure, unspecified heart failure type MOISES (acute kidney injury) Acute respiratory failure with hypoxia and hypercapnia

## 2020-10-29 NOTE — PROGRESS NOTE ADULT - ASSESSMENT
89 yo F with PMHx depression w/ hx suicide attempt, rhabdomyolysis, CVA c/b dysarthria, hx falls now wheel chair bound, and pAF dx'd this admission who presents for hypoxemic, hypercarbic respiratory failure 2/2 ADHF and aspiration PNA. Zosyn for asp PNA, lasix for HF, AVAPS for respiratory failure with improvement in mental status. Patient is no longer hypoxemic and acid/base status is improving, will trial off AVAPS today and tonight.   89F w/ depression w/ hx suicide attempt, rhabdomyolysis, CVA c/b dysarthria, hx falls now wheelchair bound, and pAF dx'd this admission who p/w hypoxemic, hypercarbic respiratory failure 2/2 ADHF (being diuresed w/ lasix, started on hyrdral/isordil) and aspiration PNA, being treated w/ zosyn. Mental status remains improved off AVAPS. Patient is no longer hypoxemic and acid/base status is improving. Now w/ MOISES and metabolic alkalosis suspected 2/2 overdiuresis, holding lasix.   89F w/ depression w/ hx suicide attempt, rhabdomyolysis, CVA c/b dysarthria, hx falls now wheelchair bound, and pAF dx'd this admission who p/w acute metabolic encephalopathy and hypoxemic, hypercarbic respiratory failure 2/2 ADHF (being diuresed w/ lasix, started on hyrdral/isordil) and aspiration PNA, pansensitive pseudomonas UTI, being treated w/ zosyn. Mental status remains improved off AVAPS. Patient is no longer hypoxemic and acid/base status is improving. Now w/ MOISES and metabolic alkalosis suspected 2/2 overdiuresis, holding lasix.

## 2020-10-29 NOTE — PROGRESS NOTE ADULT - PROBLEM SELECTOR PLAN 2
pt in acute decompensated heart failure, diastolic LV dysfunction and Mitral stenosis appears hypervolemic on exam. Unclear etiology. ACS workup negative    - Strict IOs, Daily weights  - lasix 40 PO qd, goal net -1L/d (switched from IV to PO)  - Cards consulted, recs reviewed.  - TTE with diastolic dysfunction, Mitral stenosis, LA englargement, severe pulm HTN  - hydral and isordil were PO so has not been getting so was very hypertensive but patient can now take PO and pressures wnl Cr 1.34 <- 0.72 w/ hyponatremia, hypochloremia and increased bicarb, suspected 2/2 metabolic alkalosis from acute hypoxemic hypercapnic respiratory failure in the setting of volume overload and suspected aspiration event.    -Patient tolerated trial off AVAPS all day yesterday and night  - will continue to trial off   - watch closely for signs of AMS - return to AVAPs if lethargic  - passed swallow eval for nectar thickened foods - diet ordered

## 2020-10-29 NOTE — PROGRESS NOTE ADULT - PROBLEM SELECTOR PLAN 8
DVT: LVX    Diet: dysphagia 1 with nectar thickened liquids  Fall, aspiration precautions HOB 45 - ucx with pseudomonas sensitive to zosyn  - TOV  - Bowel regimen

## 2020-10-29 NOTE — PROGRESS NOTE ADULT - PROBLEM SELECTOR PLAN 6
- hx CVA   - cw ASA Appears to be back at baseline. DDx for encephalopathy included hyponatremia, hypercarbia, and hypoxemia

## 2020-10-29 NOTE — PROGRESS NOTE ADULT - PROBLEM SELECTOR PLAN 9
HCP, Jarred Jairo (son) @ [phone]. Per palliative GOC note plan is for hospice (inpatient preferred)  PCP Allison BYRNE form in Chart, DNR/DNI DVT: LVX    Diet: dysphagia 1 with nectar thickened liquids  Fall, aspiration precautions HOB 45

## 2020-10-29 NOTE — PROGRESS NOTE ADULT - PROBLEM SELECTOR PLAN 1
acute hypoxemic hypercapnic respiratory failure in the setting of volume overload and possible aspiration event.    -Patient tolerated trial off AVAPS all day yesterday but was back on o/n - will trial off for next 24 hours  - watch closely for signs of AMS   - passed swallow eval for nectar thickened foods - diet ordered acute hypoxemic hypercapnic respiratory failure in the setting of volume overload and suspected aspiration event.    -Patient tolerated trial off AVAPS all day yesterday and night  - will continue to trial off   - watch closely for signs of AMS - stat ABG, AVAPs if lethargic  - passed swallow eval for nectar thickened foods - diet ordered Cr 1.34 <- 0.72 w/ hyponatremia, hypochloremia and increased bicarb, elevated lactate suspected 2/2 contraction alkalosis from overdiuresis  -will hold lasix 40mg PO tmrw; resume at 20mg qd on 10/31 if MOISES improves  -monitor UOP  -monitor lactate (unable to be collected w/ AM labs today, will repeat tmrw AM for pt comfort; if worsening, consider gentle IVF repletion

## 2020-10-29 NOTE — PROGRESS NOTE ADULT - ASSESSMENT
89 yo F with PMHx depression w/ suicide attempt, rhabdomyolysis, CVA, w/ dysarthria, falls now wheel chair bound, admitted in Jan 2020 for AMS, CAP,  UTI, currently admitted for respiratory failure and AMS. Palliative Medicine was consulted for complex medical decision making related to goals of care discussions

## 2020-10-29 NOTE — PROGRESS NOTE ADULT - ATTENDING COMMENTS
Patient seen and examined by me. Case discussed with resident and agree with the resident's findings and plan as documented in the resident's note. 89F h/o depression w/ suicide attempt, rhabdomyolysis, CVA, w/ dysarthria, falls now wheel chair bound, admitted in Jan 2020 for AMS who presents for hypoxemic, hypercarbic respiratory failure 2/2 ADHF and aspiration PNA c/b metabolic encephalopathy and bacteremia 2/2 Staph epidermidis (possible contaminant).    1. Hypoxemic, hypercarbic respiratory failure 2/2 ADHF and superimposed aspiration PNA +/- obesity hypoventilation:  -PNA: c/w zosyn #6/7; f/u urine legionella and sputum cultures; c/w aspiration precautions  -acute diastolic CHF: currently lasix 40mg PO daily; patient likely overdiuresed -- will hold lasix tomorrow and consider starting at a lower dose; monitor I/Os; echo reviewed; f/u cards recs  -patient doing well during the day off the AVAPs; will try to wean off at night  -will repeat VBG with lactate in am    2. HTN:  -given patient tolerating PO now, c/w hydralazine 25mg PO TID and keep isordil 10mg tid if able to take    3. H/o CVA:  -improved mental status  -speech and swallow reviewed -- diet adjusted    4. Pseudo in urine:  -urine sensitive to zosyn  -on zosyn already    5. Bacteremia 2/2 Staph. Epi:  -BCx from 10/26 no growth to day    6. Hyponatremia:  -resolved    7. Metabolic encephalopathy:  -Metabolic encephalopathy like 2/2 hypercarbia, HF, hypoNa, PNA, hypoxia.   -resolved since being on AVAPS    8. Urinary incontinence:  -passed TOV  -c/w primafit    9. Afib:  -patient no longer in AFib  -currently on metoprolol 25mg BID; per cards, ok to continue with current dose     10. Dysphagia:  -speech and swallow reviewed  -per palliative care consult -- family would like inpatient hospice; f/u palliative care and discussion with son who prefers to privately pay for inpatient hospice Patient seen and examined by me. Case discussed with resident and agree with the resident's findings and plan as documented in the resident's note. 89F h/o depression w/ suicide attempt, rhabdomyolysis, CVA, w/ dysarthria, falls now wheel chair bound, admitted in Jan 2020 for AMS who presents for hypoxemic, hypercarbic respiratory failure 2/2 ADHF and aspiration PNA c/b metabolic encephalopathy and bacteremia 2/2 Staph epidermidis (possible contaminant).    1. Hypoxemic, hypercarbic respiratory failure 2/2 ADHF and superimposed aspiration PNA +/- obesity hypoventilation:  -PNA: c/w zosyn #6/7; f/u urine legionella and sputum cultures; c/w aspiration precautions  -acute diastolic CHF: currently lasix 40mg PO daily; patient likely overdiuresed -- will hold lasix tomorrow and consider starting at a lower dose; monitor I/Os; echo reviewed; f/u cards recs  -patient doing well during the day and night off the AVAPs; just on oxygen  -will repeat VBG with lactate in am    2. HTN:  -given patient tolerating PO now, c/w hydralazine 25mg PO TID and keep isordil 10mg tid if able to take    3. H/o CVA:  -improved mental status  -speech and swallow reviewed -- diet adjusted    4. Pseudo in urine:  -urine sensitive to zosyn  -on zosyn already    5. Bacteremia 2/2 Staph. Epi:  -BCx from 10/26 no growth to day    6. Hyponatremia:  -resolved    7. Metabolic encephalopathy:  -Metabolic encephalopathy like 2/2 hypercarbia, HF, hypoNa, PNA, hypoxia.   -resolved since being on AVAPS    8. Urinary incontinence:  -passed TOV  -c/w primafit    9. Afib:  -patient no longer in AFib  -currently on metoprolol 25mg BID; per cards, ok to continue with current dose     10. Dysphagia:  -speech and swallow reviewed  -per palliative care consult -- family would like inpatient hospice; f/u palliative care and discussion with son who prefers to privately pay for inpatient hospice but will discuss with son if agreeable to taking patient home with hospice

## 2020-10-29 NOTE — PROGRESS NOTE ADULT - PROBLEM SELECTOR PROBLEM 5
Metabolic encephalopathy Aspiration pneumonia due to regurgitated food, unspecified laterality, unspecified part of lung

## 2020-10-29 NOTE — PROGRESS NOTE ADULT - PROBLEM SELECTOR PLAN 10
HCP, Jarred Jairo (son) @ [phone]. Per palliative GOC note plan is for hospice (inpatient preferred)  PCP Allison BYRNE form in Chart, DNR/DNI

## 2020-10-29 NOTE — PROGRESS NOTE ADULT - SUBJECTIVE AND OBJECTIVE BOX
***************************************************************  Efraín Renteria PGY3  Internal Medicine   Pager: [phone]  [facility] in house pager: [phone]    Mon-Fri: pager covered by day team 7am-7pm;   *Academic conferences 12pm-1pm  Sa/Sun: see chart, primary physician assigned available 7am-12pm  Sat/Hinojosa Cross Coverage 12pm-7pm: NS- page [phone] for Team1-4, [facility] - pager forwarded to covering Resident (pager Resident listed in "Resident Assignment" tab)  For Night coverage 7pm-7am: NS- page [phone] for Teams 1278; page [phone] for Teams 3457;   [facility]- page [phone] for Teams 1278; [phone] for Teams 3458  ***************************************************************    SUNG SANCHEZ  89y  MRN: [id number]    Patient is a 89y old  Female who presents with a chief complaint of hypoxia (28 Oct 2020 10:48)      INCOMPLETE NOTE    Subjective: no events ON. Was off AVAPS overnight. No episodes of reported lethargy. Pt is pleasant this AM, again asking for food, specifically oatmeal. Had a BM last night.  No complaints this AM.   Tele: NSR 60s, 02 sat >94 on 2L NC      MEDICATIONS  (STANDING):  aspirin enteric coated 81 milliGRAM(s) Oral daily  dextrose 5%. 1000 milliLiter(s) (50 mL/Hr) IV Continuous <Continuous>  dextrose 50% Injectable 12.5 Gram(s) IV Push once  dextrose 50% Injectable 25 Gram(s) IV Push once  dextrose 50% Injectable 25 Gram(s) IV Push once  enoxaparin Injectable 40 milliGRAM(s) SubCutaneous daily  furosemide    Tablet 40 milliGRAM(s) Oral daily  hydrALAZINE 25 milliGRAM(s) Oral every 8 hours  isosorbide   dinitrate Tablet (ISORDIL) 10 milliGRAM(s) Oral three times a day  metoprolol tartrate 25 milliGRAM(s) Oral two times a day  piperacillin/tazobactam IVPB.. 3.375 Gram(s) IV Intermittent every 8 hours  polyethylene glycol 3350 17 Gram(s) Oral two times a day    MEDICATIONS  (PRN):  dextrose 40% Gel 15 Gram(s) Oral once PRN Blood Glucose LESS THAN 70 milliGRAM(s)/deciLiter  glucagon  Injectable 1 milliGRAM(s) IntraMuscular once PRN Glucose <70 milliGRAM(s)/deciLiter      Objective:    Vitals: Vital Signs Last 24 Hrs  T(C): 36.3 (10-29-20 @ 06:27), Max: 36.4 (10-28-20 @ 14:52)  T(F): 97.4 (10-29-20 @ 06:27), Max: 97.6 (10-28-20 @ 17:57)  HR: 57 (10-29-20 @ 07:37) (49 - 63)  BP: 136/65 (10-29-20 @ 06:27) (117/59 - 141/66)  BP(mean): 81 (10-29-20 @ 06:27) (81 - 81)  RR: 17 (10-29-20 @ 06:27) (17 - 18)  SpO2: 97% (10-29-20 @ 07:37) (96% - 99%)              I&O's Summary    28 Oct 2020 07:01  -  29 Oct 2020 07:00  --------------------------------------------------------  IN: 225 mL / OUT: 0 mL / NET: 225 mL        PHYSICAL EXAM:  GENERAL: obese elderly female, moderately ill appearing, pleasant, on 2L NC, in NAD  HEAD:  Atraumatic, normocephalic  EYES: EMOI, sclera clear  CHEST/LUNG: difficult to appreciate due to habitus but scattered rales upper lung fields b/l; no wheeze  HEART: RRR; S1, S2; no M/R/G  ABDOMEN: soft, non-distended; non-tender; BS present  EXTREMITIES:  1+ pitting edema R hand and no edema in LUE, peripheral pulses intact; thin lower extremities w/ NO edema  NEURO: non-focal  PSYCH: not assessed  SKIN: proximal extremities warm, no rashes or lesions    LABS:                        11.1   9.76  )-----------( 153      ( 29 Oct 2020 06:00 )             35.7                         13.1   6.77  )-----------( 210      ( 28 Oct 2020 06:35 )             42.0                         12.7   6.11  )-----------( 190      ( 27 Oct 2020 09:30 )             40.2     10-29    131<L>  |  86<L>  |  23  ----------------------------<  93  4.3   |  33<H>  |  1.34<H>  10-28    141  |  106  |  6<L>  ----------------------------<  171<H>  4.5   |  23  |  0.72  10-27    134<L>  |  86<L>  |  11  ----------------------------<  96  3.4<L>   |  40<H>  |  0.63    Ca    9.0      29 Oct 2020 06:00  Ca    9.4      28 Oct 2020 06:35  Ca    8.6      27 Oct 2020 09:30  Phos  3.9     10-29  Mg     2.1     10-29    TPro  6.5  /  Alb  3.4  /  TBili  0.7  /  DBili  x   /  AST  36<H>  /  ALT  15  /  AlkPhos  70  10-29  TPro  6.6  /  Alb  3.9  /  TBili  0.6  /  DBili  x   /  AST  22  /  ALT  17  /  AlkPhos  135<H>  10-28                      CAPILLARY BLOOD GLUCOSE      POCT Blood Glucose.: 102 mg/dL (29 Oct 2020 06:11)  POCT Blood Glucose.: 105 mg/dL (28 Oct 2020 23:46)  POCT Blood Glucose.: 148 mg/dL (28 Oct 2020 17:29)  POCT Blood Glucose.: 118 mg/dL (28 Oct 2020 12:35)  POCT Blood Glucose.: 106 mg/dL (28 Oct 2020 08:43)    RADIOLOGY & ADDITIONAL TESTS:  Imaging Personally Reviewed:  [x ] YES  [ ] NO  Consultants involved in case:   Consultant(s) Notes Reviewed:  [ x] YES  [ ] NO:   Care Discussed with Consultants/Other Providers [x ] YES  [ ] NO         ***************************************************************  Efraín Renteria PGY3  Internal Medicine   Pager: [phone]  [facility] in house pager: [phone]    Mon-Fri: pager covered by day team 7am-7pm;   *Academic conferences 12pm-1pm  Sa/Sun: see chart, primary physician assigned available 7am-12pm  Sat/Hinojosa Cross Coverage 12pm-7pm: NS- page [phone] for Team1-4, [facility] - pager forwarded to covering Resident (pager Resident listed in "Resident Assignment" tab)  For Night coverage 7pm-7am: NS- page [phone] for Teams 1278; page [phone] for Teams 3457;   [facility]- page [phone] for Teams 1278; [phone] for Teams 3458  ***************************************************************    SUNG SANCHEZ  89y  MRN: [id number]    Patient is a 89y old  Female who presents with a chief complaint of hypoxia (28 Oct 2020 10:48)      INCOMPLETE NOTE    Subjective: no events ON. Was off AVAPS overnight. No episodes of reported lethargy. Pt is pleasant this AM, again asking for food, specifically oatmeal. Had a BM last night. Per RN, primafit suctioning was not functioning properly last night but had soaked through 2 pads w/ urine (recorded UOP innacurate). No complaints this AM.   Tele: NSR 60s, 02 sat >94 on 2L NC      MEDICATIONS  (STANDING):  aspirin enteric coated 81 milliGRAM(s) Oral daily  dextrose 5%. 1000 milliLiter(s) (50 mL/Hr) IV Continuous <Continuous>  dextrose 50% Injectable 12.5 Gram(s) IV Push once  dextrose 50% Injectable 25 Gram(s) IV Push once  dextrose 50% Injectable 25 Gram(s) IV Push once  enoxaparin Injectable 40 milliGRAM(s) SubCutaneous daily  furosemide    Tablet 40 milliGRAM(s) Oral daily  hydrALAZINE 25 milliGRAM(s) Oral every 8 hours  isosorbide   dinitrate Tablet (ISORDIL) 10 milliGRAM(s) Oral three times a day  metoprolol tartrate 25 milliGRAM(s) Oral two times a day  piperacillin/tazobactam IVPB.. 3.375 Gram(s) IV Intermittent every 8 hours  polyethylene glycol 3350 17 Gram(s) Oral two times a day    MEDICATIONS  (PRN):  dextrose 40% Gel 15 Gram(s) Oral once PRN Blood Glucose LESS THAN 70 milliGRAM(s)/deciLiter  glucagon  Injectable 1 milliGRAM(s) IntraMuscular once PRN Glucose <70 milliGRAM(s)/deciLiter      Objective:    Vitals: Vital Signs Last 24 Hrs  T(C): 36.3 (10-29-20 @ 06:27), Max: 36.4 (10-28-20 @ 14:52)  T(F): 97.4 (10-29-20 @ 06:27), Max: 97.6 (10-28-20 @ 17:57)  HR: 57 (10-29-20 @ 07:37) (49 - 63)  BP: 136/65 (10-29-20 @ 06:27) (117/59 - 141/66)  BP(mean): 81 (10-29-20 @ 06:27) (81 - 81)  RR: 17 (10-29-20 @ 06:27) (17 - 18)  SpO2: 97% (10-29-20 @ 07:37) (96% - 99%)              I&O's Summary    28 Oct 2020 07:01  -  29 Oct 2020 07:00  --------------------------------------------------------  IN: 225 mL / OUT: 0 mL / NET: 225 mL        PHYSICAL EXAM:  GENERAL: obese elderly female, moderately ill appearing, pleasant, on 2L NC, in NAD  HEAD:  Atraumatic, normocephalic  EYES: EMOI, sclera clear  CHEST/LUNG: difficult to appreciate due to habitus but scattered rales upper lung fields b/l; no wheeze  HEART: RRR; S1, S2; no M/R/G  ABDOMEN: soft, non-distended; non-tender; BS present  EXTREMITIES:  1+ pitting edema R hand and no edema in LUE, peripheral pulses intact; thin lower extremities w/ NO edema  NEURO: non-focal  PSYCH: not assessed  SKIN: proximal extremities warm, no rashes or lesions    LABS:                        11.1   9.76  )-----------( 153      ( 29 Oct 2020 06:00 )             35.7                         13.1   6.77  )-----------( 210      ( 28 Oct 2020 06:35 )             42.0                         12.7   6.11  )-----------( 190      ( 27 Oct 2020 09:30 )             40.2     10-29    131<L>  |  86<L>  |  23  ----------------------------<  93  4.3   |  33<H>  |  1.34<H>  10-28    141  |  106  |  6<L>  ----------------------------<  171<H>  4.5   |  23  |  0.72  10-27    134<L>  |  86<L>  |  11  ----------------------------<  96  3.4<L>   |  40<H>  |  0.63    Ca    9.0      29 Oct 2020 06:00  Ca    9.4      28 Oct 2020 06:35  Ca    8.6      27 Oct 2020 09:30  Phos  3.9     10-29  Mg     2.1     10-29    TPro  6.5  /  Alb  3.4  /  TBili  0.7  /  DBili  x   /  AST  36<H>  /  ALT  15  /  AlkPhos  70  10-29  TPro  6.6  /  Alb  3.9  /  TBili  0.6  /  DBili  x   /  AST  22  /  ALT  17  /  AlkPhos  135<H>  10-28                      CAPILLARY BLOOD GLUCOSE      POCT Blood Glucose.: 102 mg/dL (29 Oct 2020 06:11)  POCT Blood Glucose.: 105 mg/dL (28 Oct 2020 23:46)  POCT Blood Glucose.: 148 mg/dL (28 Oct 2020 17:29)  POCT Blood Glucose.: 118 mg/dL (28 Oct 2020 12:35)  POCT Blood Glucose.: 106 mg/dL (28 Oct 2020 08:43)    RADIOLOGY & ADDITIONAL TESTS:  Imaging Personally Reviewed:  [x ] YES  [ ] NO  Consultants involved in case:   Consultant(s) Notes Reviewed:  [ x] YES  [ ] NO:   Care Discussed with Consultants/Other Providers [x ] YES  [ ] NO         ***************************************************************  Efraín Renteria PGY3  Internal Medicine   Pager: [phone]  [facility] in house pager: [phone]    Mon-Fri: pager covered by day team 7am-7pm;   *Academic conferences 12pm-1pm  Sa/Sun: see chart, primary physician assigned available 7am-12pm  Sat/Hinojosa Cross Coverage 12pm-7pm: NS- page [phone] for Team1-4, [facility] - pager forwarded to covering Resident (pager Resident listed in "Resident Assignment" tab)  For Night coverage 7pm-7am: NS- page [phone] for Teams 1278; page [phone] for Teams 3457;   [facility]- page [phone] for Teams 1278; [phone] for Teams 3458  ***************************************************************    SUNG SANCHEZ  89y  MRN: [id number]    Patient is a 89y old  Female who presents with a chief complaint of hypoxia (28 Oct 2020 10:48)      INCOMPLETE NOTE    Subjective: no events ON. Was off AVAPS overnight. No episodes of reported lethargy. Pt is pleasant this AM, again asking for food, specifically oatmeal. Had a BM last night. Per RN, primafit suctioning was not functioning properly last night but had soaked through 2 pads w/ urine (recorded UOP innacurate). No complaints this AM.   Tele: NSR 60s, 02 sat >94 on 2L NC      MEDICATIONS  (STANDING):  aspirin enteric coated 81 milliGRAM(s) Oral daily  dextrose 5%. 1000 milliLiter(s) (50 mL/Hr) IV Continuous <Continuous>  dextrose 50% Injectable 12.5 Gram(s) IV Push once  dextrose 50% Injectable 25 Gram(s) IV Push once  dextrose 50% Injectable 25 Gram(s) IV Push once  enoxaparin Injectable 40 milliGRAM(s) SubCutaneous daily  furosemide    Tablet 40 milliGRAM(s) Oral daily  hydrALAZINE 25 milliGRAM(s) Oral every 8 hours  isosorbide   dinitrate Tablet (ISORDIL) 10 milliGRAM(s) Oral three times a day  metoprolol tartrate 25 milliGRAM(s) Oral two times a day  piperacillin/tazobactam IVPB.. 3.375 Gram(s) IV Intermittent every 8 hours  polyethylene glycol 3350 17 Gram(s) Oral two times a day    MEDICATIONS  (PRN):  dextrose 40% Gel 15 Gram(s) Oral once PRN Blood Glucose LESS THAN 70 milliGRAM(s)/deciLiter  glucagon  Injectable 1 milliGRAM(s) IntraMuscular once PRN Glucose <70 milliGRAM(s)/deciLiter      Objective:    Vitals: Vital Signs Last 24 Hrs  T(C): 36.3 (10-29-20 @ 06:27), Max: 36.4 (10-28-20 @ 14:52)  T(F): 97.4 (10-29-20 @ 06:27), Max: 97.6 (10-28-20 @ 17:57)  HR: 57 (10-29-20 @ 07:37) (49 - 63)  BP: 136/65 (10-29-20 @ 06:27) (117/59 - 141/66)  BP(mean): 81 (10-29-20 @ 06:27) (81 - 81)  RR: 17 (10-29-20 @ 06:27) (17 - 18)  SpO2: 97% (10-29-20 @ 07:37) (96% - 99%)              I&O's Summary    28 Oct 2020 07:01  -  29 Oct 2020 07:00  --------------------------------------------------------  IN: 225 mL / OUT: 0 mL / NET: 225 mL      PHYSICAL EXAM:  GENERAL: obese elderly female, moderately ill appearing, pleasant, on 2L NC, in NAD  HEAD:  Atraumatic, normocephalic  EYES: EMOI, sclera clear  CHEST/LUNG: difficult to appreciate due to habitus but scattered rales upper lung fields b/l; no wheeze  HEART: RRR; S1, S2; no M/R/G  ABDOMEN: soft, non-distended; non-tender; BS present  EXTREMITIES:  1+ pitting edema R hand and no edema in LUE, peripheral pulses intact; thin lower extremities w/ NO edema  NEURO: non-focal  PSYCH: not assessed  SKIN: proximal extremities warm, no rashes or lesions    LABS:                        11.1   9.76  )-----------( 153      ( 29 Oct 2020 06:00 )             35.7                         13.1   6.77  )-----------( 210      ( 28 Oct 2020 06:35 )             42.0                         12.7   6.11  )-----------( 190      ( 27 Oct 2020 09:30 )             40.2     10-29    131<L>  |  86<L>  |  23  ----------------------------<  93  4.3   |  33<H>  |  1.34<H>  10-28    141  |  106  |  6<L>  ----------------------------<  171<H>  4.5   |  23  |  0.72  10-27    134<L>  |  86<L>  |  11  ----------------------------<  96  3.4<L>   |  40<H>  |  0.63    Ca    9.0      29 Oct 2020 06:00  Ca    9.4      28 Oct 2020 06:35  Ca    8.6      27 Oct 2020 09:30  Phos  3.9     10-29  Mg     2.1     10-29    TPro  6.5  /  Alb  3.4  /  TBili  0.7  /  DBili  x   /  AST  36<H>  /  ALT  15  /  AlkPhos  70  10-29  TPro  6.6  /  Alb  3.9  /  TBili  0.6  /  DBili  x   /  AST  22  /  ALT  17  /  AlkPhos  135<H>  10-28                      CAPILLARY BLOOD GLUCOSE      POCT Blood Glucose.: 102 mg/dL (29 Oct 2020 06:11)  POCT Blood Glucose.: 105 mg/dL (28 Oct 2020 23:46)  POCT Blood Glucose.: 148 mg/dL (28 Oct 2020 17:29)  POCT Blood Glucose.: 118 mg/dL (28 Oct 2020 12:35)  POCT Blood Glucose.: 106 mg/dL (28 Oct 2020 08:43)    RADIOLOGY & ADDITIONAL TESTS:  Imaging Personally Reviewed:  [x ] YES  [ ] NO  Consultants involved in case:   Consultant(s) Notes Reviewed:  [ x] YES  [ ] NO:   Care Discussed with Consultants/Other Providers [x ] YES  [ ] NO

## 2020-10-29 NOTE — PROGRESS NOTE ADULT - PROBLEM SELECTOR PLAN 3
Patient developed afib with RVR yesterday after IV insertion. Possibly related to stress of hospitalization vs LA enlargement i/s/o mitral stenosis vs overdiuresis.    -patient converted yesterday afternoon  -c/w lopressor 25 BID  -CHADSVASc 7 but agree with cardiology recommendations, will withhold AC at this time given suicide attempt, history of CVA pt in acute decompensated heart failure, diastolic LV dysfunction and Mitral stenosis appears hypervolemic on exam. Unclear etiology. ACS workup negative    - Strict IOs, Daily weights  - lasix 40 PO qd, goal net -1L/d (switched from IV to PO)  - Cards consulted, recs reviewed.  - TTE with diastolic dysfunction, Mitral stenosis, LA englargement, severe pulm HTN  - hydral and isordil were PO so has not been getting so was very hypertensive but patient can now take PO and pressures wnl pt in acute decompensated heart failure, diastolic LV dysfunction and Mitral stenosis, now appears euvolemic on exam. Unclear etiology. ACS workup negative    - Strict IOs, Daily weights  - holding lasix 40mg PO 2/2 c/f overdiuresis  - Cards consulted, recs reviewed.  - TTE with diastolic dysfunction, Mitral stenosis, LA englargement, severe pulm HTN  - c/w hydral/isordil. will hold off on starting ACEi given MOISES

## 2020-10-29 NOTE — PROGRESS NOTE ADULT - PROBLEM SELECTOR PLAN 5
Appears to be back at baseline. DDx for encephalopathy included hyponatremia, hypercarbia, and hypoxemia pt w/ hx dysarthria, CAP, now w/ focal consolidations in RML RLL  - f/u sputum cx  - f/u urine legionella  - c/w zosyn (10/23 - ), day 6/7  - s/p azithro (10/23-10/24) pt w/ hx dysarthria, CAP, now w/ focal consolidations in RML RLL  - f/u sputum cx  - f/u urine legionella  - c/w zosyn (10/24 - 30), day 6/7  - s/p azithro (10/23-10/24)

## 2020-10-29 NOTE — PROGRESS NOTE ADULT - SUBJECTIVE AND OBJECTIVE BOX
HPI:  89 yo F with PMHx depression w/ suicide attempt, rhabdomyolysis, CVA, w/ dysarthria, falls now wheel chair bound, admitted in 2020 for AMS, CAP,  UTI, currently admitted for respiratory failure and AMS. Palliative Medicine was consulted for complex medical decision making related to goals of care discussions    =======================================================  10/29/2020    - Chart reviewed. Patient seen and examined.  - Case discussed in IDT. Discussed with HCN.  - HCN planning to follow-up with son about plan for home with hospice. From my standpoint, she is ready for discharge  - Patient has no new complaints. Remains on 2L NC    =======================================================  ----- SYMPTOM ASSESSMENT:   [ ]Unable to obtain due to poor mentation: information obtained from team/ contact    PAIN ASSESSMENT: DENIED  Site-   Onset-   Character-   Radiation-   Associated symptoms-   Timing and duration-   Exacerbating factors  Severity-     Effect on QOL-   PAIN AD Score: 0    Dyspnea:  Yes [ ] No [ ] - [ ]Mild [ ]Moderate [ ]Severe  Anxiety:    Yes [ ] No [ ] - [ ]Mild [ ]Moderate [ ]Severe  Fatigue:    Yes [ ] No [ ] - [ ]Mild [ ]Moderate [ ]Severe  Nausea:    Yes [ ] No [ ] - [ ]Mild [ ]Moderate [ ]Severe                         Loss of appetite: Yes [ ] No [ ] - [ ]Mild [ ]Moderate [ ]Severe             Constipation:  Yes [ ] No [ ] - [ ]Mild [ ]Moderate [ ]Severe  Grief: Yes [ ] No [ ]     [X ]All other review of systems negative, including: weakness, cough, colds, blurry vision, headaches, dysuria, pruritus, palpitations, muscle cramps, easy bruising, epistaxis, rashes  =======================================================  ----- PERTINENT PMH/ SXH/ FHX  Wheelchair bound    History of rhabdomyolysis    Attempted suicide    Depression    Constipation    Urinary incontinence    Stroke    No pertinent past medical history      S/P cholecystectomy    No significant past surgical history      FAMILY HISTORY:  No pertinent family history in first degree relatives    ----- SOCIAL HISTORY:   [ ] Unable to elicit  Significant other/partner:   (NINO)  Children: YES  Pentecostal/Spirituality:  Substance hx: Yes[ ]  No [ ]   Tobacco hx:  Yes [ ] No [ ]   Alcohol hx: Yes [ ] No [ ]   Home Opioid hx:  [ ] I-Stop Reference No:  Living Situation: [X ]Home  [ ]Long term care  [ ]Rehab [ ]Other  PATIENT LIVES WITH DYAN SANCHEZ (SON)    ----- ADVANCE DIRECTIVES:    DNR  Yes    Yes    MOLST  [ ]  Living Will  [ ]   DECISION MAKER(s):  [X ] Health Care Proxy(s)  [ ] Surrogate(s)  [ ] Guardian           Name(s): Phone Number(s):  DYAN SANCHEZ IS HCP @ [phone]  HCP FORM IN [facility]    ----- BASELINE (I)ADL(s) (prior to admission):  [place]: [ ]Total  [ ] Moderate [ ]Dependent  Palliative Performance Status Version 2: 40%    =======================================================  -----MEDICATIONS AND ALLERGIES:  Allergies    No Known Allergies    Intolerances    MEDICATIONS  (STANDING):  aspirin enteric coated 81 milliGRAM(s) Oral daily  enoxaparin Injectable 40 milliGRAM(s) SubCutaneous daily  hydrALAZINE 25 milliGRAM(s) Oral every 8 hours  isosorbide   dinitrate Tablet (ISORDIL) 10 milliGRAM(s) Oral three times a day  metoprolol tartrate 25 milliGRAM(s) Oral two times a day  piperacillin/tazobactam IVPB.. 3.375 Gram(s) IV Intermittent every 8 hours  polyethylene glycol 3350 17 Gram(s) Oral two times a day    MEDICATIONS  (PRN):  glucagon  Injectable 1 milliGRAM(s) IntraMuscular once PRN Glucose <70 milliGRAM(s)/deciLiter    =======================================================  ----- PHYSICAL EXAM:  Vital Signs Last 24 Hrs  Vital Signs Last 24 Hrs  T(C): 36.7 (29 Oct 2020 13:15), Max: 36.7 (29 Oct 2020 13:15)  T(F): 98.1 (29 Oct 2020 13:15), Max: 98.1 (29 Oct 2020 13:15)  HR: 53 (29 Oct 2020 13:15) (49 - 63)  BP: 116/50 (29 Oct 2020 13:15) (116/50 - 141/66)  BP(mean): 81 (29 Oct 2020 06:27) (81 - 81)  RR: 17 (29 Oct 2020 13:15) (17 - 18)  SpO2: 100% (29 Oct 2020 13:15) (96% - 100%)    GEN: Awake, alert, [place]  HEAD: Normocephalic and atraumatic,   EYES: Anicteric sclerae, EOM's grossly intact  NECK: No JVD, no thyromegaly  PULM: Comfortable work of breathing  CV: Pulses 2+ symmetric bilaterally, regular rate and rhythm  ABD: Not distended, soft, non-tender,   EXT: No edema, No deformities  PSYCH: Appropriate mood and affect, no suicidal ideations elicited  NEURO: No facial asymmetry, no tremors, no observed movement disorders  SKIN: No rashes or lesions on exposed skin, No jaundice    =======================================================  ----- LABS:             10-29    131<L>  |  86<L>  |  23  ----------------------------<  93  4.3   |  33<H>  |  1.34<H>    Ca    9.0      29 Oct 2020 06:00  Phos  3.9     10-29  Mg     2.1     10-29    TPro  6.5  /  Alb  3.4  /  TBili  0.7  /  DBili  x   /  AST  36<H>  /  ALT  15  /  AlkPhos  70  10-29    ************************************************************************  PALLIATIVE MEDICINE COORDINATION OF CARE DOCUMENTATION  [x] Inpatient Consult  [ ] Other:  ************************************************************************  MEDICATION REVIEW:  --- Pls refer to current medicatons in the body of this note  ISTOP REFERENCE: [id number]  --- PRN usage: NO PRN  ------------------------------------------------------------------------  COORDINATION OF CARE:  --- Palliative Care consulted for: [facility]  --- Patient assessed: 10/29  --- Patient previously seen by Palliative Care service: YES  ADVANCE CARE PLANNING  --- Code status: DNR, DNI  --- MOLST reviewed in chart: YES  --- HCP/ Surrogate: DYAN SANCHEZ IS HCP  --- GOC document found in [facility]: NO  --- HCP/ Living will/ Other advanced directives in Alpha: YES  ------------------------------------------------------------------------  CARE PROVIDER DOCUMENTATION:  --- Discussed with HCN. Will continue to reach out to son for home with hospice  --- CARDS: Continue metoprolol/ Sign off  --- SW/CM notes: Discussed with CM about plan for GOC discussions  --- SP/SW: Dysphagia 1 diet  --- Sp/Sw recs: 201 note reviewed  PLAN OF CARE  --- Known admissions in past year: 2  --- Current admit date: 10/24  --- LOS: 4  --- LACE score: 12  --- Current dispo plan: HOME WITH HOSPICE  ------------------------------------------------------------------------  --- Chart reviewed: 30 Minutes [including time used to gather, review and transfer data to this note]  --- Start: 8:30  --- End: 9:00  Prolonged services rendered, as part of this patient's care provided by Palliative Medicine, include: i.chart review for provider and ancillary service documentation, ii.pertinent diagnostics including laboratory and imaging studies,iii. medication review including PRN use, iv. admission history including previous palliative care encounters and GOC notes, v.advance care planning documents including HCP and MOLST forms in [facility]. Part of Palliative Medicine extended evaluation and management also involves coordination of care with our IDT, the primary and consulting feroz, and unit CM/SW and Hospice if eligible. Recommendations based on the information gathered and discussed are outline in the AP of our notes.    ************************************************************************

## 2020-10-29 NOTE — PROGRESS NOTE ADULT - PROBLEM SELECTOR PLAN 4
pt w/ hx dysarthria, CAP, now w/ focal consolidations in RML RLL  - f/u sputum cx  - f/u urine legionella  - c/w zosyn (10/23 - ), day 6/7  - s/p azithro (10/23-10/24) Patient developed afib with RVR yesterday after IV insertion. Possibly related to stress of hospitalization vs LA enlargement i/s/o mitral stenosis vs overdiuresis.    -patient converted yesterday afternoon  -c/w lopressor 25 BID  -CHADSVASc 7 but agree with cardiology recommendations, will withhold AC at this time given suicide attempt, history of CVA Patient developed afib with RVR 10/27 after IV insertion. Possibly related to stress of hospitalization vs LA enlargement i/s/o mitral stenosis vs overdiuresis.    -now in NSR HR 50-60s  -c/w lopressor 25 BID  -CHADSVASc 7 but agree with cardiology recommendations, will withhold AC at this time given prior suicide attempt, history of CVA. discussed recs w/ HCP Jarred

## 2020-10-30 ENCOUNTER — TRANSCRIPTION ENCOUNTER (OUTPATIENT)
Age: 85
End: 2020-10-30

## 2020-10-30 LAB
ALBUMIN SERPL ELPH-MCNC: 3.5 G/DL — SIGNIFICANT CHANGE UP (ref 3.3–5)
ALP SERPL-CCNC: 68 U/L — SIGNIFICANT CHANGE UP (ref 40–120)
ALT FLD-CCNC: 18 U/L — SIGNIFICANT CHANGE UP (ref 4–33)
ANION GAP SERPL CALC-SCNC: 11 MMO/L — SIGNIFICANT CHANGE UP (ref 7–14)
AST SERPL-CCNC: 31 U/L — SIGNIFICANT CHANGE UP (ref 4–32)
BASE EXCESS BLDV CALC-SCNC: 13.6 MMOL/L — SIGNIFICANT CHANGE UP
BILIRUB SERPL-MCNC: 0.7 MG/DL — SIGNIFICANT CHANGE UP (ref 0.2–1.2)
BLOOD GAS VENOUS - CREATININE: 1.67 MG/DL — HIGH (ref 0.5–1.3)
BUN SERPL-MCNC: 29 MG/DL — HIGH (ref 7–23)
CALCIUM SERPL-MCNC: 9 MG/DL — SIGNIFICANT CHANGE UP (ref 8.4–10.5)
CHLORIDE BLDV-SCNC: 84 MMOL/L — LOW (ref 96–108)
CHLORIDE SERPL-SCNC: 85 MMOL/L — LOW (ref 98–107)
CO2 SERPL-SCNC: 34 MMOL/L — HIGH (ref 22–31)
CREAT SERPL-MCNC: 1.47 MG/DL — HIGH (ref 0.5–1.3)
GAS PNL BLDV: 129 MMOL/L — LOW (ref 136–146)
GLUCOSE BLDV-MCNC: 96 MG/DL — SIGNIFICANT CHANGE UP (ref 70–99)
GLUCOSE SERPL-MCNC: 91 MG/DL — SIGNIFICANT CHANGE UP (ref 70–99)
HCO3 BLDV-SCNC: 36 MMOL/L — HIGH (ref 20–27)
HCT VFR BLD CALC: 35 % — SIGNIFICANT CHANGE UP (ref 34.5–45)
HCT VFR BLDV CALC: 35.6 % — SIGNIFICANT CHANGE UP (ref 34.5–45)
HGB BLD-MCNC: 10.8 G/DL — LOW (ref 11.5–15.5)
HGB BLDV-MCNC: 11.6 G/DL — SIGNIFICANT CHANGE UP (ref 11.5–15.5)
LACTATE BLDV-MCNC: 0.9 MMOL/L — SIGNIFICANT CHANGE UP (ref 0.5–2)
LACTATE SERPL-SCNC: 0.6 MMOL/L — SIGNIFICANT CHANGE UP (ref 0.5–2)
MAGNESIUM SERPL-MCNC: 2.1 MG/DL — SIGNIFICANT CHANGE UP (ref 1.6–2.6)
MCHC RBC-ENTMCNC: 28.2 PG — SIGNIFICANT CHANGE UP (ref 27–34)
MCHC RBC-ENTMCNC: 30.9 % — LOW (ref 32–36)
MCV RBC AUTO: 91.4 FL — SIGNIFICANT CHANGE UP (ref 80–100)
NRBC # FLD: 0 K/UL — SIGNIFICANT CHANGE UP (ref 0–0)
PCO2 BLDV: 72 MMHG — HIGH (ref 41–51)
PH BLDV: 7.36 PH — SIGNIFICANT CHANGE UP (ref 7.32–7.43)
PHOSPHATE SERPL-MCNC: 4.8 MG/DL — HIGH (ref 2.5–4.5)
PLATELET # BLD AUTO: 145 K/UL — LOW (ref 150–400)
PMV BLD: 10.3 FL — SIGNIFICANT CHANGE UP (ref 7–13)
PO2 BLDV: 125 MMHG — HIGH (ref 35–40)
POTASSIUM BLDV-SCNC: 4.1 MMOL/L — SIGNIFICANT CHANGE UP (ref 3.4–4.5)
POTASSIUM SERPL-MCNC: 4.5 MMOL/L — SIGNIFICANT CHANGE UP (ref 3.5–5.3)
POTASSIUM SERPL-SCNC: 4.5 MMOL/L — SIGNIFICANT CHANGE UP (ref 3.5–5.3)
PROT SERPL-MCNC: 6.5 G/DL — SIGNIFICANT CHANGE UP (ref 6–8.3)
RBC # BLD: 3.83 M/UL — SIGNIFICANT CHANGE UP (ref 3.8–5.2)
RBC # FLD: 14.6 % — HIGH (ref 10.3–14.5)
SAO2 % BLDV: 98.8 % — HIGH (ref 60–85)
SODIUM SERPL-SCNC: 130 MMOL/L — LOW (ref 135–145)
WBC # BLD: 7.39 K/UL — SIGNIFICANT CHANGE UP (ref 3.8–10.5)
WBC # FLD AUTO: 7.39 K/UL — SIGNIFICANT CHANGE UP (ref 3.8–10.5)

## 2020-10-30 PROCEDURE — 99233 SBSQ HOSP IP/OBS HIGH 50: CPT | Mod: GC

## 2020-10-30 RX ORDER — ISOSORBIDE DINITRATE 5 MG/1
1 TABLET ORAL
Qty: 90 | Refills: 1
Start: 2020-10-30 | End: 2020-12-28

## 2020-10-30 RX ORDER — HYDRALAZINE HCL 50 MG
1 TABLET ORAL
Qty: 90 | Refills: 1
Start: 2020-10-30 | End: 2020-12-28

## 2020-10-30 RX ORDER — METOPROLOL TARTRATE 50 MG
1 TABLET ORAL
Qty: 60 | Refills: 1
Start: 2020-10-30 | End: 2020-12-28

## 2020-10-30 RX ORDER — POLYETHYLENE GLYCOL 3350 17 G/17G
17 POWDER, FOR SOLUTION ORAL
Qty: 0 | Refills: 0 | DISCHARGE
Start: 2020-10-30

## 2020-10-30 RX ADMIN — PIPERACILLIN AND TAZOBACTAM 25 GRAM(S): 4; .5 INJECTION, POWDER, LYOPHILIZED, FOR SOLUTION INTRAVENOUS at 17:24

## 2020-10-30 RX ADMIN — PIPERACILLIN AND TAZOBACTAM 25 GRAM(S): 4; .5 INJECTION, POWDER, LYOPHILIZED, FOR SOLUTION INTRAVENOUS at 10:18

## 2020-10-30 RX ADMIN — POLYETHYLENE GLYCOL 3350 17 GRAM(S): 17 POWDER, FOR SOLUTION ORAL at 17:24

## 2020-10-30 RX ADMIN — ISOSORBIDE DINITRATE 10 MILLIGRAM(S): 5 TABLET ORAL at 22:56

## 2020-10-30 RX ADMIN — Medication 25 MILLIGRAM(S): at 22:56

## 2020-10-30 RX ADMIN — POLYETHYLENE GLYCOL 3350 17 GRAM(S): 17 POWDER, FOR SOLUTION ORAL at 05:35

## 2020-10-30 RX ADMIN — ENOXAPARIN SODIUM 40 MILLIGRAM(S): 100 INJECTION SUBCUTANEOUS at 13:29

## 2020-10-30 RX ADMIN — PIPERACILLIN AND TAZOBACTAM 25 GRAM(S): 4; .5 INJECTION, POWDER, LYOPHILIZED, FOR SOLUTION INTRAVENOUS at 00:25

## 2020-10-30 RX ADMIN — ISOSORBIDE DINITRATE 10 MILLIGRAM(S): 5 TABLET ORAL at 05:35

## 2020-10-30 RX ADMIN — Medication 25 MILLIGRAM(S): at 05:35

## 2020-10-30 RX ADMIN — Medication 25 MILLIGRAM(S): at 13:29

## 2020-10-30 RX ADMIN — Medication 25 MILLIGRAM(S): at 17:24

## 2020-10-30 RX ADMIN — Medication 81 MILLIGRAM(S): at 13:29

## 2020-10-30 RX ADMIN — ISOSORBIDE DINITRATE 10 MILLIGRAM(S): 5 TABLET ORAL at 13:29

## 2020-10-30 NOTE — DISCHARGE NOTE PROVIDER - NSDCMRMEDTOKEN_GEN_ALL_CORE_FT
aspirin 81 mg oral delayed release tablet: 1 tab(s) orally once a day  polyethylene glycol 3350 oral powder for reconstitution: 17 gram(s) orally 2 times a day   aspirin 81 mg oral delayed release tablet: 1 tab(s) orally once a day  hydrALAZINE 25 mg oral tablet: 1 tab(s) orally every 8 hours  isosorbide dinitrate 10 mg oral tablet: 1 tab(s) orally 3 times a day  metoprolol tartrate 25 mg oral tablet: 1 tab(s) orally 2 times a day  polyethylene glycol 3350 oral powder for reconstitution: 17 gram(s) orally 2 times a day

## 2020-10-30 NOTE — PROGRESS NOTE ADULT - SUBJECTIVE AND OBJECTIVE BOX
*INCOMPLETE NOTE*  *******************************************************  Adriel Rivera MD PGY-1  Internal Medicine  Pager [phone] / [phone]  *******************************************************  Patient is a 89y old  Female who presents with a chief complaint of hypoxia (29 Oct 2020 14:08)        OVERNIGHT EVENTS:  - Pending hospice  - worsening renal failure  - VBG consistent with chronic respiratory acidosis with additional metabolic alkalosis      NOTE TO BE UPDATED AFTER ROUNDS             *******************************************************  Adriel Rivera MD PGY-1  Internal Medicine  Pager ([facility]) [phone] / (NQZ) [phone]  *******************************************************  Patient is a 89y old  Female who presents with a chief complaint of hypoxia (30 Oct 2020 11:56)        SUBJECTIVE / OVERNIGHT EVENTS:  - No acute events overnight  - Patient seen and evaluated at bedside.  - No complaints patient endorses breathing well off machine.  - Denies fevers/chills, headache, SOB at rest, cough, chest pain, palpitations, abdominal pain, nausea/vomiting/diarrhea/constipation, melena/hematochezia, dysuria, and hematuria    ------------------------------------------------------------------------------------------------------------    MEDICATIONS  (STANDING):  aspirin enteric coated 81 milliGRAM(s) Oral daily  enoxaparin Injectable 40 milliGRAM(s) SubCutaneous daily  hydrALAZINE 25 milliGRAM(s) Oral every 8 hours  isosorbide   dinitrate Tablet (ISORDIL) 10 milliGRAM(s) Oral three times a day  metoprolol tartrate 25 milliGRAM(s) Oral two times a day  piperacillin/tazobactam IVPB.. 3.375 Gram(s) IV Intermittent every 8 hours  polyethylene glycol 3350 17 Gram(s) Oral two times a day    MEDICATIONS  (PRN):  glucagon  Injectable 1 milliGRAM(s) IntraMuscular once PRN Glucose <70 milliGRAM(s)/deciLiter      ------------------------------------------------------------------------------------------------------------    OBJECTIVE:    CAPILLARY BLOOD GLUCOSE      POCT Blood Glucose.: 115 mg/dL (29 Oct 2020 22:18)  POCT Blood Glucose.: 106 mg/dL (29 Oct 2020 17:32)    I&O's Summary    29 Oct 2020 07:  -  30 Oct 2020 07:00  --------------------------------------------------------  IN: 268 mL / OUT: 200 mL / NET: 68 mL    30 Oct 2020 07:  -  30 Oct 2020 13:32  --------------------------------------------------------  IN: 0 mL / OUT: 100 mL / NET: -100 mL      Daily     Daily Weight in k (29 Oct 2020 22:37)    PHYSICAL EXAM:  T(F): 98, Max: 98.2 (10-29-20 @ 22:37)  HR: 51 (48 - 61)  BP: 126/58 (121/46 - 128/50)  RR: 20 (17 - 20)  SpO2: 99% (94% - 100%)      PHYSICAL EXAM:  GENERAL: obese elderly female, moderately ill appearing, pleasant, on 2L NC, in NAD  HEAD:  Atraumatic, normocephalic  EYES: EMOI, sclera clear  CHEST/LUNG: difficult to appreciate due to habitus but scattered rales upper lung fields b/l; no wheeze  HEART: RRR; S1, S2; no M/R/G  ABDOMEN: soft, non-distended; non-tender; BS present  EXTREMITIES:  1+ pitting edema R hand and no edema in LUE, peripheral pulses intact; thin lower extremities w/ NO edema  NEURO: non-focal  PSYCH: not assessed  SKIN: proximal extremities warm, no rashes or lesions    ------------------------------------------------------------------------------------------------------------  LABS:                        10.8   7.39  )-----------( 145      ( 30 Oct 2020 06:00 )             35.0     10-30    130<L>  |  85<L>  |  29<H>  ----------------------------<  91  4.5   |  34<H>  |  1.47<H>    Ca    9.0      30 Oct 2020 06:00  Phos  4.8     10-30  Mg     2.1     10-30    TPro  6.5  /  Alb  3.5  /  TBili  0.7  /  DBili  x   /  AST  31  /  ALT  18  /  AlkPhos  68  10-30                RADIOLOGY & ADDITIONAL TESTS:  Results Reviewed:     Imaging Personally Reviewed:  Electrocardiogram Personally Reviewed:      COORDINATION OF CARE:  Care discussed with consultants/other providers and notes reviewed [Y]/[N]:   Prior or Outpatient Records Reviewed [Y]/[N]:   ------------------------------------------------------------------------------------------------------------

## 2020-10-30 NOTE — PROGRESS NOTE ADULT - PROBLEM SELECTOR PLAN 5
pt w/ hx dysarthria, CAP, now w/ focal consolidations in RML RLL  - c/w zosyn (10/24 - 30), day 7/7  - s/p azithro (10/23-10/24)

## 2020-10-30 NOTE — PROGRESS NOTE ADULT - ASSESSMENT
89F w/ depression w/ hx suicide attempt, rhabdomyolysis, CVA c/b dysarthria, hx falls now wheelchair bound, and pAF dx'd this admission who p/w acute metabolic encephalopathy and hypoxemic, hypercarbic respiratory failure 2/2 ADHF (being diuresed w/ lasix, started on hyrdral/isordil) and aspiration PNA, pansensitive pseudomonas UTI, being treated w/ zosyn. Mental status remains improved off AVAPS. Patient is no longer hypoxemic and acid/base status is improving. Now w/ MOISES and metabolic alkalosis suspected 2/2 overdiuresis, holding lasix.

## 2020-10-30 NOTE — PROGRESS NOTE ADULT - PROBLEM SELECTOR PLAN 2
acute hypoxemic hypercapnic respiratory failure in the setting of volume overload and suspected aspiration event.    -Patient tolerated trial off AVAPS all day yesterday and night  - will continue to trial off   - watch closely for signs of AMS - return to AVAPs if lethargic  - passed swallow eval for nectar thickened foods - diet ordered

## 2020-10-30 NOTE — DISCHARGE NOTE PROVIDER - HOSPITAL COURSE
HPI:   89 yo F with PMHx depression w/ suicide attempt, rhabdomyolysis, CVA, w/ dysarthria, falls now wheel chair bound, admitted in Jan 2020 for AMS, CAP,  UTI, presents to the ED via EMS for AMS, dyspnea and hypoxia to 65% on RA. Was placed on CPAP by EMS with improvement. Was also given sublingual nitro x2 for hypertension. While in the ED, the patient has a desaturation while on bipap 10/5, 40%. FIO2 increased to 90%. Patient also had hyperkalemia on admission which was treated with calcium gluconate D50, Regular insulin 5U, lasix 20 with good results. MICU consulted and patient was deemed stable for floor with AVAPS. Etiology of respiratory failure was thought to be acute decompensated heart failure exacerbation +/- aspiration pneumonia. Patient was started on daily IV lasix and piperacillin-tazobactam for 7 days. The patient's mental status improved with diuresis and antibiotics and was able to be weaned off of AVAPS. Echocardiogram was performed which revealed diastolic dysfunction with preserved EF, severe functional mitral stenosis with dilated LA, and severe pulmonary hypertension. Patient did have an episode of paroxysmal atrial fibrillation with rapid-ventricular response which was treated with metoprolol tartrate 25mg twice daily and converted back to sinus rhythm the same evening. Decision was made not to anticoagulate due to patient's comorbidities. Palliative care was consulted and patient was transitioned towards comfort care and decision was made to pursue home hospice.

## 2020-10-30 NOTE — PROGRESS NOTE ADULT - PROBLEM SELECTOR PLAN 10
HCP, Jarred Jairo (son) @ [phone]. Per palliative GOC note plan is for hospice (inpatient preferred)  PCP Allison BYRNE form in Chart, DNR/DNI

## 2020-10-30 NOTE — PROGRESS NOTE ADULT - PROBLEM SELECTOR PLAN 3
pt in acute decompensated heart failure, diastolic LV dysfunction and Mitral stenosis, now appears euvolemic on exam. Unclear etiology. ACS workup negative    - Strict IOs, Daily weights  - holding lasix 40mg PO 2/2 c/f overdiuresis  - Cards consulted, recs reviewed.  - TTE with diastolic dysfunction, Mitral stenosis, LA englargement, severe pulm HTN  - c/w hydral/isordil. will hold off on starting ACEi given MOISES

## 2020-10-30 NOTE — PROGRESS NOTE ADULT - ATTENDING COMMENTS
Patient seen and examined by me. Case discussed with resident and agree with the resident's findings and plan as documented in the resident's note. 89F h/o depression w/ suicide attempt, rhabdomyolysis, CVA, w/ dysarthria, falls now wheel chair bound, admitted in Jan 2020 for AMS who presents for hypoxemic, hypercarbic respiratory failure 2/2 ADHF and aspiration PNA c/b metabolic encephalopathy and bacteremia 2/2 Staph epidermidis likely contaminant.    1. Hypoxemic, hypercarbic respiratory failure 2/2 ADHF and superimposed aspiration PNA +/- obesity hypoventilation:  -PNA: c/w zosyn #7/7; f/u urine legionella and sputum cultures; c/w aspiration precautions  -acute diastolic CHF: currently lasix on hold today given MOISES -- pt was likely overdiureses  -if creatinine improved tomorrow, will consider restarting at a lower dose (lasix 20mg PO daily)  -monitor I/Os; echo reviewed; f/u cards recs  -patient doing well during the day and night off the AVAPs; just on oxygen  -repeat VBG with lactate reviewed -- elevated lactate resolved    2. HTN:  -c/w hydralazine 25mg PO TID, isordil 10mg tid and metoprolol 25mg BID    3. H/o CVA:  -improved mental status  -speech and swallow reviewed -- diet adjusted    4. Pseudo in urine:  -urine sensitive to zosyn  -on zosyn already    5. Bacteremia 2/2 Staph. Epi:  -BCx from 10/26 no growth to day    6. Hyponatremia:  -resolved    7. Metabolic encephalopathy:  -Metabolic encephalopathy like 2/2 hypercarbia, HF, hypoNa, PNA, hypoxia.   -resolved since being on AVAPS    8. Urinary incontinence:  -passed TOV  -c/w primafit    9. Afib:  -patient no longer in AFib  -currently on metoprolol 25mg BID; per cards, ok to continue with current dose   -per cards, patient is high risk for CVA given comorbidities however given hx of CVA, depression and suicide attempt would defer a/c at this time    10. Dysphagia:  -speech and swallow reviewed    11. Dispo:   -patient's son amenable to home hospice  -f/u palliative care recs and hospice referral Patient seen and examined by me. Case discussed with resident and agree with the resident's findings and plan as documented in the resident's note. 89F h/o depression w/ suicide attempt, rhabdomyolysis, CVA, w/ dysarthria, falls now wheel chair bound, admitted in Jan 2020 for AMS who presents for hypoxemic, hypercarbic respiratory failure 2/2 ADHF and aspiration PNA c/b metabolic encephalopathy and bacteremia 2/2 Staph epidermidis likely contaminant.    1. Hypoxemic, hypercarbic respiratory failure 2/2 ADHF and superimposed aspiration PNA +/- obesity hypoventilation:  -PNA: c/w zosyn #7/7; f/u urine legionella and sputum cultures; c/w aspiration precautions  -acute diastolic CHF: currently lasix on hold today given MOISES -- pt was likely overdiureses  -if creatinine improved tomorrow, will consider restarting at a lower dose (lasix 20mg PO daily)  -monitor I/Os; echo reviewed; f/u cards recs  -patient doing well during the day and night off the AVAPs; just on oxygen  -repeat VBG with lactate reviewed -- elevated lactate resolved    2. HTN:  -c/w hydralazine 25mg PO TID, isordil 10mg tid and metoprolol 25mg BID    3. H/o CVA:  -improved mental status  -speech and swallow reviewed -- diet adjusted    4. Pseudo in urine:  -urine sensitive to zosyn  -on zosyn already    5. Bacteremia 2/2 Staph. Epi:  -BCx from 10/26 no growth to day    6. Hyponatremia:  -resolved    7. Metabolic encephalopathy:  -Metabolic encephalopathy like 2/2 hypercarbia, HF, hypoNa, PNA, hypoxia.   -resolved since being on AVAPS    8. Urinary incontinence:  -passed TOV  -c/w primafit    9. Afib:  -patient no longer in AFib  -currently on metoprolol 25mg BID; per cards, ok to continue with current dose   -per cards, patient is high risk for CVA given comorbidities however given hx of CVA, depression and suicide attempt would defer a/c at this time    10. Dysphagia:  -speech and swallow reviewed    11. Dispo:   -patient's son amenable to home hospice  -f/u palliative care recs and hospice referral  -possible discharge today to home hospice  -time spent discharging patient = 39min

## 2020-10-30 NOTE — DISCHARGE NOTE PROVIDER - NSDCCPCAREPLAN_GEN_ALL_CORE_FT
PRINCIPAL DISCHARGE DIAGNOSIS  Diagnosis: Acute respiratory failure with hypoxia and hypercapnia  Assessment and Plan of Treatment: You came to the hospital because you were confused and were having trouble breathing. We think that your confusion was due to low oxygen levels and high carbon-dioxide levels in your blood because your breathing was impaired. The breathing problems were due to two things. The first was something called aspiration pneumonia which is a lung infection caused by accidentally swallowing food or stomach contents into the windpipe and down into the lungs. We treated you for this with an antibiotics called piperacillin-tazobactam for 7 days. To avoid this in the future, be sure to eat sitting up completely straight. You had a swallow evaluation which revealed that you need special nectar-thickened fluids to prevent aspiration. The second reason your breathing was impaired was a heart failure exacerbation. When this happens your heart is unable to pump adequately and blood backs up to the blood vessels in the lungs where fluid leaks out into your lung tissue. We treated you for this with diuretics which caused you to urinate the excess fluid in your body and remove the fluid from your lungs. In the meantime you were placed on a breathing machine called AVAPS which helped you breathe through a facemask by applying pressure and forcing air in and out of your lungs. As the infection got better and the fluid was removed your lung function improved and we were able to wean you off of the machine. If your breathing starts to get worse while at home please call the doctor/medical director of the hospice program to discuss what to do next (possibly moving to inpatient based hospice)/      SECONDARY DISCHARGE DIAGNOSES  Diagnosis: Paroxysmal atrial fibrillation  Assessment and Plan of Treatment: Paroxysmal atrial fibrillation     PRINCIPAL DISCHARGE DIAGNOSIS  Diagnosis: Acute respiratory failure with hypoxia and hypercapnia  Assessment and Plan of Treatment: You came to the hospital because you were confused and were having trouble breathing. We think that your confusion was due to low oxygen levels and high carbon-dioxide levels in your blood because your breathing was impaired. The breathing problems were due to two things. The first was something called aspiration pneumonia which is a lung infection caused by accidentally swallowing food or stomach contents into the windpipe and down into the lungs. We treated you for this with an antibiotics called piperacillin-tazobactam for 7 days. To avoid this in the future, be sure to eat sitting up completely straight. You had a swallow evaluation which revealed that you need special nectar-thickened fluids to prevent aspiration. The second reason your breathing was impaired was a heart failure exacerbation. When this happens your heart is unable to pump adequately and blood backs up to the blood vessels in the lungs where fluid leaks out into your lung tissue. We treated you for this with diuretics which caused you to urinate the excess fluid in your body and remove the fluid from your lungs. In the meantime you were placed on a breathing machine called AVAPS which helped you breathe through a facemask by applying pressure and forcing air in and out of your lungs. As the infection got better and the fluid was removed your lung function improved and we were able to wean you off of the machine. If your breathing starts to get worse while at home please call the doctor/medical director of the hospice program to discuss what to do next (possibly moving to inpatient based hospice)/      SECONDARY DISCHARGE DIAGNOSES  Diagnosis: Paroxysmal atrial fibrillation  Assessment and Plan of Treatment: You had a short period of an abnormal heart rhythm known as atrial fibrillation. This was treated with a medication called metoprolol which is a Beta Blocker. After several hours your heart rhythm converted back to a normal rhythm. In some situations with this heart rhythm we would also use a blood thinner to prevent a clot but in your situation the risks of bleed outweigh the benefits of clot prevention.

## 2020-10-30 NOTE — PROGRESS NOTE ADULT - PROBLEM SELECTOR PLAN 4
Patient developed afib with RVR 10/27 after IV insertion. Possibly related to stress of hospitalization vs LA enlargement i/s/o mitral stenosis vs overdiuresis.    -now in NSR HR 50-60s  -c/w lopressor 25 BID  -CHADSVASc 7 but agree with cardiology recommendations, will withhold AC at this time given prior suicide attempt, history of CVA. discussed recs w/ HCP Jarred

## 2020-10-30 NOTE — PROGRESS NOTE ADULT - PROBLEM SELECTOR PLAN 1
Cr 1.34 <- 0.72 w/ hyponatremia, hypochloremia and increased bicarb, elevated lactate suspected 2/2 contraction alkalosis from overdiuresis    -will hold lasix 40mg PO tmrw; resume at 20mg qd on 10/31 if MOISES improves  -still with worsening MOISES, will c/w holding lasix thania considering met alk  -monitor UOP  -lactate wnl, do not suspect impaired perfusion

## 2020-10-30 NOTE — GOALS OF CARE CONVERSATION - ADVANCED CARE PLANNING - CONVERSATION DETAILS
HOSPICE CARE NETWORK    HCN RN spoke with pt's son last evening. Pt son inquired abou inpatient hospice care.  Discussed care planning with him and explained:  noninvasive ventilators (BiPAP & AVAPS) are not used in hospice inpatient units. Discussed the possibility of home hospice care and explained HCN RN will assess pt in person at [facility]    . Assessment done this morning. Pt is currently requiring supplemental o2 via nasal cannula with BiPAP used at night. Will follow up with pt's son regarding planning again this afternoon.     Hospice folder of information left for pt's family at bedside.    HCN RNs continue to follow.
[facility] - Consents emailed to son and reviewed. Awaiting signed consents.
[facility] - Pt approved for home hospice by Dr. Ludmila Allen. A hosp bed, air mattress, O2 at 2L n/c cont and a large w/c will be delivered to the home this evening. A BP cuff has also been ordered. Consents obtained and 5 days, 4 hrs of a HHA has been requested subject to availability.

## 2020-10-31 ENCOUNTER — TRANSCRIPTION ENCOUNTER (OUTPATIENT)
Age: 85
End: 2020-10-31

## 2020-10-31 VITALS
OXYGEN SATURATION: 99 % | TEMPERATURE: 97 F | HEART RATE: 54 BPM | RESPIRATION RATE: 18 BRPM | DIASTOLIC BLOOD PRESSURE: 64 MMHG | SYSTOLIC BLOOD PRESSURE: 129 MMHG

## 2020-10-31 LAB
CULTURE RESULTS: SIGNIFICANT CHANGE UP
CULTURE RESULTS: SIGNIFICANT CHANGE UP
SPECIMEN SOURCE: SIGNIFICANT CHANGE UP
SPECIMEN SOURCE: SIGNIFICANT CHANGE UP

## 2020-10-31 PROCEDURE — 99239 HOSP IP/OBS DSCHRG MGMT >30: CPT | Mod: GC

## 2020-10-31 RX ORDER — FUROSEMIDE 40 MG
1 TABLET ORAL
Qty: 30 | Refills: 1
Start: 2020-10-31 | End: 2020-12-29

## 2020-10-31 RX ORDER — IPRATROPIUM/ALBUTEROL SULFATE 18-103MCG
3 AEROSOL WITH ADAPTER (GRAM) INHALATION ONCE
Refills: 0 | Status: COMPLETED | OUTPATIENT
Start: 2020-10-31 | End: 2020-10-31

## 2020-10-31 RX ORDER — IPRATROPIUM/ALBUTEROL SULFATE 18-103MCG
3 AEROSOL WITH ADAPTER (GRAM) INHALATION
Qty: 0 | Refills: 0 | DISCHARGE
Start: 2020-10-31

## 2020-10-31 RX ADMIN — ISOSORBIDE DINITRATE 10 MILLIGRAM(S): 5 TABLET ORAL at 07:08

## 2020-10-31 RX ADMIN — Medication 3 MILLILITER(S): at 09:56

## 2020-10-31 RX ADMIN — POLYETHYLENE GLYCOL 3350 17 GRAM(S): 17 POWDER, FOR SOLUTION ORAL at 07:08

## 2020-10-31 RX ADMIN — Medication 25 MILLIGRAM(S): at 07:08

## 2020-10-31 NOTE — PROGRESS NOTE ADULT - ASSESSMENT
89F w/ depression w/ hx suicide attempt, rhabdomyolysis, CVA c/b dysarthria, hx falls now wheelchair bound, and pAF dx'd this admission who p/w acute metabolic encephalopathy and hypoxemic, hypercarbic respiratory failure 2/2 ADHF (being diuresed w/ lasix, started on hyrdral/isordil) and aspiration PNA, pansensitive pseudomonas UTI, being treated w/ zosyn. Mental status remains improved off AVAPS. Patient is no longer hypoxemic and acid/base status is improving. Now w/ MOISES and metabolic alkalosis suspected 2/2 overdiuresis, holding lasix. Plan for d/c home with hospice.

## 2020-10-31 NOTE — PROGRESS NOTE ADULT - PROBLEM SELECTOR PLAN 1
Cr 1.34 <- 0.72 w/ hyponatremia, hypochloremia and increased bicarb, elevated lactate suspected 2/2 contraction alkalosis from overdiuresis    -will resume at 20mg qd on 10/31 if MOISES improves  -monitor UOP  -lactate wnl, do not suspect impaired perfusion Cr 1.34 <- 0.72 w/ hyponatremia, hypochloremia and increased bicarb, elevated lactate suspected 2/2 contraction alkalosis from overdiuresis    -will resume at 20mg qd on 10/31  -monitor UOP  -lactate wnl, do not suspect impaired perfusion

## 2020-10-31 NOTE — PROGRESS NOTE ADULT - NSHPATTENDINGPLANDISCUSS_GEN_ALL_CORE
resident Dr. Renteria
resident Dr. Renteria
Residents
resident Dr. Renteria
resident Dr. Rivera
resident Dr. Renteria
resident Dr. Rivera
Residents

## 2020-10-31 NOTE — DISCHARGE NOTE NURSING/CASE MANAGEMENT/SOCIAL WORK - PATIENT PORTAL LINK FT
You can access the FollowMyHealth Patient Portal offered by [facility] by registering at the following website: [email or web address]. By joining [employer]’s FollowMyHealth portal, you will also be able to view your health information using other applications (apps) compatible with our system.

## 2020-10-31 NOTE — PROGRESS NOTE ADULT - PROBLEM SELECTOR PLAN 5
pt w/ hx dysarthria, CAP, now w/ focal consolidations in RML RLL  - s/p zosyn (10/24 - 30)  - s/p azithro (10/23-10/24)

## 2020-10-31 NOTE — PROGRESS NOTE ADULT - PROBLEM SELECTOR PLAN 10
HCP, Jarred Jairo (son) @ [phone]. Per palliative GOC note plan is for hospice   PCP Allison BYRNE form in Chart, DNR/DNI

## 2020-10-31 NOTE — PROGRESS NOTE ADULT - PROBLEM SELECTOR PLAN 2
acute hypoxemic hypercapnic respiratory failure in the setting of volume overload and suspected aspiration event.    -Patient tolerated trial off AVAPS all day yesterday and night  - will continue to trial off   - watch closely for signs of AMS - return to AVAPs if lethargic  - passed swallow eval for nectar thickened foods - diet ordered acute hypoxemic hypercapnic respiratory failure in the setting of volume overload and suspected aspiration event.    -had some wheezing this AM which improved with duoneb x1  -Patient tolerated trial off AVAPS past two days without AMS  - stable on 2L NC SpO2 >92%  - passed swallow eval for nectar thickened foods - diet ordered

## 2020-10-31 NOTE — DISCHARGE NOTE NURSING/CASE MANAGEMENT/SOCIAL WORK - NSDCPEPTSTRK_GEN_ALL_CORE
Need for follow up after discharge/Prescribed medications/Stroke education booklet/Signs and symptoms of stroke/Call 911 for stroke/Risk factors for stroke/Stroke support groups for patients, families, and friends/Stroke warning signs and symptoms

## 2020-10-31 NOTE — PROGRESS NOTE ADULT - PROBLEM/PLAN-9
Home
DISPLAY PLAN FREE TEXT

## 2020-10-31 NOTE — PROGRESS NOTE ADULT - SUBJECTIVE AND OBJECTIVE BOX
*******************************************************  Adriel Rivera MD PGY-1  Internal Medicine  Pager ([facility]) [phone] / (GTP) [phone]  *******************************************************  Patient is a 89y old  Female who presents with a chief complaint of hypoxia (30 Oct 2020 11:56)      SUBJECTIVE / OVERNIGHT EVENTS:  - No acute events overnight.   - Patient seen and evaluated at bedside.  - Endorses feeling well and breathing ok. Excited to go home.  - Denies fevers/chills, headache, SOB at rest, cough, chest pain, palpitations, abdominal pain, nausea/vomiting/diarrhea/constipation, melena/hematochezia, dysuria, and hematuria    ------------------------------------------------------------------------------------------------------------    MEDICATIONS  (STANDING):  albuterol/ipratropium for Nebulization 3 milliLiter(s) Nebulizer once  aspirin enteric coated 81 milliGRAM(s) Oral daily  enoxaparin Injectable 40 milliGRAM(s) SubCutaneous daily  hydrALAZINE 25 milliGRAM(s) Oral every 8 hours  isosorbide   dinitrate Tablet (ISORDIL) 10 milliGRAM(s) Oral three times a day  metoprolol tartrate 25 milliGRAM(s) Oral two times a day  polyethylene glycol 3350 17 Gram(s) Oral two times a day    MEDICATIONS  (PRN):  glucagon  Injectable 1 milliGRAM(s) IntraMuscular once PRN Glucose <70 milliGRAM(s)/deciLiter      ------------------------------------------------------------------------------------------------------------    OBJECTIVE:    I&O's Summary    30 Oct 2020 07:01  -  31 Oct 2020 07:00  --------------------------------------------------------  IN: 625 mL / OUT: 790 mL / NET: -165 mL         PHYSICAL EXAM:  T(F): 98, Max: 98 (10-30-20 @ 13:26)  HR: 51 (49 - 56)  BP: 134/64 (126/58 - 134/64)  RR: 19 (18 - 20)  SpO2: 99% (94% - 100%)        PHYSICAL EXAM:  GENERAL: obese elderly female, moderately ill appearing, pleasant, on 2L NC, in NAD  HEAD:  Atraumatic, normocephalic  EYES: EMOI, sclera clear  CHEST/LUNG: +increased work of breathing, breath sounds difficult to appreciate due to habitus but some wheezes upper lung fields b/l  HEART: RRR; S1, S2; no M/R/G  ABDOMEN: soft, non-distended; non-tender; BS present  EXTREMITIES:  1+ pitting edema R hand and no edema in LUE, peripheral pulses intact; thin lower extremities w/ NO edema  NEURO: non-focal  PSYCH: not assessed  SKIN: proximal extremities warm, no rashes or lesions      ------------------------------------------------------------------------------------------------------------  LABS:                        10.8   7.39  )-----------( 145      ( 30 Oct 2020 06:00 )             35.0     10-30    130<L>  |  85<L>  |  29<H>  ----------------------------<  91  4.5   |  34<H>  |  1.47<H>    Ca    9.0      30 Oct 2020 06:00  Phos  4.8     10-30  Mg     2.1     10-30    TPro  6.5  /  Alb  3.5  /  TBili  0.7  /  DBili  x   /  AST  31  /  ALT  18  /  AlkPhos  68  10-30                RADIOLOGY & ADDITIONAL TESTS:  Results Reviewed:     Imaging Personally Reviewed:  Electrocardiogram Personally Reviewed:      COORDINATION OF CARE:  Care discussed with consultants/other providers and notes reviewed [Y]/[N]:   Prior or Outpatient Records Reviewed [Y]/[N]:   ------------------------------------------------------------------------------------------------------------ INCOMPLETE NOTE    *******************************************************  Adriel Rivera MD PGY-1  Internal Medicine  Pager ([facility]) [phone] / (WSH) [phone]  *******************************************************  Patient is a 89y old  Female who presents with a chief complaint of hypoxia (30 Oct 2020 11:56)      SUBJECTIVE / OVERNIGHT EVENTS:  - No acute events overnight.   - Patient seen and evaluated at bedside.  - Endorses feeling well and breathing ok. Excited to go home.  - Denies fevers/chills, headache, SOB at rest, cough, chest pain, palpitations, abdominal pain, nausea/vomiting/diarrhea/constipation, melena/hematochezia, dysuria, and hematuria    ------------------------------------------------------------------------------------------------------------    MEDICATIONS  (STANDING):  albuterol/ipratropium for Nebulization 3 milliLiter(s) Nebulizer once  aspirin enteric coated 81 milliGRAM(s) Oral daily  enoxaparin Injectable 40 milliGRAM(s) SubCutaneous daily  hydrALAZINE 25 milliGRAM(s) Oral every 8 hours  isosorbide   dinitrate Tablet (ISORDIL) 10 milliGRAM(s) Oral three times a day  metoprolol tartrate 25 milliGRAM(s) Oral two times a day  polyethylene glycol 3350 17 Gram(s) Oral two times a day    MEDICATIONS  (PRN):  glucagon  Injectable 1 milliGRAM(s) IntraMuscular once PRN Glucose <70 milliGRAM(s)/deciLiter      ------------------------------------------------------------------------------------------------------------    OBJECTIVE:    I&O's Summary    30 Oct 2020 07:01  -  31 Oct 2020 07:00  --------------------------------------------------------  IN: 625 mL / OUT: 790 mL / NET: -165 mL         PHYSICAL EXAM:  T(F): 98, Max: 98 (10-30-20 @ 13:26)  HR: 51 (49 - 56)  BP: 134/64 (126/58 - 134/64)  RR: 19 (18 - 20)  SpO2: 99% (94% - 100%)        PHYSICAL EXAM:  GENERAL: obese elderly female, moderately ill appearing, pleasant, on 2L NC, in NAD  HEAD:  Atraumatic, normocephalic  EYES: EMOI, sclera clear  CHEST/LUNG: +increased work of breathing, breath sounds difficult to appreciate due to habitus but some wheezes upper lung fields b/l  HEART: RRR; S1, S2; no M/R/G  ABDOMEN: soft, non-distended; non-tender; BS present  EXTREMITIES:  1+ pitting edema R hand and no edema in LUE, peripheral pulses intact; thin lower extremities w/ NO edema  NEURO: non-focal  PSYCH: not assessed  SKIN: proximal extremities warm, no rashes or lesions      ------------------------------------------------------------------------------------------------------------  LABS:                        10.8   7.39  )-----------( 145      ( 30 Oct 2020 06:00 )             35.0     10-30    130<L>  |  85<L>  |  29<H>  ----------------------------<  91  4.5   |  34<H>  |  1.47<H>    Ca    9.0      30 Oct 2020 06:00  Phos  4.8     10-30  Mg     2.1     10-30    TPro  6.5  /  Alb  3.5  /  TBili  0.7  /  DBili  x   /  AST  31  /  ALT  18  /  AlkPhos  68  10-30                RADIOLOGY & ADDITIONAL TESTS:  Results Reviewed:     Imaging Personally Reviewed:  Electrocardiogram Personally Reviewed:      COORDINATION OF CARE:  Care discussed with consultants/other providers and notes reviewed [Y]/[N]:   Prior or Outpatient Records Reviewed [Y]/[N]:   ------------------------------------------------------------------------------------------------------------   *******************************************************  Adriel Rivera MD PGY-1  Internal Medicine  Pager ([facility]) [phone] / (BQB) [phone]  *******************************************************  Patient is a 89y old  Female who presents with a chief complaint of hypoxia (30 Oct 2020 11:56)      SUBJECTIVE / OVERNIGHT EVENTS:  - No acute events overnight.   - Patient seen and evaluated at bedside.  - Endorses feeling well and breathing ok. Excited to go home.  - Denies fevers/chills, headache, SOB at rest, cough, chest pain, palpitations, abdominal pain, nausea/vomiting/diarrhea/constipation, melena/hematochezia, dysuria, and hematuria    ------------------------------------------------------------------------------------------------------------    MEDICATIONS  (STANDING):  albuterol/ipratropium for Nebulization 3 milliLiter(s) Nebulizer once  aspirin enteric coated 81 milliGRAM(s) Oral daily  enoxaparin Injectable 40 milliGRAM(s) SubCutaneous daily  hydrALAZINE 25 milliGRAM(s) Oral every 8 hours  isosorbide   dinitrate Tablet (ISORDIL) 10 milliGRAM(s) Oral three times a day  metoprolol tartrate 25 milliGRAM(s) Oral two times a day  polyethylene glycol 3350 17 Gram(s) Oral two times a day    MEDICATIONS  (PRN):  glucagon  Injectable 1 milliGRAM(s) IntraMuscular once PRN Glucose <70 milliGRAM(s)/deciLiter      ------------------------------------------------------------------------------------------------------------    OBJECTIVE:    I&O's Summary    30 Oct 2020 07:01  -  31 Oct 2020 07:00  --------------------------------------------------------  IN: 625 mL / OUT: 790 mL / NET: -165 mL         PHYSICAL EXAM:  T(F): 98, Max: 98 (10-30-20 @ 13:26)  HR: 51 (49 - 56)  BP: 134/64 (126/58 - 134/64)  RR: 19 (18 - 20)  SpO2: 99% (94% - 100%)        PHYSICAL EXAM:  GENERAL: obese elderly female, moderately ill appearing, pleasant, on 2L NC, in NAD  HEAD:  Atraumatic, normocephalic  EYES: EMOI, sclera clear  CHEST/LUNG: +increased work of breathing, breath sounds difficult to appreciate due to habitus but some wheezes upper lung fields b/l  HEART: RRR; S1, S2; no M/R/G  ABDOMEN: soft, non-distended; non-tender; BS present  EXTREMITIES:  1+ pitting edema R hand and no edema in LUE, peripheral pulses intact; thin lower extremities w/ NO edema  NEURO: non-focal  PSYCH: not assessed  SKIN: proximal extremities warm, no rashes or lesions      ------------------------------------------------------------------------------------------------------------  LABS:                        10.8   7.39  )-----------( 145      ( 30 Oct 2020 06:00 )             35.0     10-30    130<L>  |  85<L>  |  29<H>  ----------------------------<  91  4.5   |  34<H>  |  1.47<H>    Ca    9.0      30 Oct 2020 06:00  Phos  4.8     10-30  Mg     2.1     10-30    TPro  6.5  /  Alb  3.5  /  TBili  0.7  /  DBili  x   /  AST  31  /  ALT  18  /  AlkPhos  68  10-30                RADIOLOGY & ADDITIONAL TESTS:  Results Reviewed:     Imaging Personally Reviewed:  Electrocardiogram Personally Reviewed:      COORDINATION OF CARE:  Care discussed with consultants/other providers and notes reviewed [Y]/[N]:   Prior or Outpatient Records Reviewed [Y]/[N]:   ------------------------------------------------------------------------------------------------------------

## 2020-10-31 NOTE — PROGRESS NOTE ADULT - ATTENDING COMMENTS
Patient seen and examined, case d/w house staff.  89F h/o depression, rhabdo, CVA w/ dysarthria, wheelchair bound, here with acute hypoxic/hypercarbic respiratory failure d/t to pneumonia and ADHF, diuresed and completed 7 day course of zosyn, plan for home hospice today.  PE notable for decreased air entry b/l, wheezes    # acute hypoxic/hypercarbic resp failure d/t  ADHF and superimposed aspiration PNA +/- obesity hypoventilation:  -completed 7 day course of zosyn , aspiration precaution  # acute diastolic CHF: resume lasix 20 mg qd on D/C (MOISES w/ creat 1.4 noted)  Echo reviewed, f/u cardiology  -patient doing well during the day and night off the AVAPs; d/c home with home hospice today on 2 L NC   -repeat VBG with lactate reviewed -- elevated lactate resolved  # HTN: c/w hydralazine 25mg PO TID, isordil 10mg tid and metoprolol 25mg BID  # H/o CVA:  -improved mental status  -speech and swallow reviewed -- diet adjusted  # Pseudomonas UTI: completed zosyn  # Bacteremia 2/2 Staph. Epi:  -BCx from 10/26 no growth to day  # Hyponatremia: resolved   # Metabolic encephalopathy:  -Metabolic encephalopathy like 2/2 hypercarbia, HF, hypoNa, PNA, hypoxia.   -resolved since being on AVAPS  # Urinary incontinence: passed TOV  -c/w primafit  # Afib:  -patient no longer in AFib  -currently on metoprolol 25mg BID; per cards, ok to continue with current dose   -per cards, patient is high risk for CVA given comorbidities however given hx of CVA, depression and suicide attempt would defer a/c at this time    Time spent on discharge 35 minutes coordinating discharge plan and discussing with patient and family.

## 2020-10-31 NOTE — PROGRESS NOTE ADULT - PROBLEM SELECTOR PLAN 3
pt in acute decompensated heart failure, diastolic LV dysfunction and Mitral stenosis, now appears euvolemic on exam. Unclear etiology. ACS workup negative    - Strict IOs, Daily weights  - holding lasix 40mg PO 2/2 c/f overdiuresis  - Cards consulted, recs reviewed.  - TTE with diastolic dysfunction, Mitral stenosis, LA englargement, severe pulm HTN  - c/w hydral/isordil. will hold off on starting ACEi given MOISES pt in acute decompensated heart failure, diastolic LV dysfunction and Mitral stenosis, now appears euvolemic on exam. Unclear etiology. ACS workup negative    - Strict IOs, Daily weights  - holding lasix 40mg PO 2/2 c/f overdiuresis  -will restart lasix 20 PO on d/c  - Cards consulted, recs reviewed.  - TTE with diastolic dysfunction, Mitral stenosis, LA englargement, severe pulm HTN  - c/w hydral/isordil. will hold off on starting ACEi given MOISES

## 2020-10-31 NOTE — PROGRESS NOTE ADULT - PROBLEM SELECTOR PLAN 8
- ucx with pseudomonas sensitive to zosyn. finished course  - passed TOV, on primafit  - Bowel regimen

## 2020-11-03 ENCOUNTER — TRANSCRIPTION ENCOUNTER (OUTPATIENT)
Age: 85
End: 2020-11-03

## 2022-10-19 NOTE — PATIENT PROFILE ADULT - NSPROHMSYMPCOND_GEN_A_NUR
Pharmacist chart review completed for refill of Dupixent indicates no medication or significant health changes since last pharmacist counseling. No questions for the pharmacist. Communicated with patient over phone. Patient's prescription was billed through Medicare Part D. Medication shipped on 10/20 via [employer] to [address] [place] [phone] for delivery in 1-2 business days.     Alee Fong   [facility]  Phone: [phone]  [email or web address]           
none

## 2022-11-23 NOTE — ED PROVIDER NOTE - PROGRESS NOTE ADDITIONAL3
[FreeTextEntry1] : 66yo WF s/p Csection 1987, repair of rectal prolapse w/resection in 2016. In July 2022 pt underwent explor laparotomy, SBR, LINDSAY.\par \par Sept 2022 admitted for SBO (treated with NGT decompression). End of Sept pt w/recurrent SBO. Underwent explor laparoscopy, SBR, LINDSAY.\par \par One week ago while visiting son in [place] hospitalized x4-5days with partial SBO. Required NGT decompression. Presently on regular diet. Daily BM. Slight bloating, denies N/V\par \par Last colonoscopy @2019 (reportedly nl study). Presents for recommendation. Additional Progress Note...

## 2023-08-11 NOTE — ED ADULT TRIAGE NOTE - ARRIVAL FROM
Writer cleaned patient's wound to the R forearm using sterile saline wound cleanser. Per instruction of Kellie Vance, NP writer applied bacitracin and nonadhesive telfa to the wound, then secured it with coban. Patient denied any discomfort and tolerated the procedure well.    Home

## 2023-09-25 NOTE — ED ADULT NURSE NOTE - NSFALLRSKASSESSTYPE_ED_ALL_ED
Raffy Mathews,  Thank you for taking the time to see us today.  We will monitor the IV antibiotic and labs  Follow up next available  IV Cefazolin every 8 hours until 10/18/2023 for Staph aureus infection (chest- hematoma) and then oral antibiotics, Cephalexin as step down therapy  PICC care as currently in place  Adding labs today:  CRP and ESR    Infectious Disease Doctor at [facility]: Simba Seay, DO    Follow up with ID here - next available    Elisa Sneed MD  [facility]  Answering Service: [phone]  On-Call ID provider: [phone], option: 9    
Initial (On Arrival)

## 2024-03-01 NOTE — PROVIDER CONTACT NOTE (OTHER) - BACKGROUND
Stable   Sees rheumatology   Patient was admitted with heart failure and respiratory failure, Patient is NPO except medication,  HX:  Urinary retention, HTN, CHF.
